# Patient Record
Sex: MALE | Race: WHITE | NOT HISPANIC OR LATINO | Employment: UNEMPLOYED | URBAN - NONMETROPOLITAN AREA
[De-identification: names, ages, dates, MRNs, and addresses within clinical notes are randomized per-mention and may not be internally consistent; named-entity substitution may affect disease eponyms.]

---

## 2018-10-07 ENCOUNTER — APPOINTMENT (OUTPATIENT)
Dept: CT IMAGING | Facility: HOSPITAL | Age: 38
End: 2018-10-07

## 2018-10-07 ENCOUNTER — HOSPITAL ENCOUNTER (INPATIENT)
Facility: HOSPITAL | Age: 38
LOS: 5 days | Discharge: HOME | End: 2018-10-12
Attending: EMERGENCY MEDICINE | Admitting: INTERNAL MEDICINE

## 2018-10-07 DIAGNOSIS — E83.42 HYPOMAGNESEMIA: ICD-10-CM

## 2018-10-07 DIAGNOSIS — F10.10 ALCOHOL ABUSE: ICD-10-CM

## 2018-10-07 DIAGNOSIS — D64.9 ANEMIA, UNSPECIFIED TYPE: Primary | ICD-10-CM

## 2018-10-07 DIAGNOSIS — K92.2 UPPER GI BLEEDING: ICD-10-CM

## 2018-10-07 DIAGNOSIS — E87.6 HYPOKALEMIA: ICD-10-CM

## 2018-10-07 LAB
6-ACETYL MORPHINE: NEGATIVE
ABO GROUP BLD: NORMAL
ABO GROUP BLD: NORMAL
ALBUMIN SERPL-MCNC: 3.4 G/DL (ref 3.5–5)
ALBUMIN/GLOB SERPL: 1 G/DL (ref 1.5–2.5)
ALP SERPL-CCNC: 152 U/L (ref 40–129)
ALT SERPL W P-5'-P-CCNC: 32 U/L (ref 10–44)
AMPHET+METHAMPHET UR QL: NEGATIVE
ANION GAP SERPL CALCULATED.3IONS-SCNC: 12.5 MMOL/L (ref 3.6–11.2)
APAP SERPL-MCNC: <10 MCG/ML (ref 0–200)
APTT PPP: 29.5 SECONDS (ref 23.8–36.1)
AST SERPL-CCNC: 111 U/L (ref 10–34)
BACTERIA UR QL AUTO: NORMAL /HPF
BARBITURATES UR QL SCN: NEGATIVE
BASOPHILS # BLD AUTO: 0.02 10*3/MM3 (ref 0–0.3)
BASOPHILS NFR BLD AUTO: 0.4 % (ref 0–2)
BENZODIAZ UR QL SCN: POSITIVE
BILIRUB SERPL-MCNC: 0.8 MG/DL (ref 0.2–1.8)
BILIRUB UR QL STRIP: ABNORMAL
BLD GP AB SCN SERPL QL: NEGATIVE
BUN BLD-MCNC: 9 MG/DL (ref 7–21)
BUN/CREAT SERPL: 15 (ref 7–25)
BUPRENORPHINE SERPL-MCNC: NEGATIVE NG/ML
CALCIUM SPEC-SCNC: 7.2 MG/DL (ref 7.7–10)
CANNABINOIDS SERPL QL: POSITIVE
CHLORIDE SERPL-SCNC: 97 MMOL/L (ref 99–112)
CLARITY UR: CLEAR
CO2 SERPL-SCNC: 23.5 MMOL/L (ref 24.3–31.9)
COCAINE UR QL: NEGATIVE
COLOR UR: ABNORMAL
CREAT BLD-MCNC: 0.6 MG/DL (ref 0.43–1.29)
DEPRECATED RDW RBC AUTO: 47.1 FL (ref 37–54)
DEVELOPER EXPIRATION DATE: ABNORMAL
DEVELOPER LOT NUMBER: ABNORMAL
EOSINOPHIL # BLD AUTO: 0.02 10*3/MM3 (ref 0–0.7)
EOSINOPHIL NFR BLD AUTO: 0.4 % (ref 0–5)
ERYTHROCYTE [DISTWIDTH] IN BLOOD BY AUTOMATED COUNT: 15.5 % (ref 11.5–14.5)
ETHANOL BLD-MCNC: 195 MG/DL
ETHANOL UR QL: 0.2 %
EXPIRATION DATE: ABNORMAL
FECAL OCCULT BLOOD SCREEN, POC: POSITIVE
GFR SERPL CREATININE-BSD FRML MDRD: 150 ML/MIN/1.73
GLOBULIN UR ELPH-MCNC: 3.4 GM/DL
GLUCOSE BLD-MCNC: 134 MG/DL (ref 70–110)
GLUCOSE UR STRIP-MCNC: NEGATIVE MG/DL
HAV IGM SERPL QL IA: NORMAL
HBV CORE IGM SERPL QL IA: NORMAL
HBV SURFACE AG SERPL QL IA: NORMAL
HCT VFR BLD AUTO: 15.3 % (ref 42–52)
HCV AB SER DONR QL: NORMAL
HGB BLD-MCNC: 4.6 G/DL (ref 14–18)
HGB UR QL STRIP.AUTO: NEGATIVE
HYALINE CASTS UR QL AUTO: NORMAL /LPF
IMM GRANULOCYTES # BLD: 0.01 10*3/MM3 (ref 0–0.03)
IMM GRANULOCYTES NFR BLD: 0.2 % (ref 0–0.5)
INR PPP: 1.3 (ref 0.9–1.1)
KETONES UR QL STRIP: ABNORMAL
LEUKOCYTE ESTERASE UR QL STRIP.AUTO: ABNORMAL
LYMPHOCYTES # BLD AUTO: 1.33 10*3/MM3 (ref 1–3)
LYMPHOCYTES NFR BLD AUTO: 23.5 % (ref 21–51)
Lab: ABNORMAL
MAGNESIUM SERPL-MCNC: 1.2 MG/DL (ref 1.7–2.6)
MCH RBC QN AUTO: 26.3 PG (ref 27–33)
MCHC RBC AUTO-ENTMCNC: 30.1 G/DL (ref 33–37)
MCV RBC AUTO: 87.4 FL (ref 80–94)
METHADONE UR QL SCN: NEGATIVE
MONOCYTES # BLD AUTO: 0.51 10*3/MM3 (ref 0.1–0.9)
MONOCYTES NFR BLD AUTO: 9 % (ref 0–10)
NEGATIVE CONTROL: NEGATIVE
NEUTROPHILS # BLD AUTO: 3.76 10*3/MM3 (ref 1.4–6.5)
NEUTROPHILS NFR BLD AUTO: 66.5 % (ref 30–70)
NITRITE UR QL STRIP: NEGATIVE
OPIATES UR QL: NEGATIVE
OSMOLALITY SERPL CALC.SUM OF ELEC: 267 MOSM/KG (ref 273–305)
OXYCODONE UR QL SCN: NEGATIVE
PCP UR QL SCN: NEGATIVE
PH UR STRIP.AUTO: 6 [PH] (ref 5–8)
PLATELET # BLD AUTO: 210 10*3/MM3 (ref 130–400)
PMV BLD AUTO: 11 FL (ref 6–10)
POSITIVE CONTROL: POSITIVE
POTASSIUM BLD-SCNC: 2.8 MMOL/L (ref 3.5–5.3)
PROT SERPL-MCNC: 6.8 G/DL (ref 6–8)
PROT UR QL STRIP: ABNORMAL
PROTHROMBIN TIME: 16.4 SECONDS (ref 11–15.4)
RBC # BLD AUTO: 1.75 10*6/MM3 (ref 4.7–6.1)
RBC # UR: NORMAL /HPF
REF LAB TEST METHOD: NORMAL
RH BLD: POSITIVE
RH BLD: POSITIVE
SODIUM BLD-SCNC: 133 MMOL/L (ref 135–153)
SODIUM BLD-SCNC: 136 MMOL/L (ref 135–153)
SP GR UR STRIP: 1.02 (ref 1–1.03)
SQUAMOUS #/AREA URNS HPF: NORMAL /HPF
T&S EXPIRATION DATE: NORMAL
UROBILINOGEN UR QL STRIP: ABNORMAL
WBC NRBC COR # BLD: 5.65 10*3/MM3 (ref 4.5–12.5)
WBC UR QL AUTO: NORMAL /HPF

## 2018-10-07 PROCEDURE — 86900 BLOOD TYPING SEROLOGIC ABO: CPT | Performed by: PHYSICIAN ASSISTANT

## 2018-10-07 PROCEDURE — 80307 DRUG TEST PRSMV CHEM ANLYZR: CPT | Performed by: PHYSICIAN ASSISTANT

## 2018-10-07 PROCEDURE — 85730 THROMBOPLASTIN TIME PARTIAL: CPT | Performed by: PHYSICIAN ASSISTANT

## 2018-10-07 PROCEDURE — 86901 BLOOD TYPING SEROLOGIC RH(D): CPT

## 2018-10-07 PROCEDURE — 86920 COMPATIBILITY TEST SPIN: CPT

## 2018-10-07 PROCEDURE — 25010000002 LORAZEPAM PER 2 MG: Performed by: NURSE PRACTITIONER

## 2018-10-07 PROCEDURE — 25010000002 MORPHINE PER 10 MG: Performed by: EMERGENCY MEDICINE

## 2018-10-07 PROCEDURE — 82270 OCCULT BLOOD FECES: CPT | Performed by: EMERGENCY MEDICINE

## 2018-10-07 PROCEDURE — 25010000002 VITAMIN K1 PER 1 MG: Performed by: INTERNAL MEDICINE

## 2018-10-07 PROCEDURE — 86850 RBC ANTIBODY SCREEN: CPT | Performed by: PHYSICIAN ASSISTANT

## 2018-10-07 PROCEDURE — 25010000002 THIAMINE PER 100 MG: Performed by: PHYSICIAN ASSISTANT

## 2018-10-07 PROCEDURE — 81001 URINALYSIS AUTO W/SCOPE: CPT | Performed by: PHYSICIAN ASSISTANT

## 2018-10-07 PROCEDURE — 99223 1ST HOSP IP/OBS HIGH 75: CPT | Performed by: INTERNAL MEDICINE

## 2018-10-07 PROCEDURE — 80307 DRUG TEST PRSMV CHEM ANLYZR: CPT | Performed by: EMERGENCY MEDICINE

## 2018-10-07 PROCEDURE — 93005 ELECTROCARDIOGRAM TRACING: CPT | Performed by: NURSE PRACTITIONER

## 2018-10-07 PROCEDURE — 93010 ELECTROCARDIOGRAM REPORT: CPT | Performed by: INTERNAL MEDICINE

## 2018-10-07 PROCEDURE — 25010000002 ONDANSETRON PER 1 MG: Performed by: EMERGENCY MEDICINE

## 2018-10-07 PROCEDURE — 36430 TRANSFUSION BLD/BLD COMPNT: CPT

## 2018-10-07 PROCEDURE — 74176 CT ABD & PELVIS W/O CONTRAST: CPT

## 2018-10-07 PROCEDURE — 83735 ASSAY OF MAGNESIUM: CPT | Performed by: EMERGENCY MEDICINE

## 2018-10-07 PROCEDURE — 85610 PROTHROMBIN TIME: CPT | Performed by: PHYSICIAN ASSISTANT

## 2018-10-07 PROCEDURE — 25010000002 MAGNESIUM SULFATE PER 500 MG OF MAGNESIUM: Performed by: PHYSICIAN ASSISTANT

## 2018-10-07 PROCEDURE — 25010000002 MORPHINE PER 10 MG: Performed by: INTERNAL MEDICINE

## 2018-10-07 PROCEDURE — 99285 EMERGENCY DEPT VISIT HI MDM: CPT

## 2018-10-07 PROCEDURE — 84295 ASSAY OF SERUM SODIUM: CPT | Performed by: INTERNAL MEDICINE

## 2018-10-07 PROCEDURE — P9016 RBC LEUKOCYTES REDUCED: HCPCS

## 2018-10-07 PROCEDURE — 86901 BLOOD TYPING SEROLOGIC RH(D): CPT | Performed by: PHYSICIAN ASSISTANT

## 2018-10-07 PROCEDURE — 86900 BLOOD TYPING SEROLOGIC ABO: CPT

## 2018-10-07 PROCEDURE — 25010000003 POTASSIUM CHLORIDE 10 MEQ/100ML SOLUTION

## 2018-10-07 PROCEDURE — 80074 ACUTE HEPATITIS PANEL: CPT | Performed by: NURSE PRACTITIONER

## 2018-10-07 PROCEDURE — 74176 CT ABD & PELVIS W/O CONTRAST: CPT | Performed by: RADIOLOGY

## 2018-10-07 PROCEDURE — 80053 COMPREHEN METABOLIC PANEL: CPT | Performed by: EMERGENCY MEDICINE

## 2018-10-07 PROCEDURE — 85025 COMPLETE CBC W/AUTO DIFF WBC: CPT | Performed by: EMERGENCY MEDICINE

## 2018-10-07 PROCEDURE — 25010000002 MAGNESIUM SULFATE 2 GM/50ML SOLUTION: Performed by: PHYSICIAN ASSISTANT

## 2018-10-07 RX ORDER — LORAZEPAM 1 MG/1
1 TABLET ORAL
Status: DISCONTINUED | OUTPATIENT
Start: 2018-10-07 | End: 2018-10-12 | Stop reason: HOSPADM

## 2018-10-07 RX ORDER — POTASSIUM CHLORIDE 1.5 G/1.77G
40 POWDER, FOR SOLUTION ORAL AS NEEDED
Status: DISCONTINUED | OUTPATIENT
Start: 2018-10-07 | End: 2018-10-08 | Stop reason: SDUPTHER

## 2018-10-07 RX ORDER — MORPHINE SULFATE 2 MG/ML
1 INJECTION, SOLUTION INTRAMUSCULAR; INTRAVENOUS
Status: DISCONTINUED | OUTPATIENT
Start: 2018-10-07 | End: 2018-10-09

## 2018-10-07 RX ORDER — POTASSIUM CHLORIDE 750 MG/1
40 CAPSULE, EXTENDED RELEASE ORAL AS NEEDED
Status: DISCONTINUED | OUTPATIENT
Start: 2018-10-07 | End: 2018-10-08 | Stop reason: SDUPTHER

## 2018-10-07 RX ORDER — SODIUM CHLORIDE 0.9 % (FLUSH) 0.9 %
3-10 SYRINGE (ML) INJECTION AS NEEDED
Status: DISCONTINUED | OUTPATIENT
Start: 2018-10-07 | End: 2018-10-12 | Stop reason: HOSPADM

## 2018-10-07 RX ORDER — PHYTONADIONE 10 MG/ML
5 INJECTION, EMULSION INTRAMUSCULAR; INTRAVENOUS; SUBCUTANEOUS ONCE
Status: COMPLETED | OUTPATIENT
Start: 2018-10-07 | End: 2018-10-07

## 2018-10-07 RX ORDER — LORAZEPAM 2 MG/ML
2 INJECTION INTRAMUSCULAR
Status: DISCONTINUED | OUTPATIENT
Start: 2018-10-07 | End: 2018-10-12 | Stop reason: HOSPADM

## 2018-10-07 RX ORDER — MAGNESIUM SULFATE HEPTAHYDRATE 40 MG/ML
2 INJECTION, SOLUTION INTRAVENOUS AS NEEDED
Status: DISCONTINUED | OUTPATIENT
Start: 2018-10-07 | End: 2018-10-08 | Stop reason: SDUPTHER

## 2018-10-07 RX ORDER — SODIUM CHLORIDE 0.9 % (FLUSH) 0.9 %
3 SYRINGE (ML) INJECTION EVERY 12 HOURS SCHEDULED
Status: DISCONTINUED | OUTPATIENT
Start: 2018-10-07 | End: 2018-10-12 | Stop reason: HOSPADM

## 2018-10-07 RX ORDER — PANTOPRAZOLE SODIUM 40 MG/10ML
80 INJECTION, POWDER, LYOPHILIZED, FOR SOLUTION INTRAVENOUS ONCE
Status: COMPLETED | OUTPATIENT
Start: 2018-10-07 | End: 2018-10-07

## 2018-10-07 RX ORDER — LORAZEPAM 2 MG/ML
1 INJECTION INTRAMUSCULAR
Status: DISCONTINUED | OUTPATIENT
Start: 2018-10-07 | End: 2018-10-12 | Stop reason: HOSPADM

## 2018-10-07 RX ORDER — POTASSIUM CHLORIDE 7.45 MG/ML
10 INJECTION INTRAVENOUS
Status: DISCONTINUED | OUTPATIENT
Start: 2018-10-07 | End: 2018-10-08 | Stop reason: SDUPTHER

## 2018-10-07 RX ORDER — MORPHINE SULFATE 2 MG/ML
4 INJECTION, SOLUTION INTRAMUSCULAR; INTRAVENOUS ONCE
Status: COMPLETED | OUTPATIENT
Start: 2018-10-07 | End: 2018-10-07

## 2018-10-07 RX ORDER — MAGNESIUM SULFATE HEPTAHYDRATE 40 MG/ML
2 INJECTION, SOLUTION INTRAVENOUS
Status: COMPLETED | OUTPATIENT
Start: 2018-10-07 | End: 2018-10-07

## 2018-10-07 RX ORDER — ONDANSETRON 2 MG/ML
4 INJECTION INTRAMUSCULAR; INTRAVENOUS ONCE
Status: COMPLETED | OUTPATIENT
Start: 2018-10-07 | End: 2018-10-07

## 2018-10-07 RX ORDER — POTASSIUM CHLORIDE 7.45 MG/ML
10 INJECTION INTRAVENOUS
Status: COMPLETED | OUTPATIENT
Start: 2018-10-07 | End: 2018-10-07

## 2018-10-07 RX ORDER — LORAZEPAM 2 MG/1
2 TABLET ORAL
Status: DISCONTINUED | OUTPATIENT
Start: 2018-10-07 | End: 2018-10-12 | Stop reason: HOSPADM

## 2018-10-07 RX ORDER — SODIUM CHLORIDE 9 MG/ML
INJECTION, SOLUTION INTRAVENOUS
Status: DISPENSED
Start: 2018-10-07 | End: 2018-10-08

## 2018-10-07 RX ORDER — MAGNESIUM SULFATE HEPTAHYDRATE 40 MG/ML
4 INJECTION, SOLUTION INTRAVENOUS AS NEEDED
Status: DISCONTINUED | OUTPATIENT
Start: 2018-10-07 | End: 2018-10-08 | Stop reason: SDUPTHER

## 2018-10-07 RX ADMIN — MAGNESIUM SULFATE IN WATER 2 G: 40 INJECTION, SOLUTION INTRAVENOUS at 15:32

## 2018-10-07 RX ADMIN — PANTOPRAZOLE SODIUM 80 MG: 40 INJECTION, POWDER, FOR SOLUTION INTRAVENOUS at 15:18

## 2018-10-07 RX ADMIN — POTASSIUM CHLORIDE 10 MEQ: 10 INJECTION, SOLUTION INTRAVENOUS at 18:58

## 2018-10-07 RX ADMIN — Medication 3 ML: at 20:02

## 2018-10-07 RX ADMIN — POTASSIUM CHLORIDE 10 MEQ: 10 INJECTION, SOLUTION INTRAVENOUS at 21:21

## 2018-10-07 RX ADMIN — LORAZEPAM 1 MG: 2 INJECTION INTRAMUSCULAR; INTRAVENOUS at 17:32

## 2018-10-07 RX ADMIN — POTASSIUM CHLORIDE 10 MEQ: 10 INJECTION, SOLUTION INTRAVENOUS at 20:01

## 2018-10-07 RX ADMIN — MAGNESIUM SULFATE IN WATER 2 G: 40 INJECTION, SOLUTION INTRAVENOUS at 17:19

## 2018-10-07 RX ADMIN — PANTOPRAZOLE SODIUM 8 MG/HR: 40 INJECTION, POWDER, FOR SOLUTION INTRAVENOUS at 15:20

## 2018-10-07 RX ADMIN — ONDANSETRON 4 MG: 2 INJECTION, SOLUTION INTRAMUSCULAR; INTRAVENOUS at 15:49

## 2018-10-07 RX ADMIN — MORPHINE SULFATE 1 MG: 2 INJECTION, SOLUTION INTRAMUSCULAR; INTRAVENOUS at 20:36

## 2018-10-07 RX ADMIN — PANTOPRAZOLE SODIUM 8 MG/HR: 40 INJECTION, POWDER, FOR SOLUTION INTRAVENOUS at 22:48

## 2018-10-07 RX ADMIN — MORPHINE SULFATE 1 MG: 2 INJECTION, SOLUTION INTRAMUSCULAR; INTRAVENOUS at 17:32

## 2018-10-07 RX ADMIN — POTASSIUM CHLORIDE 10 MEQ: 10 INJECTION, SOLUTION INTRAVENOUS at 15:16

## 2018-10-07 RX ADMIN — THIAMINE HYDROCHLORIDE 1000 ML/HR: 100 INJECTION, SOLUTION INTRAMUSCULAR; INTRAVENOUS at 15:10

## 2018-10-07 RX ADMIN — PHYTONADIONE 5 MG: 10 INJECTION, EMULSION INTRAMUSCULAR; INTRAVENOUS; SUBCUTANEOUS at 18:27

## 2018-10-07 RX ADMIN — POTASSIUM CHLORIDE 10 MEQ: 10 INJECTION, SOLUTION INTRAVENOUS at 17:18

## 2018-10-07 RX ADMIN — MORPHINE SULFATE 1 MG: 2 INJECTION, SOLUTION INTRAMUSCULAR; INTRAVENOUS at 23:44

## 2018-10-07 RX ADMIN — POLYETHYLENE GLYCOL 3350 255 G: 1 POWDER ORAL at 19:34

## 2018-10-07 RX ADMIN — MORPHINE SULFATE 4 MG: 2 INJECTION, SOLUTION INTRAMUSCULAR; INTRAVENOUS at 15:50

## 2018-10-07 RX ADMIN — POTASSIUM CHLORIDE 10 MEQ: 10 INJECTION, SOLUTION INTRAVENOUS at 18:26

## 2018-10-07 NOTE — ED PROVIDER NOTES
Subjective   38-year-old white male presents secondary for need for alcohol detox.  He states that he has been drinking approximately one pack per day over the past 4-5 months.  He denies any abdominal pain.  He states that over the last week or so he has had some dark tarry stools.  He has had a few episodes of vomiting.  He states that a couple times he's noticed some dark specks.  No bright red blood.  He voices no other complaints at this time.            Review of Systems   Constitutional: Negative.  Negative for fever.   HENT: Negative.    Respiratory: Negative.    Cardiovascular: Negative.  Negative for chest pain.   Gastrointestinal: Negative.  Negative for abdominal pain.   Endocrine: Negative.    Genitourinary: Negative.  Negative for dysuria.   Skin: Negative.    Neurological: Negative.    Psychiatric/Behavioral: Negative.    All other systems reviewed and are negative.      Past Medical History:   Diagnosis Date   • Alcohol abuse    • Seizure due to alcohol withdrawal (CMS/HCC)    • Seizures (CMS/HCC)        No Known Allergies    History reviewed. No pertinent surgical history.    History reviewed. No pertinent family history.    Social History     Social History   • Marital status: Single     Social History Main Topics   • Smoking status: Never Smoker   • Smokeless tobacco: Current User     Types: Snuff   • Alcohol use Yes      Comment: 1 pint liquor daily   • Drug use: No     Other Topics Concern   • Not on file           Objective   Physical Exam   Constitutional: He is oriented to person, place, and time. He appears well-developed and well-nourished. No distress.   pale   HENT:   Head: Normocephalic and atraumatic.   Right Ear: External ear normal.   Left Ear: External ear normal.   Nose: Nose normal.   Eyes: Pupils are equal, round, and reactive to light. Conjunctivae and EOM are normal.   Neck: Normal range of motion. Neck supple. No JVD present. No tracheal deviation present.   Cardiovascular:  Regular rhythm and normal heart sounds.  Tachycardia present.    No murmur heard.  Pulmonary/Chest: Effort normal and breath sounds normal. No respiratory distress. He has no wheezes.   Abdominal: Soft. Bowel sounds are normal. There is no tenderness.   Genitourinary: Rectal exam shows guaiac positive stool.   Genitourinary Comments: Stool was brown.  No bright red blood.  No melena.  This was heme positive.   Musculoskeletal: Normal range of motion. He exhibits no edema or deformity.   Neurological: He is alert and oriented to person, place, and time. No cranial nerve deficit.   Skin: Skin is warm and dry. No rash noted. He is not diaphoretic. No erythema. No pallor.   Psychiatric: He has a normal mood and affect. His behavior is normal. Thought content normal.   Nursing note and vitals reviewed.      Procedures           ED Course  ED Course as of Oct 07 1557   Sun Oct 07, 2018   1522 D/w Dr Owen- would be happy to see patient in consult. Paged the hospitalist  [JI]      ED Course User Index  [JI] Earnest Balderas PA                  MDM  Number of Diagnoses or Management Options  Alcohol abuse: new and requires workup  Anemia, unspecified type: new and requires workup  Hypokalemia: new and requires workup  Hypomagnesemia: new and requires workup  Upper GI bleeding: new and requires workup     Amount and/or Complexity of Data Reviewed  Clinical lab tests: reviewed and ordered  Tests in the radiology section of CPT®: ordered and reviewed  Discuss the patient with other providers: yes    Risk of Complications, Morbidity, and/or Mortality  Presenting problems: high  Management options: high    Critical Care  Total time providing critical care: 30-74 minutes (40)        Final diagnoses:   Anemia, unspecified type   Upper GI bleeding   Hypokalemia   Hypomagnesemia   Alcohol abuse            Earnest Balderas PA  10/07/18 2161

## 2018-10-07 NOTE — H&P
"  [facility] Medicine Services  History & Physical          Patient Identification:  Name:  Tavo Murrieta  Age:  38 y.o.  Sex:  male  :  1980  MRN:  [id number]   Visit Number:  [id number]  Primary Care Physician:  Duc Pena MD    I have seen the patient in conjunction with JONATHAN Tello and I agree with the following statements:     Subjective     Chief complaint: ETOH detox    Patient seen with sister as well as Lovely SMALLWOOD, I had permission from the patient to discuss his case with him in front of his sister.    History of presenting illness:    Mr. Murrieta is a 38 year old male who presented to [facility] ER on 10/7/18 for ETOH detox. During his medical clearance he was found to have a hemoglobin of 4.6 HCT of 15.3, with a positive hemoccult. He also admitted to dark tarry stools having around 2 episodes a day for around a week he states. He also admits to vomiting, which he states does look like coffee ground emesis, he states this has been on/off for a \"few weeks\". He states he started drinking heavily again around the last \"month or two\" after loosing his job, he became depressed and started drinking more heavily. He has had a seizure about 3 years ago with withdrawal from ETOH. He denies fever, no abdominal pain. We will admit him to CCU for further monitoring and evaluation.  He tells me he drinks about a pint a day.  He has had withdrawal seizures in the past.  His main complaint is leg and arm pain, the leg pain is just like an ache right greater than left the arm pain is more of a sharp pain and this is the arm that has had potassium going through it.  He is uncertain exactly when the pain started.  He has had no associated fever.  This is a constant pain but intensity varies.  Patient states he does not use NSAIDs.  He thinks he probably started drinking in his 20s.  He denies any benzodiazepine use but did acknowledge a THC use.  She states he has been " "getting lightheaded when he gets up after he gets a \"head rush\".  This has been going on for a few days and it will then turned into him feeling like he is going to pass out.    His Ethanol level in the ER is  195, his UDS was positive for benzo's and THC, tylenol level was normal. He also has a potassium of 2.8, magnesium level of 1.2, and sodium level of 133, creatinine is normal at 0.60. WBC is 5.65.  He is not  but lives with a fiancé his next of kin decision maker is his mother, Katrin Murrieta [phone].       ---------------------------------------------------------------------------------------------------------------------   Review of Systems   Constitutional: Positive for fatigue. Negative for chills, diaphoresis and fever.   HENT: Negative for congestion and rhinorrhea.    Eyes: Negative for photophobia and visual disturbance.   Respiratory: Negative for cough, shortness of breath and wheezing.    Cardiovascular: Negative for chest pain and leg swelling.   Gastrointestinal: Positive for blood in stool, diarrhea (dark tarry stools), nausea and vomiting (coffee ground emesis ). Negative for abdominal distention, abdominal pain and constipation.   Endocrine: Negative for cold intolerance and heat intolerance.   Genitourinary: Negative for difficulty urinating.   Musculoskeletal: Negative for arthralgias and myalgias.   Skin: Negative for color change and pallor.   Allergic/Immunologic: Negative for environmental allergies.   Neurological: Negative for dizziness, weakness and light-headedness.   Hematological: Negative for adenopathy.   Psychiatric/Behavioral: Negative for agitation and confusion.      ---------------------------------------------------------------------------------------------------------------------   Past Medical History:   Diagnosis Date   • Alcohol abuse    • Seizure due to alcohol withdrawal (CMS/HCC)    • Seizures (CMS/HCC)      History reviewed. No pertinent surgical " history.  History reviewed. No pertinent family history.  Social History     Social History   • Marital status: Single     Social History Main Topics   • Smoking status: Never Smoker   • Smokeless tobacco: Current User     Types: Snuff   • Alcohol use Yes      Comment: 1 pint liquor daily   • Drug use: No     Other Topics Concern   • Not on file     ---------------------------------------------------------------------------------------------------------------------   Allergies:  Patient has no known allergies.  ---------------------------------------------------------------------------------------------------------------------     Medications below are reported home medications pulling from within the system; at this time, these medications have not been reconciled unless otherwise specified and are in the verification process for further verifcation as current home medications.    Prior to Admission Medications     None        Hospital Scheduled Meds:    sodium chloride      magnesium sulfate 2 g Intravenous Q2H   Morphine 4 mg Intravenous Once   potassium chloride 10 mEq Intravenous Q1H       pantoprazole 8 mg/hr Last Rate: 8 mg/hr (10/07/18 1520)     ---------------------------------------------------------------------------------------------------------------------   Objective       Vital Signs:  Temp:  [98.1 °F (36.7 °C)] 98.1 °F (36.7 °C)  Heart Rate:  [117-135] 117  Resp:  [20] 20  BP: (112-119)/(68-70) 115/69  1    10/07/18  1334   Weight: 77.1 kg (170 lb)     Body mass index is 22.43 kg/m².  ---------------------------------------------------------------------------------------------------------------------       Physical Exam    Physical Exam:  Constitutional:  Well-developed and well-nourished.  Face is symmetric  HENT:  Head: Normocephalic and atraumatic.  Mouth:  Moist mucous membranes.    Eyes:  Conjunctivae and EOM are normal.  Pupils are equal, round, and reactive to light.  No scleral icterus.   Mucus membranes of the eyes are pale  Neck:  Neck supple.  No JVD present.    Cardiovascular:  Normal rate, regular rhythm.  with no murmur.  Trace edema of the left ankle none of the right  Pulmonary/Chest:  No respiratory distress, no wheezes, no crackles, with normal breath sounds and good air movement.  No increased respiratory effort he is not using accessory muscles and can speak in full sentences without difficulty  Abdominal:  Soft.  Bowel sounds are present.  No distension and no tenderness.  No organomegaly appreciated   Musculoskeletal:  No edema, (trace left ankle edema to my exam) no tenderness, and no deformity.  No red or swollen joints anywhere.  IV sites look good he has excellent palpable distal pulses right were slightly greater than left   Neurological:  Alert and oriented to person, place, and time.  No cranial nerve deficit.  No tongue deviation.  No facial droop.  No slurred speech.   Skin:  Skin is warm and dry.  No rash noted.  Pale.   Psychiatric:  Normal mood and affect.   Peripheral vascular:  No edema and strong pulses on all 4 extremities.  ---------------------------------------------------------------------------------------------------------------------  EKG:  EKG reviewed, QTC is acceptable, sinus tach without evidence of ischemia  Telemetry:    I have personally looked at the entry strips and he is in sinus rhythm  ---------------------------------------------------------------------------------------------------------------------     Results from last 7 days  Lab Units 10/07/18  1440   WBC 10*3/mm3 5.65   HEMOGLOBIN g/dL 4.6*   HEMATOCRIT % 15.3*   MCV fL 87.4   MCHC g/dL 30.1*   PLATELETS 10*3/mm3 210           Results from last 7 days  Lab Units 10/07/18  1354   SODIUM mmol/L 133*   POTASSIUM mmol/L 2.8*   MAGNESIUM mg/dL 1.2*   CHLORIDE mmol/L 97*   CO2 mmol/L 23.5*   BUN mg/dL 9   CREATININE mg/dL 0.60   EGFR IF NONAFRICN AM mL/min/1.73 150   CALCIUM mg/dL 7.2*   GLUCOSE mg/dL  134*   ALBUMIN g/dL 3.40*   BILIRUBIN mg/dL 0.8   ALK PHOS U/L 152*   AST (SGOT) U/L 111*   ALT (SGPT) U/L 32   Estimated Creatinine Clearance: 180.3 mL/min (by C-G formula based on SCr of 0.6 mg/dL).  No results found for: AMMONIA          No results found for: HGBA1C  No results found for: TSH, FREET4  No results found for: PREGTESTUR, PREGSERUM, HCG, HCGQUANT  Pain Management Panel     Pain Management Panel Latest Ref Rng & Units 10/7/2018    AMPHETAMINES SCREEN, URINE Negative Negative    BARBITURATES SCREEN Negative Negative    BENZODIAZEPINE SCREEN, URINE Negative Positive(A)    BUPRENORPHINE Negative Negative    COCAINE SCREEN, URINE Negative Negative    METHADONE SCREEN, URINE Negative Negative                   ---------------------------------------------------------------------------------------------------------------------  Imaging Results (last 7 days)     ** No results found for the last 168 hours. **          Cultures: none     I have personally reviewed the radiology images and read the final radiology report.  ---------------------------------------------------------------------------------------------------------------------  Assessment / Plan       Assessment and Plan:    ETOH withdrawal  Acute GI Bleed  Acute anemia r/t blood loss  Hypokalemia  Hypomagnesemia  Hyponatremia   Elevated Transaminase     ETOH withdrawal: CIWA protocol ordered, banana bag daily for 3 days, seizure precautions. Will monitor closely.  Possibly tomorrow once his alcohol has been metabolized consider instigation of scheduled Librium    Acute GI bleed: will trend H/H every 8 hours, the ED has ordered 2 units of PRBC for now. Repeat H/H after. protonix 40mg gtt ordered. General surgery consulted. Will monitor closely. STAT CT of the abdomen/pelvis ordered without contrast.  The CT is to look for any evidence of a nodular small cirrhotic liver.  INR is 1.3 but most likely he is vitamin deficient, I'm going to give a dose of  vitamin K.  I did discuss with Dr. Owen, I have placed the patient on clear liquids and nothing by mouth after midnight and he is going to do an EGD and colonoscopy tomorrow, he is going to call in a prep.  He is tachycardic and was getting orthostasis type symptoms but his blood pressure is acceptable at this time and I do not think he is actively bleeding.  I discussed with the ER provider he did a rectal exam that showed brown stool that was heme positive.  Will check anemia studies in the a.m. but these may be somewhat affected by having had a transfusion today.    Hypokalemia: level is 2.8, protocol replacement ordered.     Arm pain probably from potassium    Leg pain, neurovascularly intact, low-dose morphine added    Hypomagnesemia: 1.2, protocol replacement ordered.     Hyponatremia: level is 133, borderline low, will monitor closely with IV fluids will check sodium every 6 hours, with parameters to call    Elevated Transaminase: ALT is 111, acute hep panel ordered, likely related to his ETOH use. Acute hep panel ordered. INR is 1.30, vitamin K to be given, repeat in the AM>Will monitor trend.     DVT prophylaxis: no pharmacological prophylaxis due to anemia, SCDs ordered.     Patient is high risk for the following reasons: ETOH withdrawal, Acute GI bleed, Acute anemia r/t blood loss       Ratna Joyner, JONATHAN  10/07/18  3:53 PM  ---------------------------------------------------------------------------------------------------------------------

## 2018-10-07 NOTE — PLAN OF CARE
Problem: Alcohol Withdrawal Acute, Risk/Actual (Adult)  Goal: Signs and Symptoms of Listed Potential Problems Will be Absent, Minimized or Managed (Alcohol Withdrawal Acute, Risk/Actual)  Outcome: Ongoing (interventions implemented as appropriate)      Problem: Patient Care Overview  Goal: Plan of Care Review  Outcome: Ongoing (interventions implemented as appropriate)    Goal: Individualization and Mutuality  Outcome: Ongoing (interventions implemented as appropriate)    Goal: Discharge Needs Assessment  Outcome: Ongoing (interventions implemented as appropriate)      Problem: Anemia (Adult)  Goal: Identify Related Risk Factors and Signs and Symptoms  Outcome: Ongoing (interventions implemented as appropriate)    Goal: Symptom Improvement  Outcome: Ongoing (interventions implemented as appropriate)

## 2018-10-07 NOTE — PROGRESS NOTES
THC Physician - Brief Progress Note  PERMANENT  10/07/2018 18:41    [facility]  [facility] - CCU - 10 - [place], [place] ([facility])    JEFF PORTILLO    Date of Service 10/07/2018 18:41    HPI/Events of Note AICU Provider Assessment Note    Jeff Portillo is a 37yo M admitted to [facility] ICU for ETOH WD syndrome, along with anemia of blood loss that has likely been going on for more than a week and thus compensated, most likely Gastric in origin.  He was originally going to be admitted to the   [facility] but his Hb on routine labs was noted to be 5 and he was thus transferred to ICU.  Protecting airway, not actively bleeding or vomitting.      Was given PRBC 2 units and Vit K  PPI drip was started an GSurg consulted for Endoscopy  BPs have been normotensive 100/70  -110s  H/H being monitored and more blood will be given to goal of Hb >7-8  He is also requiring PRN Ativan for tremors which are now under control  Poor insight into situation, will likely need a lot of CBT after we get the medical issues sorted out  PIV access x 2, no CVC, has not required pressors  Doesn't appear infected    INR 1.3  K+ 2.8  Mag 1.2    Lytes are being repleted with IV replacement currently, he runs the risk of a fatal arrhythmia if not corrected and monitored    Plan is to keep him NPO, T&C, monitor hemodynamics and scope in am, PPI drip  We will continue to monitor him overnight and assist with any critical care issues that arise.    Spoke to bedside RN who did not need any new orders    __y___   Video Assessment performed  __y___   Most recent labs reviewed  __y___   Vital Signs reviewed  __y___   Best Practices addressed:                 VTE prophylaxis:                 SUP (when indicated):                 Glycemic control:                      Please notify bedside physician when present or [facility] if glc > 180 X 2                 Sepsis guidelines:                 Lung protective strategy                  Targeted Temperature Management:    _y____     Spoke with bedside RN  _n____     Orders written      Contact [facility] for any needs if bedside physician is not present.      Interventions Major-Change in mental status - evaluation and management, Electrolyte abnormality - evaluation and management, Hemorrhage - evaluation and management  Intermediate-Best-practice therapies (e.g. VTE, beta blocker, etc.), Bleeding - evaluation and treatment with blood products, Communication with other healthcare providers and/or family, Diagnostic test evaluation        Electronically Signed by: Ramon Pompa) on 10/07/2018 18:50

## 2018-10-07 NOTE — CONSULTS
Patient interviewed, examined, and chart reviewed.  Discussed with Dr. Richardson.    We'll consult follow    Will do EGD and colonoscopy tomorrow.  Colonoscopy prep has been ordered

## 2018-10-08 ENCOUNTER — ANESTHESIA (OUTPATIENT)
Dept: PERIOP | Facility: HOSPITAL | Age: 38
End: 2018-10-08

## 2018-10-08 ENCOUNTER — ANESTHESIA EVENT (OUTPATIENT)
Dept: PERIOP | Facility: HOSPITAL | Age: 38
End: 2018-10-08

## 2018-10-08 LAB
ABO + RH BLD: NORMAL
ABO + RH BLD: NORMAL
ALBUMIN SERPL-MCNC: 2.9 G/DL (ref 3.5–5)
ALBUMIN/GLOB SERPL: 1 G/DL (ref 1.5–2.5)
ALP SERPL-CCNC: 127 U/L (ref 40–129)
ALT SERPL W P-5'-P-CCNC: 25 U/L (ref 10–44)
ANION GAP SERPL CALCULATED.3IONS-SCNC: 5.2 MMOL/L (ref 3.6–11.2)
AST SERPL-CCNC: 81 U/L (ref 10–34)
BASOPHILS # BLD AUTO: 0.01 10*3/MM3 (ref 0–0.3)
BASOPHILS NFR BLD AUTO: 0.2 % (ref 0–2)
BH BB BLOOD EXPIRATION DATE: NORMAL
BH BB BLOOD EXPIRATION DATE: NORMAL
BH BB BLOOD TYPE BARCODE: 6200
BH BB BLOOD TYPE BARCODE: 6200
BH BB DISPENSE STATUS: NORMAL
BH BB DISPENSE STATUS: NORMAL
BH BB PRODUCT CODE: NORMAL
BH BB PRODUCT CODE: NORMAL
BH BB UNIT NUMBER: NORMAL
BH BB UNIT NUMBER: NORMAL
BILIRUB SERPL-MCNC: 1.2 MG/DL (ref 0.2–1.8)
BUN BLD-MCNC: 8 MG/DL (ref 7–21)
BUN/CREAT SERPL: 16.7 (ref 7–25)
CALCIUM SPEC-SCNC: 6.5 MG/DL (ref 7.7–10)
CHLORIDE SERPL-SCNC: 103 MMOL/L (ref 99–112)
CO2 SERPL-SCNC: 26.8 MMOL/L (ref 24.3–31.9)
CREAT BLD-MCNC: 0.48 MG/DL (ref 0.43–1.29)
CROSSMATCH INTERPRETATION: NORMAL
CROSSMATCH INTERPRETATION: NORMAL
DEPRECATED RDW RBC AUTO: 46.8 FL (ref 37–54)
EOSINOPHIL # BLD AUTO: 0.02 10*3/MM3 (ref 0–0.7)
EOSINOPHIL NFR BLD AUTO: 0.5 % (ref 0–5)
ERYTHROCYTE [DISTWIDTH] IN BLOOD BY AUTOMATED COUNT: 14.8 % (ref 11.5–14.5)
FERRITIN SERPL-MCNC: 20 NG/ML (ref 21.9–321.7)
FOLATE SERPL-MCNC: 5.6 NG/ML (ref 5.4–20)
GFR SERPL CREATININE-BSD FRML MDRD: >150 ML/MIN/1.73
GLOBULIN UR ELPH-MCNC: 2.9 GM/DL
GLUCOSE BLD-MCNC: 103 MG/DL (ref 70–110)
HCT VFR BLD AUTO: 20 % (ref 42–52)
HCT VFR BLD AUTO: 21.7 % (ref 42–52)
HCT VFR BLD AUTO: 21.8 % (ref 42–52)
HCT VFR BLD AUTO: 22.3 % (ref 42–52)
HCT VFR BLD AUTO: 22.6 % (ref 42–52)
HGB BLD-MCNC: 6.1 G/DL (ref 14–18)
HGB BLD-MCNC: 6.7 G/DL (ref 14–18)
HGB BLD-MCNC: 6.7 G/DL (ref 14–18)
HGB BLD-MCNC: 6.9 G/DL (ref 14–18)
HGB BLD-MCNC: 7.1 G/DL (ref 14–18)
IMM GRANULOCYTES # BLD: 0.01 10*3/MM3 (ref 0–0.03)
IMM GRANULOCYTES NFR BLD: 0.2 % (ref 0–0.5)
INR PPP: 1.29 (ref 0.9–1.1)
IRON 24H UR-MRATE: 17 MCG/DL (ref 53–167)
IRON SATN MFR SERPL: 5 % (ref 20–50)
LYMPHOCYTES # BLD AUTO: 1.01 10*3/MM3 (ref 1–3)
LYMPHOCYTES NFR BLD AUTO: 24.4 % (ref 21–51)
MAGNESIUM SERPL-MCNC: 1.8 MG/DL (ref 1.7–2.6)
MCH RBC QN AUTO: 27.4 PG (ref 27–33)
MCHC RBC AUTO-ENTMCNC: 30.5 G/DL (ref 33–37)
MCV RBC AUTO: 89.7 FL (ref 80–94)
MONOCYTES # BLD AUTO: 0.35 10*3/MM3 (ref 0.1–0.9)
MONOCYTES NFR BLD AUTO: 8.5 % (ref 0–10)
NEUTROPHILS # BLD AUTO: 2.74 10*3/MM3 (ref 1.4–6.5)
NEUTROPHILS NFR BLD AUTO: 66.2 % (ref 30–70)
OSMOLALITY SERPL CALC.SUM OF ELEC: 268.7 MOSM/KG (ref 273–305)
PHOSPHATE SERPL-MCNC: 2.4 MG/DL (ref 2.7–4.5)
PLATELET # BLD AUTO: 169 10*3/MM3 (ref 130–400)
PMV BLD AUTO: 10.3 FL (ref 6–10)
POTASSIUM BLD-SCNC: 3.4 MMOL/L (ref 3.5–5.3)
PROT SERPL-MCNC: 5.8 G/DL (ref 6–8)
PROTHROMBIN TIME: 16.3 SECONDS (ref 11–15.4)
RBC # BLD AUTO: 2.23 10*6/MM3 (ref 4.7–6.1)
RETICS #: 0.1 10*6/MM3 (ref 0.02–0.13)
RETICS/RBC NFR AUTO: 4.63 % (ref 0.5–2)
SODIUM BLD-SCNC: 133 MMOL/L (ref 135–153)
SODIUM BLD-SCNC: 133 MMOL/L (ref 135–153)
SODIUM BLD-SCNC: 135 MMOL/L (ref 135–153)
SODIUM BLD-SCNC: 135 MMOL/L (ref 135–153)
TIBC SERPL-MCNC: 358 MCG/DL (ref 241–421)
UNIT  ABO: NORMAL
UNIT  ABO: NORMAL
UNIT  RH: NORMAL
UNIT  RH: NORMAL
VIT B12 BLD-MCNC: 553 PG/ML (ref 211–911)
WBC NRBC COR # BLD: 4.14 10*3/MM3 (ref 4.5–12.5)

## 2018-10-08 PROCEDURE — 30243N1 TRANSFUSION OF NONAUTOLOGOUS RED BLOOD CELLS INTO CENTRAL VEIN, PERCUTANEOUS APPROACH: ICD-10-PCS | Performed by: SURGERY

## 2018-10-08 PROCEDURE — 25010000002 LORAZEPAM PER 2 MG: Performed by: NURSE PRACTITIONER

## 2018-10-08 PROCEDURE — 85018 HEMOGLOBIN: CPT | Performed by: NURSE PRACTITIONER

## 2018-10-08 PROCEDURE — 86900 BLOOD TYPING SEROLOGIC ABO: CPT

## 2018-10-08 PROCEDURE — 45380 COLONOSCOPY AND BIOPSY: CPT | Performed by: SURGERY

## 2018-10-08 PROCEDURE — 25010000002 PROPOFOL 10 MG/ML EMULSION: Performed by: NURSE ANESTHETIST, CERTIFIED REGISTERED

## 2018-10-08 PROCEDURE — 84295 ASSAY OF SERUM SODIUM: CPT | Performed by: INTERNAL MEDICINE

## 2018-10-08 PROCEDURE — 83735 ASSAY OF MAGNESIUM: CPT | Performed by: NURSE PRACTITIONER

## 2018-10-08 PROCEDURE — 25010000002 THIAMINE PER 100 MG: Performed by: NURSE PRACTITIONER

## 2018-10-08 PROCEDURE — 25010000002 FENTANYL CITRATE (PF) 100 MCG/2ML SOLUTION: Performed by: NURSE ANESTHETIST, CERTIFIED REGISTERED

## 2018-10-08 PROCEDURE — 85045 AUTOMATED RETICULOCYTE COUNT: CPT | Performed by: INTERNAL MEDICINE

## 2018-10-08 PROCEDURE — 0DBE8ZX EXCISION OF LARGE INTESTINE, VIA NATURAL OR ARTIFICIAL OPENING ENDOSCOPIC, DIAGNOSTIC: ICD-10-PCS | Performed by: SURGERY

## 2018-10-08 PROCEDURE — 83550 IRON BINDING TEST: CPT | Performed by: INTERNAL MEDICINE

## 2018-10-08 PROCEDURE — 82728 ASSAY OF FERRITIN: CPT | Performed by: INTERNAL MEDICINE

## 2018-10-08 PROCEDURE — 84100 ASSAY OF PHOSPHORUS: CPT | Performed by: NURSE PRACTITIONER

## 2018-10-08 PROCEDURE — 85610 PROTHROMBIN TIME: CPT | Performed by: NURSE PRACTITIONER

## 2018-10-08 PROCEDURE — 85025 COMPLETE CBC W/AUTO DIFF WBC: CPT | Performed by: NURSE PRACTITIONER

## 2018-10-08 PROCEDURE — P9016 RBC LEUKOCYTES REDUCED: HCPCS

## 2018-10-08 PROCEDURE — 25010000002 MIDAZOLAM PER 1 MG: Performed by: NURSE ANESTHETIST, CERTIFIED REGISTERED

## 2018-10-08 PROCEDURE — 25010000002 PROPOFOL 1000 MG/ML EMULSION: Performed by: NURSE ANESTHETIST, CERTIFIED REGISTERED

## 2018-10-08 PROCEDURE — 88305 TISSUE EXAM BY PATHOLOGIST: CPT | Performed by: SURGERY

## 2018-10-08 PROCEDURE — 85014 HEMATOCRIT: CPT | Performed by: INTERNAL MEDICINE

## 2018-10-08 PROCEDURE — 85014 HEMATOCRIT: CPT | Performed by: NURSE PRACTITIONER

## 2018-10-08 PROCEDURE — 82746 ASSAY OF FOLIC ACID SERUM: CPT | Performed by: INTERNAL MEDICINE

## 2018-10-08 PROCEDURE — 25010000003 POTASSIUM CHLORIDE 10 MEQ/100ML SOLUTION: Performed by: INTERNAL MEDICINE

## 2018-10-08 PROCEDURE — 43239 EGD BIOPSY SINGLE/MULTIPLE: CPT | Performed by: SURGERY

## 2018-10-08 PROCEDURE — 83540 ASSAY OF IRON: CPT | Performed by: INTERNAL MEDICINE

## 2018-10-08 PROCEDURE — 99233 SBSQ HOSP IP/OBS HIGH 50: CPT | Performed by: INTERNAL MEDICINE

## 2018-10-08 PROCEDURE — 85018 HEMOGLOBIN: CPT | Performed by: INTERNAL MEDICINE

## 2018-10-08 PROCEDURE — 36430 TRANSFUSION BLD/BLD COMPNT: CPT

## 2018-10-08 PROCEDURE — 25010000002 MORPHINE PER 10 MG: Performed by: INTERNAL MEDICINE

## 2018-10-08 PROCEDURE — 0DB68ZX EXCISION OF STOMACH, VIA NATURAL OR ARTIFICIAL OPENING ENDOSCOPIC, DIAGNOSTIC: ICD-10-PCS | Performed by: SURGERY

## 2018-10-08 PROCEDURE — 94799 UNLISTED PULMONARY SVC/PX: CPT

## 2018-10-08 PROCEDURE — 82607 VITAMIN B-12: CPT | Performed by: INTERNAL MEDICINE

## 2018-10-08 PROCEDURE — 80053 COMPREHEN METABOLIC PANEL: CPT | Performed by: NURSE PRACTITIONER

## 2018-10-08 RX ORDER — SODIUM CHLORIDE 0.9 % (FLUSH) 0.9 %
3 SYRINGE (ML) INJECTION EVERY 12 HOURS SCHEDULED
Status: DISCONTINUED | OUTPATIENT
Start: 2018-10-08 | End: 2018-10-08 | Stop reason: HOSPADM

## 2018-10-08 RX ORDER — FENTANYL CITRATE 50 UG/ML
50 INJECTION, SOLUTION INTRAMUSCULAR; INTRAVENOUS
Status: DISCONTINUED | OUTPATIENT
Start: 2018-10-08 | End: 2018-10-08 | Stop reason: HOSPADM

## 2018-10-08 RX ORDER — FENTANYL CITRATE 50 UG/ML
INJECTION, SOLUTION INTRAMUSCULAR; INTRAVENOUS AS NEEDED
Status: DISCONTINUED | OUTPATIENT
Start: 2018-10-08 | End: 2018-10-08 | Stop reason: SURG

## 2018-10-08 RX ORDER — PROPOFOL 10 MG/ML
VIAL (ML) INTRAVENOUS AS NEEDED
Status: DISCONTINUED | OUTPATIENT
Start: 2018-10-08 | End: 2018-10-08 | Stop reason: SURG

## 2018-10-08 RX ORDER — MEPERIDINE HYDROCHLORIDE 25 MG/ML
12.5 INJECTION INTRAMUSCULAR; INTRAVENOUS; SUBCUTANEOUS
Status: DISCONTINUED | OUTPATIENT
Start: 2018-10-08 | End: 2018-10-08 | Stop reason: HOSPADM

## 2018-10-08 RX ORDER — POTASSIUM CHLORIDE 7.45 MG/ML
10 INJECTION INTRAVENOUS
Status: COMPLETED | OUTPATIENT
Start: 2018-10-08 | End: 2018-10-08

## 2018-10-08 RX ORDER — MAGNESIUM SULFATE HEPTAHYDRATE 40 MG/ML
2 INJECTION, SOLUTION INTRAVENOUS AS NEEDED
Status: DISCONTINUED | OUTPATIENT
Start: 2018-10-08 | End: 2018-10-12 | Stop reason: HOSPADM

## 2018-10-08 RX ORDER — IPRATROPIUM BROMIDE AND ALBUTEROL SULFATE 2.5; .5 MG/3ML; MG/3ML
3 SOLUTION RESPIRATORY (INHALATION) ONCE AS NEEDED
Status: DISCONTINUED | OUTPATIENT
Start: 2018-10-08 | End: 2018-10-08 | Stop reason: HOSPADM

## 2018-10-08 RX ORDER — MAGNESIUM SULFATE HEPTAHYDRATE 40 MG/ML
4 INJECTION, SOLUTION INTRAVENOUS AS NEEDED
Status: DISCONTINUED | OUTPATIENT
Start: 2018-10-08 | End: 2018-10-12 | Stop reason: HOSPADM

## 2018-10-08 RX ORDER — SODIUM CHLORIDE 0.9 % (FLUSH) 0.9 %
3-10 SYRINGE (ML) INJECTION AS NEEDED
Status: DISCONTINUED | OUTPATIENT
Start: 2018-10-08 | End: 2018-10-08 | Stop reason: HOSPADM

## 2018-10-08 RX ORDER — POTASSIUM CHLORIDE 20 MEQ/1
40 TABLET, EXTENDED RELEASE ORAL AS NEEDED
Status: DISCONTINUED | OUTPATIENT
Start: 2018-10-08 | End: 2018-10-12 | Stop reason: HOSPADM

## 2018-10-08 RX ORDER — ONDANSETRON 2 MG/ML
4 INJECTION INTRAMUSCULAR; INTRAVENOUS ONCE AS NEEDED
Status: DISCONTINUED | OUTPATIENT
Start: 2018-10-08 | End: 2018-10-08 | Stop reason: HOSPADM

## 2018-10-08 RX ORDER — MIDAZOLAM HYDROCHLORIDE 1 MG/ML
INJECTION INTRAMUSCULAR; INTRAVENOUS AS NEEDED
Status: DISCONTINUED | OUTPATIENT
Start: 2018-10-08 | End: 2018-10-08 | Stop reason: SURG

## 2018-10-08 RX ORDER — LIDOCAINE HYDROCHLORIDE 20 MG/ML
INJECTION, SOLUTION INFILTRATION; PERINEURAL AS NEEDED
Status: DISCONTINUED | OUTPATIENT
Start: 2018-10-08 | End: 2018-10-08 | Stop reason: SURG

## 2018-10-08 RX ORDER — POTASSIUM CHLORIDE 7.45 MG/ML
10 INJECTION INTRAVENOUS
Status: DISCONTINUED | OUTPATIENT
Start: 2018-10-08 | End: 2018-10-12 | Stop reason: HOSPADM

## 2018-10-08 RX ORDER — SODIUM CHLORIDE, SODIUM LACTATE, POTASSIUM CHLORIDE, CALCIUM CHLORIDE 600; 310; 30; 20 MG/100ML; MG/100ML; MG/100ML; MG/100ML
100 INJECTION, SOLUTION INTRAVENOUS CONTINUOUS
Status: DISCONTINUED | OUTPATIENT
Start: 2018-10-08 | End: 2018-10-09

## 2018-10-08 RX ORDER — MAGNESIUM SULFATE HEPTAHYDRATE 40 MG/ML
4 INJECTION, SOLUTION INTRAVENOUS ONCE
Status: COMPLETED | OUTPATIENT
Start: 2018-10-08 | End: 2018-10-08

## 2018-10-08 RX ORDER — POTASSIUM CHLORIDE 1.5 G/1.77G
40 POWDER, FOR SOLUTION ORAL AS NEEDED
Status: DISCONTINUED | OUTPATIENT
Start: 2018-10-08 | End: 2018-10-12 | Stop reason: HOSPADM

## 2018-10-08 RX ORDER — OXYCODONE HYDROCHLORIDE AND ACETAMINOPHEN 5; 325 MG/1; MG/1
1 TABLET ORAL ONCE AS NEEDED
Status: DISCONTINUED | OUTPATIENT
Start: 2018-10-08 | End: 2018-10-08 | Stop reason: HOSPADM

## 2018-10-08 RX ADMIN — MAGNESIUM SULFATE HEPTAHYDRATE 4 G: 40 INJECTION, SOLUTION INTRAVENOUS at 06:20

## 2018-10-08 RX ADMIN — MORPHINE SULFATE 1 MG: 2 INJECTION, SOLUTION INTRAMUSCULAR; INTRAVENOUS at 05:43

## 2018-10-08 RX ADMIN — FENTANYL CITRATE 100 MCG: 50 INJECTION INTRAMUSCULAR; INTRAVENOUS at 14:53

## 2018-10-08 RX ADMIN — PROPOFOL 50 MG: 10 INJECTION, EMULSION INTRAVENOUS at 14:57

## 2018-10-08 RX ADMIN — POTASSIUM CHLORIDE 10 MEQ: 10 INJECTION, SOLUTION INTRAVENOUS at 09:16

## 2018-10-08 RX ADMIN — PROPOFOL 160 MCG/KG/MIN: 10 INJECTION, EMULSION INTRAVENOUS at 14:57

## 2018-10-08 RX ADMIN — Medication 3 ML: at 20:33

## 2018-10-08 RX ADMIN — PROPOFOL 30 MG: 10 INJECTION, EMULSION INTRAVENOUS at 15:08

## 2018-10-08 RX ADMIN — PROPOFOL 50 MG: 10 INJECTION, EMULSION INTRAVENOUS at 15:19

## 2018-10-08 RX ADMIN — PANTOPRAZOLE SODIUM 8 MG/HR: 40 INJECTION, POWDER, FOR SOLUTION INTRAVENOUS at 07:31

## 2018-10-08 RX ADMIN — POTASSIUM CHLORIDE 10 MEQ: 10 INJECTION, SOLUTION INTRAVENOUS at 07:30

## 2018-10-08 RX ADMIN — PANTOPRAZOLE SODIUM 8 MG/HR: 40 INJECTION, POWDER, FOR SOLUTION INTRAVENOUS at 02:08

## 2018-10-08 RX ADMIN — THIAMINE HYDROCHLORIDE 100 ML/HR: 100 INJECTION, SOLUTION INTRAMUSCULAR; INTRAVENOUS at 09:08

## 2018-10-08 RX ADMIN — PROPOFOL 30 MG: 10 INJECTION, EMULSION INTRAVENOUS at 15:13

## 2018-10-08 RX ADMIN — MORPHINE SULFATE 1 MG: 2 INJECTION, SOLUTION INTRAMUSCULAR; INTRAVENOUS at 02:35

## 2018-10-08 RX ADMIN — POTASSIUM CHLORIDE 10 MEQ: 10 INJECTION, SOLUTION INTRAVENOUS at 06:20

## 2018-10-08 RX ADMIN — SODIUM CHLORIDE, POTASSIUM CHLORIDE, SODIUM LACTATE AND CALCIUM CHLORIDE: 600; 310; 30; 20 INJECTION, SOLUTION INTRAVENOUS at 14:53

## 2018-10-08 RX ADMIN — PANTOPRAZOLE SODIUM 8 MG/HR: 40 INJECTION, POWDER, FOR SOLUTION INTRAVENOUS at 19:36

## 2018-10-08 RX ADMIN — MIDAZOLAM HYDROCHLORIDE 2 MG: 1 INJECTION, SOLUTION INTRAMUSCULAR; INTRAVENOUS at 14:53

## 2018-10-08 RX ADMIN — SODIUM CHLORIDE, POTASSIUM CHLORIDE, SODIUM LACTATE AND CALCIUM CHLORIDE 125 ML/HR: 600; 310; 30; 20 INJECTION, SOLUTION INTRAVENOUS at 20:33

## 2018-10-08 RX ADMIN — PROPOFOL 50 MG: 10 INJECTION, EMULSION INTRAVENOUS at 15:01

## 2018-10-08 RX ADMIN — POTASSIUM CHLORIDE 10 MEQ: 10 INJECTION, SOLUTION INTRAVENOUS at 08:20

## 2018-10-08 RX ADMIN — LIDOCAINE HYDROCHLORIDE 60 MG: 20 INJECTION, SOLUTION INFILTRATION; PERINEURAL at 14:57

## 2018-10-08 RX ADMIN — MORPHINE SULFATE 1 MG: 2 INJECTION, SOLUTION INTRAMUSCULAR; INTRAVENOUS at 20:33

## 2018-10-08 RX ADMIN — MORPHINE SULFATE 1 MG: 2 INJECTION, SOLUTION INTRAMUSCULAR; INTRAVENOUS at 09:07

## 2018-10-08 RX ADMIN — LORAZEPAM 2 MG: 2 INJECTION INTRAMUSCULAR; INTRAVENOUS at 16:14

## 2018-10-08 NOTE — PROGRESS NOTES
Discharge Planning Assessment   [facility]     Patient Name: Tavo Murrieta  MRN: [id number]  Today's Date: 10/8/2018    Admit Date: 10/7/2018          Discharge Needs Assessment     Row Name 10/08/18 3336       Living Environment    Lives With significant other    Name(s) of Who Lives With Patient Lianne Hartley    Current Living Arrangements home/apartment/condo    Primary Care Provided by self    Provides Primary Care For no one    Family Caregiver if Needed parent(s);significant other    Quality of Family Relationships helpful;involved;supportive    Able to Return to Prior Arrangements yes       Resource/Environmental Concerns    Resource/Environmental Concerns none    Transportation Concerns car, none       Transition Planning    Patient/Family Anticipates Transition to home with family    Patient/Family Anticipated Services at Transition none    Transportation Anticipated family or friend will provide       Discharge Needs Assessment    Readmission Within the Last 30 Days no previous admission in last 30 days    Concerns to be Addressed substance/tobacco abuse/use    Concerns Comments SS spoke with pt regarding substance abuse and he is agreeable to inpatient treatment for his issues.  SS provided the pt with a list of rehab facilities in the area for alcohol abuse.      Equipment Currently Used at Home none    Anticipated Changes Related to Illness none    Equipment Needed After Discharge none    Outpatient/Agency/Support Group Needs --   Pt does not have home health at this time.     Discharge Facility/Level of Care Needs substance abuse facility    Current Discharge Risk substance use/abuse            Discharge Plan     Row Name 10/08/18 8029       Plan    Plan Pt lives at home with his fiance, Lianne Hartley and plans to return home at discharge. Pt does not have home health or DME. Pt has good family support from his parents and fiance. Pt does not have a POA or Advance Directive.  Pt will be transported home  via private auto.  SS will continue to follow and will assist as needed.     Patient/Family in Agreement with Plan yes          Demographic Summary     Row Name 10/08/18 1325       General Information    Admission Type inpatient    Referral Source nursing    Reason for Consult discharge planning    Preferred Language English     Used During This Interaction no            Functional Status     Row Name 10/08/18 1326       Functional Status    Usual Activity Tolerance excellent    Current Activity Tolerance excellent      Margo Anderson

## 2018-10-08 NOTE — ANESTHESIA POSTPROCEDURE EVALUATION
Patient: Tavo Murrieta    Procedure Summary     Date:  10/08/18 Room / Location:  [facility] OR  /  [facility] OR    Anesthesia Start:  1453 Anesthesia Stop:  1526    Procedures:       ESOPHAGOGASTRODUODENOSCOPY (N/A Esophagus)      COLONOSCOPY (N/A ) Diagnosis:       Anemia, unspecified type      (Anemia, unspecified type [D64.9])    Surgeon:  Parish Owen MD Provider:  Wander Centeno MD    Anesthesia Type:  general ASA Status:  3          Anesthesia Type: general  Last vitals  BP   123/83 (10/08/18 1555)   Temp   97.9 °F (36.6 °C) (10/08/18 1525)   Pulse   108 (10/08/18 1555)   Resp   18 (10/08/18 1555)     SpO2   100 % (10/08/18 1555)     Post Anesthesia Care and Evaluation    Patient location during evaluation: PACU  Patient participation: complete - patient participated  Level of consciousness: awake and alert  Pain score: 1  Pain management: adequate  Airway patency: patent  Anesthetic complications: No anesthetic complications  PONV Status: controlled  Cardiovascular status: acceptable  Respiratory status: acceptable  Hydration status: acceptable

## 2018-10-08 NOTE — OP NOTE
Tavo Murrieta  10/7/2018 - 10/8/2018      Operative Progress Note:    Surgeon and Assistant: Dr. Owen    Pre-Operative Diagnosis: anemia    Post-Operative Diagnosis: anemia with evidence of recent bleed and stomach however source of bleeding seen, edematous mucosa body of stomach, hiatal hernia, inflammation of cecal wall otherwise normal colonoscopy    Procedure(s):  EGD with biopsy and colonoscopy to cecum with biopsy    Type of Anesthesia Administered: IV general    Estimated Blood Loss: Minimal    Blood Products: None    Specimen Obtained/Removed: edematous gastric mucosa biopsy and cecal biopsy    Complication(s):  None    Graft/Implant/Prosthetics/Implanted Device/Transplants: None    Indication: patient is a 38-year-old white male with severe anemia    Findings: duodenum normal, edematous posterior wall of stomach, small hiatal hernia, esophagus normal, blood present stomach, no acute bleeding site, evidence of ulcerations.  Or anything else that would explain the blood in the stomach except for the edematous mucosa.  Colon was normal in its entirety except for petechial type hemorrhages of the cecal wall.  Biopsies were taken    Operative Report:  Patient was taken operating room and placed in a left lateral decubitus position.  IV sedation was given per anesthesia.  Gastroscope was introduced and passed into the duodenum.  The scope was slowly withdrawn.  I examined the duodenum, stomach and esophagus thoroughly.  Biopsies were taken as indicated above.  Findings are listed as above.    Patient was taken to the operating room and placed in a left lateral decubitus position.  IV sedation was given.  Colonoscope was introduced and passed all the way to cecum.  The scope was slowly withdrawn.  Cecum, ascending colon, hepatic flexure, transverse colon, splenic flexure, descending colon, sigmoid colon, and rectum were all normal except for inflammation and petechial hemorrhages of the cecal wall.  Pictures were  taken.  Biopsy was taken.  Digital rectal exam was unremarkable.  The procedures and terminated.  Patient tolerated procedure very well and was returned recovery room in satisfactory condition.    No clear-cut source of the blood loss was seen.    Patient will need a repeat colonoscopy in 10 years or less.       Electronically Signed by: Parish Owen MD        Dictated Utilizing Dragon Dictation

## 2018-10-08 NOTE — PLAN OF CARE
Problem: Alcohol Withdrawal Acute, Risk/Actual (Adult)  Goal: Signs and Symptoms of Listed Potential Problems Will be Absent, Minimized or Managed (Alcohol Withdrawal Acute, Risk/Actual)  Outcome: Ongoing (interventions implemented as appropriate)   10/07/18 1736   Goal/Outcome Evaluation   Problems Assessed (Alcohol Withdrawal Syndrome) all   Problems Present (Alcohol W/D Syndrome) effects of SABI (alcohol withdrawal syndrome)       Problem: Patient Care Overview  Goal: Plan of Care Review  Outcome: Ongoing (interventions implemented as appropriate)   10/07/18 1736 10/07/18 1915   Coping/Psychosocial   Plan of Care Reviewed With --  patient   Coping/Psychosocial   Patient Agreement with Plan of Care agrees --    Coping/Psychosocial   Consent Given to Review Plan with [facility] --    Plan of Care Review   Progress no change --    OTHER   Outcome Summary pt admit to CCU. recieving blood and electrolyte replacement --      Goal: Discharge Needs Assessment  Outcome: Ongoing (interventions implemented as appropriate)   10/07/18 1736   Discharge Needs Assessment   Readmission Within the Last 30 Days no previous admission in last 30 days   Concerns to be Addressed mental health;substance/tobacco abuse/use   Concerns Comments initially came to ER for alcohol withdraw help in [facility]   Patient/Family Anticipates Transition to home with family;home   Patient/Family Anticipated Services at Transition none   Transportation Concerns car, none   Transportation Anticipated family or friend will provide   Offered/Gave Vendor List no   Current Discharge Risk substance use/abuse   Discharge Needs Assessment,    Outpatient/Agency/Support Group Needs outpatient substance abuse treatment (specify)       Problem: Anemia (Adult)  Goal: Identify Related Risk Factors and Signs and Symptoms  Outcome: Ongoing (interventions implemented as appropriate)   10/07/18 1736   Anemia (Adult)   Related Risk Factors (Anemia) bleeding;poor  nutrition   Signs and Symptoms (Anemia) pallor;syncope/near syncope     Goal: Symptom Improvement  Outcome: Ongoing (interventions implemented as appropriate)   10/07/18 3956   Anemia (Adult)   Symptom Improvement making progress toward outcome

## 2018-10-08 NOTE — PAYOR COMM NOTE
"  [facility]  NPI: [id number]    Utilization Review   Contact:Sabina Prajapati MSN, APRN, NP-C  Phone: [phone]  Fax: [phone]    [employer]/Attn: Loreta Castro Req  REF: [id number]  DX: D64.9, F10.239, K92.2  Tavo Portillo  (38 y.o. Male)     Date of Birth Social Security Number Address Home Phone MRN    1980  [facility] [address]  [facility] [id number] [phone] [id number]    [employer]ian Marital Status          [facility] Single       Admission Date Admission Type Admitting Provider Attending Provider Department, Room/Bed    10/7/18 Emergency Jay Araujo MD Khan, Hafiz Sarfraz, MD [facility], 10/    Discharge Date Discharge Disposition Discharge Destination                       Attending Provider:  Sydney Watson MD    Allergies:  No Known Allergies    Isolation:  None   Infection:  None   Code Status:  CPR    Ht:  185.4 cm (73\")   Wt:  77.7 kg (171 lb 3.2 oz)    Admission Cmt:  None   Principal Problem:  None                Active Insurance as of 10/7/2018     Primary Coverage     Payor Plan Insurance Group Employer/Plan Group    [employer] [place] [employer] [place]      Payor Plan Address Payor Plan Phone Number Effective From Effective To    [address] [address] [phone] 10/7/2018     [facility] [id number]       Subscriber Name Subscriber Birth Date Member ID       TAVO PORTILLO 1980 [id number]                 Emergency Contacts      (Rel.) Home Phone Work Phone Mobile Phone    JoseBeth (Sister) [phone] -- --    Crystal Portillo (Mother) -- -- [phone]    Lianne Hartley (Significant Other) -- -- [phone]               History & Physical      Jay Araujo MD at 10/7/2018  3:52 PM            [facility] Medicine Services  History & Physical          Patient Identification:  Name:  Tavo Portillo  Age:  38 y.o.  Sex:  male  :  1980  MRN:  [id number]   Visit Number:  [id number]  Primary Care Physician:  " "Duc Pena MD    I have seen the patient in conjunction with JONATHAN Tello and I agree with the following statements:     Subjective     Chief complaint: ETOH detox    Patient seen with sister as well as Lovely SMALLWOOD, I had permission from the patient to discuss his case with him in front of his sister.    History of presenting illness:    Mr. Murrieta is a 38 year old male who presented to [facility] ER on 10/7/18 for ETOH detox. During his medical clearance he was found to have a hemoglobin of 4.6 HCT of 15.3, with a positive hemoccult. He also admitted to dark tarry stools having around 2 episodes a day for around a week he states. He also admits to vomiting, which he states does look like coffee ground emesis, he states this has been on/off for a \"few weeks\". He states he started drinking heavily again around the last \"month or two\" after loosing his job, he became depressed and started drinking more heavily. He has had a seizure about 3 years ago with withdrawal from ETOH. He denies fever, no abdominal pain. We will admit him to CCU for further monitoring and evaluation.  He tells me he drinks about a pint a day.  He has had withdrawal seizures in the past.  His main complaint is leg and arm pain, the leg pain is just like an ache right greater than left the arm pain is more of a sharp pain and this is the arm that has had potassium going through it.  He is uncertain exactly when the pain started.  He has had no associated fever.  This is a constant pain but intensity varies.  Patient states he does not use NSAIDs.  He thinks he probably started drinking in his 20s.  He denies any benzodiazepine use but did acknowledge a THC use.  She states he has been getting lightheaded when he gets up after he gets a \"head rush\".  This has been going on for a few days and it will then turned into him feeling like he is going to pass out.    His Ethanol level in the ER is  195, his UDS was positive for benzo's " and THC, tylenol level was normal. He also has a potassium of 2.8, magnesium level of 1.2, and sodium level of 133, creatinine is normal at 0.60. WBC is 5.65.  He is not  but lives with a fiancé  his next of kin decision maker is his mother, Katrin Murrieta [phone].       ---------------------------------------------------------------------------------------------------------------------   Review of Systems   Constitutional: Positive for fatigue. Negative for chills, diaphoresis and fever.   HENT: Negative for congestion and rhinorrhea.    Eyes: Negative for photophobia and visual disturbance.   Respiratory: Negative for cough, shortness of breath and wheezing.    Cardiovascular: Negative for chest pain and leg swelling.   Gastrointestinal: Positive for blood in stool, diarrhea (dark tarry stools), nausea and vomiting (coffee ground emesis ). Negative for abdominal distention, abdominal pain and constipation.   Endocrine: Negative for cold intolerance and heat intolerance.   Genitourinary: Negative for difficulty urinating.   Musculoskeletal: Negative for arthralgias and myalgias.   Skin: Negative for color change and pallor.   Allergic/Immunologic: Negative for environmental allergies.   Neurological: Negative for dizziness, weakness and light-headedness.   Hematological: Negative for adenopathy.   Psychiatric/Behavioral: Negative for agitation and confusion.      ---------------------------------------------------------------------------------------------------------------------   Past Medical History:   Diagnosis Date   • Alcohol abuse    • Seizure due to alcohol withdrawal (CMS/HCC)    • Seizures (CMS/HCC)      History reviewed. No pertinent surgical history.  History reviewed. No pertinent family history.  Social History     Social History   • Marital status: Single     Social History Main Topics   • Smoking status: Never Smoker   • Smokeless tobacco: Current User     Types: Snuff   • Alcohol use Yes       Comment: 1 pint liquor daily   • Drug use: No     Other Topics Concern   • Not on file     ---------------------------------------------------------------------------------------------------------------------   Allergies:  Patient has no known allergies.  ---------------------------------------------------------------------------------------------------------------------     Medications below are reported home medications pulling from within the system; at this time, these medications have not been reconciled unless otherwise specified and are in the verification process for further verifcation as current home medications.    Prior to Admission Medications     None        Hospital Scheduled Meds:    sodium chloride      magnesium sulfate 2 g Intravenous Q2H   Morphine 4 mg Intravenous Once   potassium chloride 10 mEq Intravenous Q1H       pantoprazole 8 mg/hr Last Rate: 8 mg/hr (10/07/18 1520)     ---------------------------------------------------------------------------------------------------------------------   Objective       Vital Signs:  Temp:  [98.1 °F (36.7 °C)] 98.1 °F (36.7 °C)  Heart Rate:  [117-135] 117  Resp:  [20] 20  BP: (112-119)/(68-70) 115/69  1    10/07/18  1334   Weight: 77.1 kg (170 lb)     Body mass index is 22.43 kg/m².  ---------------------------------------------------------------------------------------------------------------------       Physical Exam    Physical Exam:  Constitutional:  Well-developed and well-nourished.  Face is symmetric  HENT:  Head: Normocephalic and atraumatic.  Mouth:  Moist mucous membranes.    Eyes:  Conjunctivae and EOM are normal.  Pupils are equal, round, and reactive to light.  No scleral icterus.  Mucus membranes of the eyes are pale  Neck:  Neck supple.  No JVD present.    Cardiovascular:  Normal rate, regular rhythm.  with no murmur.  Trace edema of the left ankle none of the right  Pulmonary/Chest:  No respiratory distress, no wheezes, no crackles, with  normal breath sounds and good air movement.  No increased respiratory effort he is not using accessory muscles and can speak in full sentences without difficulty  Abdominal:  Soft.  Bowel sounds are present.  No distension and no tenderness.  No organomegaly appreciated   Musculoskeletal:  No edema, (trace left ankle edema to my exam) no tenderness, and no deformity.  No red or swollen joints anywhere.  IV sites look good he has excellent palpable distal pulses right were slightly greater than left   Neurological:  Alert and oriented to person, place, and time.  No cranial nerve deficit.  No tongue deviation.  No facial droop.  No slurred speech.   Skin:  Skin is warm and dry.  No rash noted.  Pale.   Psychiatric:  Normal mood and affect.   Peripheral vascular:  No edema and strong pulses on all 4 extremities.  ---------------------------------------------------------------------------------------------------------------------  EKG:  EKG reviewed, QTC is acceptable, sinus tach without evidence of ischemia  Telemetry:    I have personally looked at the entry strips and he is in sinus rhythm  ---------------------------------------------------------------------------------------------------------------------     Results from last 7 days  Lab Units 10/07/18  1440   WBC 10*3/mm3 5.65   HEMOGLOBIN g/dL 4.6*   HEMATOCRIT % 15.3*   MCV fL 87.4   MCHC g/dL 30.1*   PLATELETS 10*3/mm3 210           Results from last 7 days  Lab Units 10/07/18  1354   SODIUM mmol/L 133*   POTASSIUM mmol/L 2.8*   MAGNESIUM mg/dL 1.2*   CHLORIDE mmol/L 97*   CO2 mmol/L 23.5*   BUN mg/dL 9   CREATININE mg/dL 0.60   EGFR IF NONAFRICN AM mL/min/1.73 150   CALCIUM mg/dL 7.2*   GLUCOSE mg/dL 134*   ALBUMIN g/dL 3.40*   BILIRUBIN mg/dL 0.8   ALK PHOS U/L 152*   AST (SGOT) U/L 111*   ALT (SGPT) U/L 32   Estimated Creatinine Clearance: 180.3 mL/min (by C-G formula based on SCr of 0.6 mg/dL).  No results found for: AMMONIA          No results found for:  HGBA1C  No results found for: TSH, FREET4  No results found for: PREGTESTUR, PREGSERUM, HCG, HCGQUANT  Pain Management Panel     Pain Management Panel Latest Ref Rng & Units 10/7/2018    AMPHETAMINES SCREEN, URINE Negative Negative    BARBITURATES SCREEN Negative Negative    BENZODIAZEPINE SCREEN, URINE Negative Positive(A)    BUPRENORPHINE Negative Negative    COCAINE SCREEN, URINE Negative Negative    METHADONE SCREEN, URINE Negative Negative                   ---------------------------------------------------------------------------------------------------------------------  Imaging Results (last 7 days)     ** No results found for the last 168 hours. **          Cultures: none     I have personally reviewed the radiology images and read the final radiology report.  ---------------------------------------------------------------------------------------------------------------------  Assessment / Plan       Assessment and Plan:    ETOH withdrawal  Acute GI Bleed  Acute anemia r/t blood loss  Hypokalemia  Hypomagnesemia  Hyponatremia   Elevated Transaminase     ETOH withdrawal: CIWA protocol ordered, banana bag daily for 3 days, seizure precautions. Will monitor closely.  Possibly tomorrow once his alcohol has been metabolized consider instigation of scheduled Librium    Acute GI bleed: will trend H/H every 8 hours, the ED has ordered 2 units of PRBC for now. Repeat H/H after. protonix 40mg gtt ordered. General surgery consulted. Will monitor closely. STAT CT of the abdomen/pelvis ordered without contrast.  The CT is to look for any evidence of a nodular small cirrhotic liver.  INR is 1.3 but most likely he is vitamin deficient, I'm going to give a dose of vitamin K.  I did discuss with Dr. Owen, I have placed the patient on clear liquids and nothing by mouth after midnight and he is going to do an EGD and colonoscopy tomorrow, he is going to call in a prep.  He is tachycardic and was getting orthostasis type  symptoms but his blood pressure is acceptable at this time and I do not think he is actively bleeding.  I discussed with the ER provider he did a rectal exam that showed brown stool that was heme positive.  Will check anemia studies in the a.m. but these may be somewhat affected by having had a transfusion today.    Hypokalemia: level is 2.8, protocol replacement ordered.     Arm pain probably from potassium    Leg pain, neurovascularly intact, low-dose morphine added    Hypomagnesemia: 1.2, protocol replacement ordered.     Hyponatremia: level is 133, borderline low, will monitor closely with IV fluids will check sodium every 6 hours, with parameters to call    Elevated Transaminase: ALT is 111, acute hep panel ordered, likely related to his ETOH use. Acute hep panel ordered. INR is 1.30, vitamin K to be given, repeat in the AM>Will monitor trend.     DVT prophylaxis: no pharmacological prophylaxis due to anemia, SCDs ordered.     Patient is high risk for the following reasons: ETOH withdrawal, Acute GI bleed, Acute anemia r/t blood loss       Ratna Joyner, APRN  10/07/18  3:53 PM  ---------------------------------------------------------------------------------------------------------------------       Electronically signed by Jay Araujo MD at 10/7/2018  5:20 PM          Emergency Department Notes      Earnest Balderas PA at 10/7/2018  3:53 PM     Attestation signed by Paul Craven MD at 10/7/2018  5:23 PM          For this patient encounter, I reviewed the NP or PA documentation, treatment plan, and medical decision making. Paul Craven MD 10/7/2018 5:23 PM                  Subjective   38-year-old white male presents secondary for need for alcohol detox.  He states that he has been drinking approximately one pack per day over the past 4-5 months.  He denies any abdominal pain.  He states that over the last week or so he has had some dark tarry stools.  He has had a few episodes of  vomiting.  He states that a couple times he's noticed some dark specks.  No bright red blood.  He voices no other complaints at this time.            Review of Systems   Constitutional: Negative.  Negative for fever.   HENT: Negative.    Respiratory: Negative.    Cardiovascular: Negative.  Negative for chest pain.   Gastrointestinal: Negative.  Negative for abdominal pain.   Endocrine: Negative.    Genitourinary: Negative.  Negative for dysuria.   Skin: Negative.    Neurological: Negative.    Psychiatric/Behavioral: Negative.    All other systems reviewed and are negative.      Past Medical History:   Diagnosis Date   • Alcohol abuse    • Seizure due to alcohol withdrawal (CMS/HCC)    • Seizures (CMS/HCC)        No Known Allergies    History reviewed. No pertinent surgical history.    History reviewed. No pertinent family history.    Social History     Social History   • Marital status: Single     Social History Main Topics   • Smoking status: Never Smoker   • Smokeless tobacco: Current User     Types: Snuff   • Alcohol use Yes      Comment: 1 pint liquor daily   • Drug use: No     Other Topics Concern   • Not on file           Objective   Physical Exam   Constitutional: He is oriented to person, place, and time. He appears well-developed and well-nourished. No distress.   pale   HENT:   Head: Normocephalic and atraumatic.   Right Ear: External ear normal.   Left Ear: External ear normal.   Nose: Nose normal.   Eyes: Pupils are equal, round, and reactive to light. Conjunctivae and EOM are normal.   Neck: Normal range of motion. Neck supple. No JVD present. No tracheal deviation present.   Cardiovascular: Regular rhythm and normal heart sounds.  Tachycardia present.    No murmur heard.  Pulmonary/Chest: Effort normal and breath sounds normal. No respiratory distress. He has no wheezes.   Abdominal: Soft. Bowel sounds are normal. There is no tenderness.   Genitourinary: Rectal exam shows guaiac positive stool.    Genitourinary Comments: Stool was brown.  No bright red blood.  No melena.  This was heme positive.   Musculoskeletal: Normal range of motion. He exhibits no edema or deformity.   Neurological: He is alert and oriented to person, place, and time. No cranial nerve deficit.   Skin: Skin is warm and dry. No rash noted. He is not diaphoretic. No erythema. No pallor.   Psychiatric: He has a normal mood and affect. His behavior is normal. Thought content normal.   Nursing note and vitals reviewed.      Procedures          ED Course  ED Course as of Oct 07 1557   Sun Oct 07, 2018   1522 D/w Dr Owen- would be happy to see patient in consult. Paged the hospitalist  [JI]      ED Course User Index  [JI] Earnest Balderas PA                  [facility]  Number of Diagnoses or Management Options  Alcohol abuse: new and requires workup  Anemia, unspecified type: new and requires workup  Hypokalemia: new and requires workup  Hypomagnesemia: new and requires workup  Upper GI bleeding: new and requires workup     Amount and/or Complexity of Data Reviewed  Clinical lab tests: reviewed and ordered  Tests in the radiology section of CPT®:  ordered and reviewed  Discuss the patient with other providers: yes    Risk of Complications, Morbidity, and/or Mortality  Presenting problems: high  Management options: high    Critical Care  Total time providing critical care: 30-74 minutes (40)        Final diagnoses:   Anemia, unspecified type   Upper GI bleeding   Hypokalemia   Hypomagnesemia   Alcohol abuse            Earnest Balderas PA  10/07/18 1553      Electronically signed by Paul Craven MD at 10/7/2018  5:23 PM             ICU Vital Signs     Row Name 10/08/18 0820 10/08/18 0730 10/08/18 0647 10/08/18 0645 10/08/18 0615       Vitals    Temp 98.1 °F (36.7 °C) 98.1 °F (36.7 °C)  --  --  --    Temp src Oral  --  --  --  --    Pulse  -- 101 99 98 103    Resp 18 18  -- 16  --    Resp Rate Source  -- Visual  --  --  --    /85 121/79   -- 120/78 120/79    Noninvasive MAP (mmHg)  -- 95  -- 91 92       Oxygen Therapy    SpO2  -- 100 % 98 % 99 % 99 %    Pulse Oximetry Type  --  -- Continuous  --  --    Device (Oxygen Therapy)  --  -- room air  --  --    Row Name 10/08/18 0600 10/08/18 0530 10/08/18 0515 10/08/18 0500 10/08/18 0445       Height and Weight    Weight 77.7 kg (171 lb 3.2 oz)  --  --  --  --    Weight Method Bed scale  --  --  --  --       Vitals    Pulse 102 102 103 105 115    Resp 17  --  -- 19 16    /76 124/78 124/74 124/74 113/75    Noninvasive MAP (mmHg) 88 89 86 87 85       Oxygen Therapy    SpO2 99 % 98 % 99 % 98 % 100 %    Row Name 10/08/18 0430 10/08/18 0415 10/08/18 0400 10/08/18 0345 10/08/18 0330       Vitals    Temp  --  -- 98.4 °F (36.9 °C)  --  --    Temp src  --  -- Oral  --  --    Pulse 105 103 107 104 109    Resp  --  -- 17  --  --    /97 114/73 109/88 115/68 108/66    Noninvasive MAP (mmHg) 102 91 95 85 83       Oxygen Therapy    SpO2 98 % 98 % 98 % 99 % 100 %    Row Name 10/08/18 0315 10/08/18 0245 10/08/18 0230 10/08/18 0200 10/08/18 0145       Vitals    Pulse 106 115 107 112 114    Resp 19  --  -- 17  --    /63 115/62 113/64 114/67 114/65    Noninvasive MAP (mmHg) 74 79 77 79 79       Oxygen Therapy    SpO2 100 % 99 % 99 % 99 % 98 %    Row Name 10/08/18 0130 10/08/18 0125 10/08/18 0115 10/08/18 0100 10/08/18 0045       Vitals    Temp  -- 98.5 °F (36.9 °C)  --  --  --    Temp src  -- Oral  --  --  --    Pulse 112 112 112 110 114    Resp 14 16  -- 17 19    /66 116/68 113/72 108/68 112/65    Noninvasive MAP (mmHg) 77  -- 82 81 83       Oxygen Therapy    SpO2 97 % 98 % 99 % 100 % 99 %    Row Name 10/08/18 0025 10/08/18 0015 10/08/18 0000 10/07/18 2345 10/07/18 2315       Vitals    Temp 98.3 °F (36.8 °C)  -- 98.7 °F (37.1 °C)  --  --    Temp src Oral  -- Oral  --  --    Pulse 112 115 118 (!)  124 116    Resp 19  -- 15  --  --    /70 108/68 103/65 105/57 114/70    Noninvasive MAP (mmHg)  -- 83  79 68 82       Oxygen Therapy    SpO2 99 % 99 % 99 % 100 % 99 %    Device (Oxygen Therapy) room air  --  --  --  --    Row Name 10/07/18 2300 10/07/18 2245 10/07/18 2240 10/07/18 2237 10/07/18 2225       Vitals    Temp  --  -- 98.6 °F (37 °C)  -- 98.5 °F (36.9 °C)    Temp src  --  --  --  -- Oral    Pulse 116 115 117 (!)  129 (!)  128    Resp 13  -- 15 16 18    /74 122/71 122/76 113/64 110/76    Noninvasive MAP (mmHg) 84 84  -- 80  --    BP Location  --  --  --  -- Right arm    BP Method  --  --  --  -- Automatic    Patient Position  --  --  --  -- Lying       Oxygen Therapy    SpO2 99 % 99 % 99 % 100 % 100 %    Pulse Oximetry Type  --  --  --  -- Continuous    Device (Oxygen Therapy)  --  --  --  -- room air    Row Name 10/07/18 2217 10/07/18 2205 10/07/18 2202 10/07/18 2147 10/07/18 2133       Vitals    Temp  -- 98.7 °F (37.1 °C)  --  --  --    Temp src  -- Oral  --  --  --    Pulse (!)  121 (!)  121 120 116 112    Resp 16 17 14 19  --    /77 105/49 111/65 117/59 114/57    Noninvasive MAP (mmHg) 96 69 78 77 76       Oxygen Therapy    SpO2 99 % 100 % 100 % 98 % 98 %    Row Name 10/07/18 2123 10/07/18 2047 10/07/18 2032 10/07/18 2017 10/07/18 2002       Vitals    Temp  --  --  --  -- 98.7 °F (37.1 °C)    Temp src  --  --  --  -- Oral    Pulse 118 (!)  121 (!)  121 120 (!)  122    Resp  --  -- 16 16 13    /75 103/65 110/70 115/70 108/66    Noninvasive MAP (mmHg) 87 76 81 82 75       Oxygen Therapy    SpO2 99 % 100 % 100 % 100 % 100 %    Row Name 10/07/18 1948 10/07/18 1918 10/07/18 1915 10/07/18 1900 10/07/18 1825       Vitals    Temp  --  --  --  -- 98.4 °F (36.9 °C)    Pulse 120 117  -- 118 (!)  125    Resp  -- 17  -- 18 19    /68 100/63  -- 102/60 97/54    Noninvasive MAP (mmHg) 80 71  -- 71  --       Oxygen Therapy    SpO2 100 % 98 %  -- 100 % 100 %    Pulse Oximetry Type  --  --  --  -- Continuous    Device (Oxygen Therapy)  --  -- room air  -- room air    Row Name 10/07/18 1800 10/07/18  "1712 10/07/18 1700 10/07/18 1610 10/07/18 1600       Vitals    Temp  --  --  --  -- 98.4 °F (36.9 °C)    Temp src  --  --  --  -- Oral    Pulse (!)  125 (!)  123 (!)  122 (!)  128  --    Resp  -- 18  -- 22  --    BP (!)  73/56 109/58 101/61 127/73  --    Noninvasive MAP (mmHg) 64  -- 73  --  --       Oxygen Therapy    SpO2 100 % 100 % 98 % 100 %  --    Pulse Oximetry Type  -- Continuous  -- Continuous  --    Device (Oxygen Therapy)  -- room air  -- room air room air    Row Name 10/07/18 1553 10/07/18 1516 10/07/18 1503 10/07/18 1334          Height and Weight    Height  --  --  -- 185.4 cm (73\")     Height Method  --  --  -- Stated     Weight  --  --  -- 77.1 kg (170 lb)     Ideal Body Weight (IBW) (kg)  --  --  -- 84.86     BSA (Calculated - sq m)  --  --  -- 2.01 sq meters     BMI (Calculated)  --  --  -- 22.4     Weight in (lb) to have BMI = 25  --  --  -- 189.1        Vitals    Temp 99.3 °F (37.4 °C)  --  -- 98.1 °F (36.7 °C)     Temp src  --  --  -- Oral     Pulse (!)  123 117 (!)  123 (!)  135     Heart Rate Source  --  --  -- Monitor     Resp 20  --  -- 20     Resp Rate Source  --  --  -- Visual     /71 115/69 119/70 112/68     Noninvasive MAP (mmHg)  -- 78 83  --     BP Location  --  --  -- Right arm     BP Method  --  --  -- Automatic     Patient Position  --  --  -- Sitting        Oxygen Therapy    SpO2 100 % 100 % 100 % 100 %     Pulse Oximetry Type  --  --  -- Intermittent     Device (Oxygen Therapy)  --  --  -- room air            Physician Progress Notes (last 24 hours) (Notes from 10/7/2018  8:23 AM through 10/8/2018  8:23 AM)      Progress Notes signed by Ramon Pompa MD at 10/7/2018  6:50 PM         [facility] Physician - Brief Progress Note  PERMANENT  10/07/2018 18:41    [facility] [place] [facility]    JEFF PORTILLO    Date of Service 10/07/2018 18:41    HPI/Events of Note AICU Provider Assessment Note    Jeff Portillo is a 37yo M admitted " to [facility] ICU for ETOH WD syndrome, along with anemia of blood loss that has likely been going on for more than a week and thus compensated, most likely Gastric in origin.  He was originally going to be admitted to the   [facility] but his Hb on routine labs was noted to be 5 and he was thus transferred to ICU.  Protecting airway, not actively bleeding or vomitting.      Was given PRBC 2 units and Vit K  PPI drip was started an GSurg consulted for Endoscopy  BPs have been normotensive 100/70  -110s  H/H being monitored and more blood will be given to goal of Hb >7-8  He is also requiring PRN Ativan for tremors which are now under control  Poor insight into situation, will likely need a lot of CBT after we get the medical issues sorted out  PIV access x 2, no CVC, has not required pressors  Doesn't appear infected    INR 1.3  K+ 2.8  Mag 1.2    Lytes are being repleted with IV replacement currently, he runs the risk of a fatal arrhythmia if not corrected and monitored    Plan is to keep him NPO, T&C, monitor hemodynamics and scope in am, PPI drip  We will continue to monitor him overnight and assist with any critical care issues that arise.    Spoke to bedside RN who did not need any new orders    __y___   Video Assessment performed  __y___   Most recent labs reviewed  __y___   Vital Signs reviewed  __y___   Best Practices addressed:                 VTE prophylaxis:                 SUP (when indicated):                 Glycemic control:                      Please notify bedside physician when present or [facility] if glc > 180 X 2                 Sepsis guidelines:                 Lung protective strategy                 Targeted Temperature Management:    _y____     Spoke with bedside RN  _n____     Orders written      Contact [facility] for any needs if bedside physician is not present.      Interventions Major-Change in mental status - evaluation and management, Electrolyte abnormality -  evaluation and management, Hemorrhage - evaluation and management  Intermediate-Best-practice therapies (e.g. VTE, beta blocker, etc.), Bleeding - evaluation and treatment with blood products, Communication with other healthcare providers and/or family, Diagnostic test evaluation        Electronically Signed by: Ramon Pompa) on 10/07/2018 18:50    Electronically signed by Ramon Pompa MD at 10/7/2018  6:50 PM           Consult Notes (last 24 hours) (Notes from 10/7/2018  8:23 AM through 10/8/2018  8:23 AM)      Parish Owen MD at 10/7/2018  6:13 PM      Consult Orders:    1. Inpatient General Surgery Consult [[id number]] ordered by Ratna Joyner APRN at 10/07/18 1609              Patient interviewed, examined, and chart reviewed.  Discussed with Dr. Richardson.    We'll consult follow    Will do EGD and colonoscopy tomorrow.  Colonoscopy prep has been ordered    Electronically signed by Parish Owen MD at 10/7/2018  6:13 PM

## 2018-10-08 NOTE — ANESTHESIA PREPROCEDURE EVALUATION
Anesthesia Evaluation     Patient summary reviewed and Nursing notes reviewed   no history of anesthetic complications:  NPO Solid Status: > 8 hours  NPO Liquid Status: > 8 hours           Airway   Mallampati: II  TM distance: >3 FB  Neck ROM: full  no difficulty expected  Dental - normal exam     Pulmonary - negative pulmonary ROS and normal exam   (-) asthma, not a smoker  Cardiovascular - normal exam  Exercise tolerance: good (4-7 METS)    NYHA Classification: II    (+) dysrhythmias Tachycardia,   (-) hypertension, past MI, angina, CHF      Neuro/Psych  (+) seizures, psychiatric history,     (-) CVA  GI/Hepatic/Renal/Endo    (+)   liver disease,   (-) GERD, no renal disease, diabetes, hypothyroidism    Musculoskeletal (-) negative ROS    Abdominal  - normal exam    Bowel sounds: normal.   Substance History   (+) alcohol use,      OB/GYN negative ob/gyn ROS         Other - negative ROS                     Anesthesia Plan    ASA 3     general     intravenous induction   Anesthetic plan, all risks, benefits, and alternatives have been provided, discussed and informed consent has been obtained with: patient, father and mother.  Use of blood products discussed with father, patient and mother  Consented to blood products.

## 2018-10-08 NOTE — PROGRESS NOTES
[facility] HOSPITALIST PROGRESS NOTE     Patient Identification:  Name:  Tavo Murrieta  Age:  38 y.o.  Sex:  male  :  1980  MRN:  [id number]  Visit Number:  [id number]  Primary Care Provider:  Duc Pena MD    Length of stay:  1    Chief complaint:  38 y.o. old male admitted with Alcohol Problem          Subjective:    Patient was seen with Johnna RN.  Family is present in the room.  No acute issues overnight.  Patient did not require any Ativan since this morning.  Patient states that he is feeling better today.  He denies any complaints.  Denies any nausea, vomiting, abdominal pain.  Patient has been drinking prepped for colonoscopy and having loose bowel movements.  He received 2 units of PRBC yesterday and is receiving 1 unit of PRBC at this time.         Current Hospital Meds:    magnesium sulfate 4 g Intravenous Once   IV Fluids 1000 mL + additives 100 mL/hr Intravenous Daily   potassium chloride 10 mEq Intravenous Q1H   sodium chloride 3 mL Intravenous Q12H       pantoprazole 8 mg/hr Last Rate: 8 mg/hr (10/08/18 0731)     ----------------------------------------------------------------------------------------------------------------------  Vital Signs:  Temp:  [98.1 °F (36.7 °C)-99.3 °F (37.4 °C)] 98.1 °F (36.7 °C)  Heart Rate:  [] 103  Resp:  [12-] 12  BP: ()/(49-97) 115/76  1    10/07/18  1334 10/08/18  0600   Weight: 77.1 kg (170 lb) 77.7 kg (171 lb 3.2 oz)     Body mass index is 22.59 kg/m².    Intake/Output Summary (Last 24 hours) at 10/08/18 1006  Last data filed at 10/08/18 0916   Gross per 24 hour   Intake             4600 ml   Output              650 ml   Net             3950 ml     NPO Diet  ----------------------------------------------------------------------------------------------------------------------  Physical exam:  Constitutional:  Well-developed and well-nourished.     HENT:  Head:  Normocephalic and atraumatic.  Mouth:  Moist mucous membranes.     Eyes:  Conjunctivae and EOM are normal.  Pupils are equal, round, and reactive to light.   Neck:  Neck supple.  No JVD present.    Cardiovascular:  Regular rate and rhythm. S1+S2. No murmur, rubs or gallops.   Pulmonary/Chest: Clear to auscultation bilaterally.   Abdominal:  Soft. Non-tender. No viscera palpable.  Bowel sounds audible.   Musculoskeletal: No deformity or joint swelling.   Peripheral vascular: Bilateral dorsalis pedis palpable. No edema.   Neurological:  Alert and oriented to person, place, and time.  Cranial nerves grossly intact. Strength bilaterally symmetrical in upper and lower extremities.   Skin:  Skin is warm and dry. No rash noted. No pallor.   ----------------------------------------------------------------------------------------------------------------------  Tele:  Sinus rhythm  ----------------------------------------------------------------------------------------------------------------------        Results from last 7 days  Lab Units 10/08/18  0743 10/08/18  0326 10/07/18  1549 10/07/18  1440   WBC 10*3/mm3  --  4.14*  --  5.65   HEMOGLOBIN g/dL 6.7* 6.1*  --  4.6*   HEMATOCRIT % 21.7* 20.0*  --  15.3*   MCV fL  --  89.7  --  87.4   MCHC g/dL  --  30.5*  --  30.1*   PLATELETS 10*3/mm3  --  169  --  210   INR   --  1.29* 1.30*  --            Results from last 7 days  Lab Units 10/08/18  0743 10/08/18  0326 10/07/18  1723 10/07/18  1354   SODIUM mmol/L 133* 135 136 133*   POTASSIUM mmol/L  --  3.4*  --  2.8*   MAGNESIUM mg/dL  --  1.8  --  1.2*   CHLORIDE mmol/L  --  103  --  97*   CO2 mmol/L  --  26.8  --  23.5*   BUN mg/dL  --  8  --  9   CREATININE mg/dL  --  0.48  --  0.60   EGFR IF NONAFRICN AM mL/min/1.73  --  >150  --  150   CALCIUM mg/dL  --  6.5*  --  7.2*   GLUCOSE mg/dL  --  103  --  134*   ALBUMIN g/dL  --  2.90*  --  3.40*   BILIRUBIN mg/dL  --  1.2  --  0.8   ALK PHOS U/L  --  127  --  152*   AST (SGOT) U/L  --  81*  --  111*   ALT (SGPT) U/L  --  25  --  32   Estimated  Creatinine Clearance: 227.1 mL/min (by C-G formula based on SCr of 0.48 mg/dL).    No results found for: AMMONIA                    I have personally looked at the labs and they are summarized above.  ----------------------------------------------------------------------------------------------------------------------  Imaging Results (last 24 hours)     Procedure Component Value Units Date/Time    CT Abdomen Pelvis Without Contrast [[id number]] Collected:  10/08/18 0744     Updated:  10/08/18 0747    Narrative:       EXAM: CT ABDOMEN PELVIS WO CONTRAST-              TECHNIQUE: Multiple axial CT images were obtained from lung bases  through pubic symphysis WITHOUT administration of IV contrast.  Reformatted images in the coronal and/or sagittal plane(s) were  generated from the axial data set to facilitate diagnostic accuracy  and/or surgical planning.  Oral Contrast:NONE.     Radiation dose reduction techniques were utilized per ALARA protocol.  Automated exposure control was initiated through either or [employer] or  DoseRight software packages by  protocol.       DOSE: 751.92 mGy.cm     Clinical information  GI  bleed, please send to [phone]; D64.9-Anemia, unspecified;  K92.2-Gastrointestinal hemorrhage, unspecified; E87.6-Hypokalemia;  E83.42-Hypomagnesemia; F10.10-Alcohol abuse, uncomplicated      Comparison  NONE.     Findings  LOWER THORAX: Clear. No effusions.     ABDOMEN:        LIVER: MARKED AND DIFFUSE FATTY INFILTRATION OF LIVER IS NOTED.        GALLBLADDER: CHOLELITHIASIS.        PANCREAS: Unremarkable. No mass or ductal dilatation.        SPLEEN: Homogeneous. No splenomegaly.        ADRENALS: No mass.        KIDNEYS/URETERS: NONOBSTRUCTING LEFT KIDNEY STONE. NO HYDRONEPHROSIS.        GI TRACT: AIR-FLUID LEVELS THROUGHOUT SMALL AND LARGE BOWEL WITH  COLONIC DISTENTION COMPATIBLE WITH ENTEROCOLITIS. NO PNEUMATOSIS OR  BOWEL OBSTRUCTION IDENTIFIED.        PERITONEUM: VERY SMALL AMOUNTS OF ASCITES  ARE NOTED MAINLY IN THE  LEFT PERISPLENIC RECESS.        MESENTERY: Unremarkable.        LYMPH NODES: No lymphadenopathy.        VASCULATURE: No evidence of aneurysm.        ABDOMINAL WALL: No focal hernia or mass.           OTHER: None.     PELVIS:        BLADDER: DISTENTION OF URINARY BLADDER.        REPRODUCTIVE: Unremarkable as visualized.        APPENDIX: Nondistended. No surrounding inflammation.     BONES: No acute bony abnormality.       Impression:       Impression:  1. NONSPECIFIC ILEUS PROBABLY IN THE SETTING OF ENTEROCOLITIS. NO BOWEL  OBSTRUCTION.  2. MARKED DIFFUSE FATTY INFILTRATION OF LIVER.  3. CHOLELITHIASIS.  4. TRACE ASCITES.  5. NONOBSTRUCTING LEFT KIDNEY STONE.     This report was finalized on 10/8/2018 7:45 AM by Dr. Sherwin Schneider MD.           ----------------------------------------------------------------------------------------------------------------------  Assessment and Plan:    - GI bleeding  Patient denies any dark-colored stools.  He has been having frequent bowel movements due to colonoscopy prep.  Patient is scheduled to undergo EGD and colonoscopy today.  He is on Protonix drip and will continue.  FOBT +ve.     - Anemia due to GI bleeding  Hemoglobin has improved to 6 from 4.6 on admission.  Patient currently receiving last fusion with PRBC.  Workup show low iron and, low iron saturation and low ferritin, consistent with iron deficiency.  Will start iron replacement upon discharge.  Continue to monitor H&H and transfuse as needed to keep hemoglobin > 7.    - EtOH withdrawal  On CIWA protocol.  Continue lorazepam as needed per CIWA protocol.  Continue multivitamins and thiamine.    - Enterocolitis  CT scan concerning for enterocolitis.  Patient is asymptomatic, afebrile and WBC is normal.  Likely viral.  We will hold antibiotics at this time.    - Electrolytes and nutrition  Lab data show low potassium and phosphorous.  We'll replace per protocol and monitor electrolytes.    -  Coagulopathy  INR was slightly elevated.  Patient received a dose of vitamin K.    - Prophylaxis  DVT: SCDs.  No pharmacological prophylaxis due to GI bleeding.  GI: PPI gtt      The patient is high risk due to: Severe anemia, GI bleeding, alcohol withdrawal    I discussed the patients findings and my recommendations with patient, family and nursing staff.    Sydney Watson MD  10/08/18  10:06 AM

## 2018-10-09 LAB
ABO + RH BLD: NORMAL
ALBUMIN SERPL-MCNC: 2.5 G/DL (ref 3.5–5)
ALBUMIN/GLOB SERPL: 0.9 G/DL (ref 1.5–2.5)
ALP SERPL-CCNC: 126 U/L (ref 40–129)
ALT SERPL W P-5'-P-CCNC: 21 U/L (ref 10–44)
ANION GAP SERPL CALCULATED.3IONS-SCNC: 2.5 MMOL/L (ref 3.6–11.2)
AST SERPL-CCNC: 56 U/L (ref 10–34)
BASOPHILS # BLD AUTO: 0.02 10*3/MM3 (ref 0–0.3)
BASOPHILS NFR BLD AUTO: 0.5 % (ref 0–2)
BH BB BLOOD EXPIRATION DATE: NORMAL
BH BB BLOOD TYPE BARCODE: 6200
BH BB DISPENSE STATUS: NORMAL
BH BB PRODUCT CODE: NORMAL
BH BB UNIT NUMBER: NORMAL
BILIRUB SERPL-MCNC: 0.9 MG/DL (ref 0.2–1.8)
BUN BLD-MCNC: 5 MG/DL (ref 7–21)
BUN/CREAT SERPL: 9.3 (ref 7–25)
CALCIUM SPEC-SCNC: 6.7 MG/DL (ref 7.7–10)
CHLORIDE SERPL-SCNC: 105 MMOL/L (ref 99–112)
CO2 SERPL-SCNC: 27.5 MMOL/L (ref 24.3–31.9)
CREAT BLD-MCNC: 0.54 MG/DL (ref 0.43–1.29)
CROSSMATCH INTERPRETATION: NORMAL
DEPRECATED RDW RBC AUTO: 48 FL (ref 37–54)
EOSINOPHIL # BLD AUTO: 0.07 10*3/MM3 (ref 0–0.7)
EOSINOPHIL NFR BLD AUTO: 1.7 % (ref 0–5)
ERYTHROCYTE [DISTWIDTH] IN BLOOD BY AUTOMATED COUNT: 15 % (ref 11.5–14.5)
GFR SERPL CREATININE-BSD FRML MDRD: >150 ML/MIN/1.73
GLOBULIN UR ELPH-MCNC: 2.7 GM/DL
GLUCOSE BLD-MCNC: 113 MG/DL (ref 70–110)
HCT VFR BLD AUTO: 21.9 % (ref 42–52)
HCT VFR BLD AUTO: 22.8 % (ref 42–52)
HCT VFR BLD AUTO: 24.8 % (ref 42–52)
HGB BLD-MCNC: 6.8 G/DL (ref 14–18)
HGB BLD-MCNC: 6.8 G/DL (ref 14–18)
HGB BLD-MCNC: 7.8 G/DL (ref 14–18)
IMM GRANULOCYTES # BLD: 0 10*3/MM3 (ref 0–0.03)
IMM GRANULOCYTES NFR BLD: 0 % (ref 0–0.5)
LYMPHOCYTES # BLD AUTO: 0.95 10*3/MM3 (ref 1–3)
LYMPHOCYTES NFR BLD AUTO: 23.6 % (ref 21–51)
MCH RBC QN AUTO: 28.1 PG (ref 27–33)
MCHC RBC AUTO-ENTMCNC: 31.1 G/DL (ref 33–37)
MCV RBC AUTO: 90.5 FL (ref 80–94)
MONOCYTES # BLD AUTO: 0.41 10*3/MM3 (ref 0.1–0.9)
MONOCYTES NFR BLD AUTO: 10.2 % (ref 0–10)
NEUTROPHILS # BLD AUTO: 2.57 10*3/MM3 (ref 1.4–6.5)
NEUTROPHILS NFR BLD AUTO: 64 % (ref 30–70)
OSMOLALITY SERPL CALC.SUM OF ELEC: 268.2 MOSM/KG (ref 273–305)
PLATELET # BLD AUTO: 162 10*3/MM3 (ref 130–400)
PMV BLD AUTO: 10.8 FL (ref 6–10)
POTASSIUM BLD-SCNC: 3.9 MMOL/L (ref 3.5–5.3)
PROT SERPL-MCNC: 5.2 G/DL (ref 6–8)
RBC # BLD AUTO: 2.42 10*6/MM3 (ref 4.7–6.1)
SODIUM BLD-SCNC: 135 MMOL/L (ref 135–153)
SODIUM BLD-SCNC: 135 MMOL/L (ref 135–153)
SODIUM BLD-SCNC: 136 MMOL/L (ref 135–153)
SODIUM BLD-SCNC: 136 MMOL/L (ref 135–153)
UNIT  ABO: NORMAL
UNIT  RH: NORMAL
WBC NRBC COR # BLD: 4.02 10*3/MM3 (ref 4.5–12.5)

## 2018-10-09 PROCEDURE — 36430 TRANSFUSION BLD/BLD COMPNT: CPT

## 2018-10-09 PROCEDURE — 86900 BLOOD TYPING SEROLOGIC ABO: CPT

## 2018-10-09 PROCEDURE — 85014 HEMATOCRIT: CPT | Performed by: INTERNAL MEDICINE

## 2018-10-09 PROCEDURE — 94799 UNLISTED PULMONARY SVC/PX: CPT

## 2018-10-09 PROCEDURE — 25010000002 THIAMINE PER 100 MG: Performed by: NURSE PRACTITIONER

## 2018-10-09 PROCEDURE — P9016 RBC LEUKOCYTES REDUCED: HCPCS

## 2018-10-09 PROCEDURE — 99233 SBSQ HOSP IP/OBS HIGH 50: CPT | Performed by: INTERNAL MEDICINE

## 2018-10-09 PROCEDURE — 80053 COMPREHEN METABOLIC PANEL: CPT | Performed by: INTERNAL MEDICINE

## 2018-10-09 PROCEDURE — 85025 COMPLETE CBC W/AUTO DIFF WBC: CPT | Performed by: INTERNAL MEDICINE

## 2018-10-09 PROCEDURE — 84295 ASSAY OF SERUM SODIUM: CPT | Performed by: INTERNAL MEDICINE

## 2018-10-09 PROCEDURE — 25010000002 MORPHINE PER 10 MG: Performed by: INTERNAL MEDICINE

## 2018-10-09 PROCEDURE — 85018 HEMOGLOBIN: CPT | Performed by: INTERNAL MEDICINE

## 2018-10-09 PROCEDURE — 25010000002 LORAZEPAM PER 2 MG: Performed by: NURSE PRACTITIONER

## 2018-10-09 PROCEDURE — C1751 CATH, INF, PER/CENT/MIDLINE: HCPCS

## 2018-10-09 RX ORDER — LIDOCAINE HYDROCHLORIDE 10 MG/ML
5 INJECTION, SOLUTION INFILTRATION; PERINEURAL ONCE
Status: COMPLETED | OUTPATIENT
Start: 2018-10-09 | End: 2018-10-09

## 2018-10-09 RX ORDER — SODIUM CHLORIDE 9 MG/ML
INJECTION, SOLUTION INTRAVENOUS
Status: COMPLETED
Start: 2018-10-09 | End: 2018-10-09

## 2018-10-09 RX ORDER — SODIUM CHLORIDE 0.9 % (FLUSH) 0.9 %
10 SYRINGE (ML) INJECTION EVERY 12 HOURS SCHEDULED
Status: DISCONTINUED | OUTPATIENT
Start: 2018-10-09 | End: 2018-10-12 | Stop reason: HOSPADM

## 2018-10-09 RX ORDER — PANTOPRAZOLE SODIUM 40 MG/1
40 TABLET, DELAYED RELEASE ORAL
Status: DISCONTINUED | OUTPATIENT
Start: 2018-10-09 | End: 2018-10-12 | Stop reason: HOSPADM

## 2018-10-09 RX ORDER — MORPHINE SULFATE 2 MG/ML
1 INJECTION, SOLUTION INTRAMUSCULAR; INTRAVENOUS EVERY 4 HOURS PRN
Status: DISCONTINUED | OUTPATIENT
Start: 2018-10-09 | End: 2018-10-10

## 2018-10-09 RX ORDER — SODIUM CHLORIDE 0.9 % (FLUSH) 0.9 %
10 SYRINGE (ML) INJECTION AS NEEDED
Status: DISCONTINUED | OUTPATIENT
Start: 2018-10-09 | End: 2018-10-12 | Stop reason: HOSPADM

## 2018-10-09 RX ADMIN — Medication 10 ML: at 11:12

## 2018-10-09 RX ADMIN — PANTOPRAZOLE SODIUM 8 MG/HR: 40 INJECTION, POWDER, FOR SOLUTION INTRAVENOUS at 01:00

## 2018-10-09 RX ADMIN — SODIUM CHLORIDE 50 ML: 9 INJECTION, SOLUTION INTRAVENOUS at 11:13

## 2018-10-09 RX ADMIN — LORAZEPAM 1 MG: 2 INJECTION INTRAMUSCULAR; INTRAVENOUS at 13:17

## 2018-10-09 RX ADMIN — PANTOPRAZOLE SODIUM 40 MG: 40 TABLET, DELAYED RELEASE ORAL at 17:33

## 2018-10-09 RX ADMIN — SODIUM CHLORIDE, POTASSIUM CHLORIDE, SODIUM LACTATE AND CALCIUM CHLORIDE 100 ML/HR: 600; 310; 30; 20 INJECTION, SOLUTION INTRAVENOUS at 06:15

## 2018-10-09 RX ADMIN — LORAZEPAM 1 MG: 1 TABLET ORAL at 16:21

## 2018-10-09 RX ADMIN — PANTOPRAZOLE SODIUM 8 MG/HR: 40 INJECTION, POWDER, FOR SOLUTION INTRAVENOUS at 11:21

## 2018-10-09 RX ADMIN — LIDOCAINE HYDROCHLORIDE 0.5 ML: 10 INJECTION, SOLUTION INFILTRATION; PERINEURAL at 09:47

## 2018-10-09 RX ADMIN — Medication 3 ML: at 20:35

## 2018-10-09 RX ADMIN — Medication 3 ML: at 11:12

## 2018-10-09 RX ADMIN — THIAMINE HYDROCHLORIDE 100 ML/HR: 100 INJECTION, SOLUTION INTRAMUSCULAR; INTRAVENOUS at 12:30

## 2018-10-09 RX ADMIN — PANTOPRAZOLE SODIUM 8 MG/HR: 40 INJECTION, POWDER, FOR SOLUTION INTRAVENOUS at 06:14

## 2018-10-09 RX ADMIN — Medication 10 ML: at 20:36

## 2018-10-09 RX ADMIN — MORPHINE SULFATE 1 MG: 2 INJECTION, SOLUTION INTRAMUSCULAR; INTRAVENOUS at 08:20

## 2018-10-09 NOTE — PROGRESS NOTES
Brief Hospital Medicine Note:    Was contacted by CL Garsia regarding repeat H/H after transfusion. EGD/Colonoscopy procedure note reviewed. Per nurse report, no signs/symptoms of active bleeding. He is hemodynamically stable at this time. Will cut back on the rate of IV infusion (LR) and hold on further transfusion for now. Will repeat H/H in the am and plan to transfuse if hemoglobin remains < 7.0.

## 2018-10-09 NOTE — PROGRESS NOTES
[facility] HOSPITALIST PROGRESS NOTE     Patient Identification:  Name:  Tavo Murrieta  Age:  38 y.o.  Sex:  male  :  1980  MRN:  [id number]  Visit Number:  [id number]  Primary Care Provider:  Duc Pena MD    Length of stay:  2    Chief complaint:  38 y.o. old male admitted with Alcohol Problem          Subjective:    No acute issues overnight.  Patient underwent EGD and colonoscopy that showed old blood in the stomach but no evidence of bleeding and possible gastropathy.  Patient denies any new complaints today.  He denies any nausea, vomiting or melenic stools.  Patient has been tolerating by mouth diet.  He is complaining of soreness and cramps in his leg muscles and according to the nursing staff patient has been asking for morphine and is wanting it increased.         Current Hospital Meds:    enoxaparin 40 mg Subcutaneous Daily   IV Fluids 1000 mL + additives 100 mL/hr Intravenous Daily   potassium & sodium phosphates 2 packet Oral Once   sodium chloride 10 mL Intravenous Q12H   sodium chloride 3 mL Intravenous Q12H       lactated ringers 100 mL/hr Last Rate: 100 mL/hr (10/09/18 0615)   pantoprazole 8 mg/hr Last Rate: 8 mg/hr (10/09/18 1121)     ----------------------------------------------------------------------------------------------------------------------  Vital Signs:  Temp:  [97.9 °F (36.6 °C)-98.5 °F (36.9 °C)] 98.3 °F (36.8 °C)  Heart Rate:  [] 114  Resp:  [8] 11  BP: ()/() 147/93  1    10/07/18  1334 10/08/18  0600 10/09/18  0500   Weight: 77.1 kg (170 lb) 77.7 kg (171 lb 3.2 oz) 74.8 kg (164 lb 14.5 oz)     Body mass index is 21.76 kg/m².    Intake/Output Summary (Last 24 hours) at 10/09/18 1301  Last data filed at 10/09/18 1230   Gross per 24 hour   Intake          3404.66 ml   Output              800 ml   Net          2604.66 ml     Diet  Regular  ----------------------------------------------------------------------------------------------------------------------  Physical exam:  Constitutional:  Well-developed and well-nourished.     HENT:  Head:  Normocephalic and atraumatic.  Mouth:  Moist mucous membranes.    Eyes:  Conjunctivae and EOM are normal.  Pupils are equal, round, and reactive to light.   Neck:  Neck supple.  No JVD present.    Cardiovascular:  Regular rate and rhythm. S1+S2. No murmur, rubs or gallops.   Pulmonary/Chest: Clear to auscultation bilaterally.   Abdominal:  Soft. Non-tender. No viscera palpable.  Bowel sounds audible.   Musculoskeletal: No deformity or joint swelling.   Peripheral vascular: Bilateral dorsalis pedis palpable. No edema.   Neurological:  Alert and oriented to person, place, and time.  Cranial nerves grossly intact. Strength bilaterally symmetrical in upper and lower extremities.   Skin:  Skin is warm and dry. No rash noted. No pallor.   ----------------------------------------------------------------------------------------------------------------------  Tele:  Sinus rhythm  ----------------------------------------------------------------------------------------------------------------------        Results from last 7 days  Lab Units 10/09/18  0616 10/09/18  0042 10/08/18  2005  10/08/18  0326 10/07/18  1549 10/07/18  1440   WBC 10*3/mm3  --  4.02*  --   --  4.14*  --  5.65   HEMOGLOBIN g/dL 6.8* 6.8* 6.7*  < > 6.1*  --  4.6*   HEMATOCRIT % 22.8* 21.9* 21.8*  < > 20.0*  --  15.3*   MCV fL  --  90.5  --   --  89.7  --  87.4   MCHC g/dL  --  31.1*  --   --  30.5*  --  30.1*   PLATELETS 10*3/mm3  --  162  --   --  169  --  210   INR   --   --   --   --  1.29* 1.30*  --    < > = values in this interval not displayed.        Results from last 7 days  Lab Units 10/09/18  1133 10/09/18  0616 10/09/18  0042  10/08/18  0326  10/07/18  1354   SODIUM mmol/L 135 136 135  < > 135  < > 133*   POTASSIUM mmol/L  --   --  3.9   --  3.4*  --  2.8*   MAGNESIUM mg/dL  --   --   --   --  1.8  --  1.2*   CHLORIDE mmol/L  --   --  105  --  103  --  97*   CO2 mmol/L  --   --  27.5  --  26.8  --  23.5*   BUN mg/dL  --   --  5*  --  8  --  9   CREATININE mg/dL  --   --  0.54  --  0.48  --  0.60   EGFR IF NONAFRICN AM mL/min/1.73  --   --  >150  --  >150  --  150   CALCIUM mg/dL  --   --  6.7*  --  6.5*  --  7.2*   GLUCOSE mg/dL  --   --  113*  --  103  --  134*   ALBUMIN g/dL  --   --  2.50*  --  2.90*  --  3.40*   BILIRUBIN mg/dL  --   --  0.9  --  1.2  --  0.8   ALK PHOS U/L  --   --  126  --  127  --  152*   AST (SGOT) U/L  --   --  56*  --  81*  --  111*   ALT (SGPT) U/L  --   --  21  --  25  --  32   < > = values in this interval not displayed.Estimated Creatinine Clearance: 194.3 mL/min (by C-G formula based on SCr of 0.54 mg/dL).    No results found for: AMMONIA                    I have personally looked at the labs and they are summarized above.  ----------------------------------------------------------------------------------------------------------------------  Imaging Results (last 24 hours)     ** No results found for the last 24 hours. **        ----------------------------------------------------------------------------------------------------------------------  Assessment and Plan:    - GI bleeding  No further episodes of melenic stools.  Status post EGD and colonoscopy that showed gastropathy, old blood in the stomach and no evidence of active bleeding.  Patient is tolerating by mouth diet and will discontinue her Protonix drip and start him on scheduled by mouth Protonix.     - Anemia due to GI bleeding  Patient is receiving blood transfusion.  His hemoglobin is gradually improving and will recheck his hemoglobin after current transfusion.  We'll aim to get hemoglobin  > 7.  Workup show low iron and, low iron saturation and low ferritin, consistent with iron deficiency.  Will start iron replacement upon discharge.  Continue to  monitor H&H and transfuse as needed to keep hemoglobin > 7.    - EtOH withdrawal  On CIWA protocol.  Continue lorazepam as needed per CIWA protocol but has not required any lorazepam since yesterday.  Continue multivitamins and thiamine.    - Enterocolitis  CT scan concerning for enterocolitis.  Patient is asymptomatic, afebrile and WBC is normal.  Likely viral.  Continue to hold antibiotics.    - Coagulopathy  INR was slightly elevated.  Patient received a dose of vitamin K. No evidence of bleeding.     - Leg pain  Patient likely having muscle cramps due to anemia.  I explained to the patient that his leg cramps do not need morphine. I will decrease his morphine. Discussed with the patient who understood and was agreeable.   Nursing staff later called me and told me that patient has not been asking for Ativan.  She explained to the patient that he is getting ativan for withdrawal symptoms only.     - EtOH abuse/dependence  Patient was encouraged to quit drinking alcohol and counseled on the dangers of alcohol use.  Patient states that he was given a list of rehabilitation places by  and is planning to go to rehabilitation upon discharge.    - Prophylaxis  DVT: SCDs.  No pharmacological prophylaxis due to GI bleeding.  GI: PPI    - Disposition  We'll continue to monitor him closely in ICU.  We will continue to monitor his H&H closely and if H&H remains stable, patient may be discharged  in 1-2 days.        The patient is high risk due to: Severe anemia, GI bleeding, alcohol withdrawal    I discussed the patients findings and my recommendations with patient, family and nursing staff.    Sydney Watson MD  10/09/18  1:01 PM

## 2018-10-09 NOTE — PLAN OF CARE
Problem: Alcohol Withdrawal Acute, Risk/Actual (Adult)  Goal: Signs and Symptoms of Listed Potential Problems Will be Absent, Minimized or Managed (Alcohol Withdrawal Acute, Risk/Actual)  Outcome: Ongoing (interventions implemented as appropriate)      Problem: Anemia (Adult)  Goal: Identify Related Risk Factors and Signs and Symptoms  Outcome: Ongoing (interventions implemented as appropriate)    Goal: Symptom Improvement  Outcome: Ongoing (interventions implemented as appropriate)      Problem: Patient Care Overview  Goal: Plan of Care Review  Outcome: Ongoing (interventions implemented as appropriate)    Goal: Individualization and Mutuality  Outcome: Ongoing (interventions implemented as appropriate)    Goal: Discharge Needs Assessment  Outcome: Ongoing (interventions implemented as appropriate)

## 2018-10-09 NOTE — PROGRESS NOTES
Consider advancing the IV infusion of protonix to a scheduled IV dose or PO when clinically allowable.

## 2018-10-09 NOTE — PLAN OF CARE
Problem: Alcohol Withdrawal Acute, Risk/Actual (Adult)  Goal: Signs and Symptoms of Listed Potential Problems Will be Absent, Minimized or Managed (Alcohol Withdrawal Acute, Risk/Actual)  Outcome: Ongoing (interventions implemented as appropriate)   10/09/18 0221   Goal/Outcome Evaluation   Problems Assessed (Alcohol Withdrawal Syndrome) all   Problems Present (Alcohol W/D Syndrome) effects of SABI (alcohol withdrawal syndrome)       Problem: Anemia (Adult)  Goal: Identify Related Risk Factors and Signs and Symptoms  Outcome: Ongoing (interventions implemented as appropriate)   10/09/18 0221   Anemia (Adult)   Related Risk Factors (Anemia) bleeding   Signs and Symptoms (Anemia) pallor     Goal: Symptom Improvement  Outcome: Ongoing (interventions implemented as appropriate)   10/09/18 0221   Anemia (Adult)   Symptom Improvement making progress toward outcome       Problem: Patient Care Overview  Goal: Plan of Care Review  Outcome: Ongoing (interventions implemented as appropriate)   10/09/18 0221   Coping/Psychosocial   Plan of Care Reviewed With patient;family   Plan of Care Review   Progress improving   OTHER   Outcome Summary Pt vitals remain WNL; Withdraw improving H&H remains low      Goal: Discharge Needs Assessment  Outcome: Ongoing (interventions implemented as appropriate)   10/08/18 1326   Discharge Needs Assessment   Concerns to be Addressed substance/tobacco abuse/use   Discharge Facility/Level of Care Needs substance abuse facility   Current Discharge Risk substance use/abuse     Goal: Interprofessional Rounds/Family Conf  Outcome: Ongoing (interventions implemented as appropriate)   10/09/18 0221   Interdisciplinary Rounds/Family Conf   Participants family;nursing;patient;pharmacy;physician

## 2018-10-10 LAB
ABO + RH BLD: NORMAL
ALBUMIN SERPL-MCNC: 2.5 G/DL (ref 3.5–5)
ALBUMIN/GLOB SERPL: 0.9 G/DL (ref 1.5–2.5)
ALP SERPL-CCNC: 117 U/L (ref 40–129)
ALT SERPL W P-5'-P-CCNC: 14 U/L (ref 10–44)
ANION GAP SERPL CALCULATED.3IONS-SCNC: 6.6 MMOL/L (ref 3.6–11.2)
AST SERPL-CCNC: 38 U/L (ref 10–34)
BASOPHILS # BLD AUTO: 0.02 10*3/MM3 (ref 0–0.3)
BASOPHILS NFR BLD AUTO: 0.5 % (ref 0–2)
BH BB BLOOD EXPIRATION DATE: NORMAL
BH BB BLOOD TYPE BARCODE: 6200
BH BB DISPENSE STATUS: NORMAL
BH BB PRODUCT CODE: NORMAL
BH BB UNIT NUMBER: NORMAL
BILIRUB SERPL-MCNC: 0.7 MG/DL (ref 0.2–1.8)
BUN BLD-MCNC: <5 MG/DL (ref 7–21)
BUN/CREAT SERPL: ABNORMAL (ref 7–25)
CALCIUM SPEC-SCNC: 7.3 MG/DL (ref 7.7–10)
CHLORIDE SERPL-SCNC: 107 MMOL/L (ref 99–112)
CO2 SERPL-SCNC: 22.4 MMOL/L (ref 24.3–31.9)
CREAT BLD-MCNC: 0.44 MG/DL (ref 0.43–1.29)
CROSSMATCH INTERPRETATION: NORMAL
DEPRECATED RDW RBC AUTO: 45.8 FL (ref 37–54)
EOSINOPHIL # BLD AUTO: 0.07 10*3/MM3 (ref 0–0.7)
EOSINOPHIL NFR BLD AUTO: 1.6 % (ref 0–5)
ERYTHROCYTE [DISTWIDTH] IN BLOOD BY AUTOMATED COUNT: 14.9 % (ref 11.5–14.5)
GFR SERPL CREATININE-BSD FRML MDRD: >150 ML/MIN/1.73
GLOBULIN UR ELPH-MCNC: 2.8 GM/DL
GLUCOSE BLD-MCNC: 95 MG/DL (ref 70–110)
HCT VFR BLD AUTO: 23.4 % (ref 42–52)
HCT VFR BLD AUTO: 26.2 % (ref 42–52)
HGB BLD-MCNC: 7.3 G/DL (ref 14–18)
HGB BLD-MCNC: 8.1 G/DL (ref 14–18)
IMM GRANULOCYTES # BLD: 0.01 10*3/MM3 (ref 0–0.03)
IMM GRANULOCYTES NFR BLD: 0.2 % (ref 0–0.5)
LAB AP CASE REPORT: NORMAL
LYMPHOCYTES # BLD AUTO: 1.39 10*3/MM3 (ref 1–3)
LYMPHOCYTES NFR BLD AUTO: 31.3 % (ref 21–51)
MAGNESIUM SERPL-MCNC: 1.5 MG/DL (ref 1.7–2.6)
MCH RBC QN AUTO: 27.3 PG (ref 27–33)
MCHC RBC AUTO-ENTMCNC: 31.2 G/DL (ref 33–37)
MCV RBC AUTO: 87.6 FL (ref 80–94)
MONOCYTES # BLD AUTO: 0.55 10*3/MM3 (ref 0.1–0.9)
MONOCYTES NFR BLD AUTO: 12.4 % (ref 0–10)
NEUTROPHILS # BLD AUTO: 2.4 10*3/MM3 (ref 1.4–6.5)
NEUTROPHILS NFR BLD AUTO: 54 % (ref 30–70)
OSMOLALITY SERPL CALC.SUM OF ELEC: NORMAL MOSM/KG (ref 273–305)
PATH REPORT.FINAL DX SPEC: NORMAL
PHOSPHATE SERPL-MCNC: 1.9 MG/DL (ref 2.7–4.5)
PHOSPHATE SERPL-MCNC: 2.3 MG/DL (ref 2.7–4.5)
PLATELET # BLD AUTO: 172 10*3/MM3 (ref 130–400)
PMV BLD AUTO: 10.9 FL (ref 6–10)
POTASSIUM BLD-SCNC: 3.8 MMOL/L (ref 3.5–5.3)
PROT SERPL-MCNC: 5.3 G/DL (ref 6–8)
RBC # BLD AUTO: 2.67 10*6/MM3 (ref 4.7–6.1)
SODIUM BLD-SCNC: 133 MMOL/L (ref 135–153)
SODIUM BLD-SCNC: 134 MMOL/L (ref 135–153)
SODIUM BLD-SCNC: 135 MMOL/L (ref 135–153)
SODIUM BLD-SCNC: 136 MMOL/L (ref 135–153)
UNIT  ABO: NORMAL
UNIT  RH: NORMAL
WBC NRBC COR # BLD: 4.44 10*3/MM3 (ref 4.5–12.5)

## 2018-10-10 PROCEDURE — 85014 HEMATOCRIT: CPT | Performed by: INTERNAL MEDICINE

## 2018-10-10 PROCEDURE — 80053 COMPREHEN METABOLIC PANEL: CPT | Performed by: INTERNAL MEDICINE

## 2018-10-10 PROCEDURE — 25010000002 MAGNESIUM SULFATE 2 GM/50ML SOLUTION: Performed by: INTERNAL MEDICINE

## 2018-10-10 PROCEDURE — 99232 SBSQ HOSP IP/OBS MODERATE 35: CPT | Performed by: INTERNAL MEDICINE

## 2018-10-10 PROCEDURE — 84100 ASSAY OF PHOSPHORUS: CPT | Performed by: INTERNAL MEDICINE

## 2018-10-10 PROCEDURE — 93005 ELECTROCARDIOGRAM TRACING: CPT | Performed by: INTERNAL MEDICINE

## 2018-10-10 PROCEDURE — 84295 ASSAY OF SERUM SODIUM: CPT | Performed by: INTERNAL MEDICINE

## 2018-10-10 PROCEDURE — 83735 ASSAY OF MAGNESIUM: CPT | Performed by: INTERNAL MEDICINE

## 2018-10-10 PROCEDURE — 85018 HEMOGLOBIN: CPT | Performed by: INTERNAL MEDICINE

## 2018-10-10 PROCEDURE — 93010 ELECTROCARDIOGRAM REPORT: CPT | Performed by: INTERNAL MEDICINE

## 2018-10-10 PROCEDURE — 85025 COMPLETE CBC W/AUTO DIFF WBC: CPT | Performed by: INTERNAL MEDICINE

## 2018-10-10 PROCEDURE — 25010000002 THIAMINE PER 100 MG: Performed by: NURSE PRACTITIONER

## 2018-10-10 RX ORDER — CHLORDIAZEPOXIDE HYDROCHLORIDE 10 MG/1
10 CAPSULE, GELATIN COATED ORAL ONCE
Status: DISCONTINUED | OUTPATIENT
Start: 2018-10-15 | End: 2018-10-12 | Stop reason: HOSPADM

## 2018-10-10 RX ORDER — MORPHINE SULFATE 2 MG/ML
1 INJECTION, SOLUTION INTRAMUSCULAR; INTRAVENOUS EVERY 8 HOURS PRN
Status: DISCONTINUED | OUTPATIENT
Start: 2018-10-10 | End: 2018-10-11

## 2018-10-10 RX ORDER — CHLORDIAZEPOXIDE HYDROCHLORIDE 10 MG/1
10 CAPSULE, GELATIN COATED ORAL 3 TIMES DAILY
Status: DISCONTINUED | OUTPATIENT
Start: 2018-10-13 | End: 2018-10-12 | Stop reason: HOSPADM

## 2018-10-10 RX ORDER — MAGNESIUM SULFATE HEPTAHYDRATE 40 MG/ML
2 INJECTION, SOLUTION INTRAVENOUS
Status: COMPLETED | OUTPATIENT
Start: 2018-10-10 | End: 2018-10-10

## 2018-10-10 RX ORDER — CHLORDIAZEPOXIDE HYDROCHLORIDE 25 MG/1
25 CAPSULE, GELATIN COATED ORAL 3 TIMES DAILY
Status: DISCONTINUED | OUTPATIENT
Start: 2018-10-12 | End: 2018-10-12 | Stop reason: HOSPADM

## 2018-10-10 RX ORDER — CHLORDIAZEPOXIDE HYDROCHLORIDE 10 MG/1
10 CAPSULE, GELATIN COATED ORAL ONCE
Status: DISCONTINUED | OUTPATIENT
Start: 2018-10-14 | End: 2018-10-12 | Stop reason: HOSPADM

## 2018-10-10 RX ORDER — CHLORDIAZEPOXIDE HYDROCHLORIDE 5 MG/1
5 CAPSULE, GELATIN COATED ORAL ONCE
Status: DISCONTINUED | OUTPATIENT
Start: 2018-10-14 | End: 2018-10-12 | Stop reason: HOSPADM

## 2018-10-10 RX ADMIN — POTASSIUM & SODIUM PHOSPHATES POWDER PACK 280-160-250 MG 2 PACKET: 280-160-250 PACK at 02:43

## 2018-10-10 RX ADMIN — MAGNESIUM SULFATE IN WATER 2 G: 40 INJECTION, SOLUTION INTRAVENOUS at 04:27

## 2018-10-10 RX ADMIN — MAGNESIUM SULFATE IN WATER 2 G: 40 INJECTION, SOLUTION INTRAVENOUS at 06:00

## 2018-10-10 RX ADMIN — Medication 3 ML: at 20:01

## 2018-10-10 RX ADMIN — PANTOPRAZOLE SODIUM 40 MG: 40 TABLET, DELAYED RELEASE ORAL at 06:00

## 2018-10-10 RX ADMIN — POTASSIUM & SODIUM PHOSPHATES POWDER PACK 280-160-250 MG 2 PACKET: 280-160-250 PACK at 20:08

## 2018-10-10 RX ADMIN — Medication 3 ML: at 08:14

## 2018-10-10 RX ADMIN — PANTOPRAZOLE SODIUM 40 MG: 40 TABLET, DELAYED RELEASE ORAL at 17:56

## 2018-10-10 RX ADMIN — MAGNESIUM SULFATE IN WATER 2 G: 40 INJECTION, SOLUTION INTRAVENOUS at 02:44

## 2018-10-10 RX ADMIN — Medication 10 ML: at 08:14

## 2018-10-10 RX ADMIN — THIAMINE HYDROCHLORIDE 100 ML/HR: 100 INJECTION, SOLUTION INTRAMUSCULAR; INTRAVENOUS at 08:54

## 2018-10-10 RX ADMIN — Medication 10 ML: at 20:02

## 2018-10-10 NOTE — NURSING NOTE
Patient asking RN for Ativan.  RN did CiWA (on alcohol withdrawal protocol) - pt score 4.  RN explained that pt did not meet criteria for the ativan per protocol but that nursing staff would continue to monitor his status and administer medication if need indicated.  Patient verbalized understanding.

## 2018-10-10 NOTE — PLAN OF CARE
Problem: Alcohol Withdrawal Acute, Risk/Actual (Adult)  Goal: Signs and Symptoms of Listed Potential Problems Will be Absent, Minimized or Managed (Alcohol Withdrawal Acute, Risk/Actual)  Outcome: Ongoing (interventions implemented as appropriate)   10/10/18 0220   Goal/Outcome Evaluation   Problems Assessed (Alcohol Withdrawal Syndrome) all   Problems Present (Alcohol W/D Syndrome) none       Problem: Anemia (Adult)  Goal: Identify Related Risk Factors and Signs and Symptoms  Outcome: Ongoing (interventions implemented as appropriate)   10/10/18 0220   Anemia (Adult)   Related Risk Factors (Anemia) bleeding   Signs and Symptoms (Anemia) pallor     Goal: Symptom Improvement  Outcome: Ongoing (interventions implemented as appropriate)   10/10/18 0220   Anemia (Adult)   Symptom Improvement making progress toward outcome       Problem: Patient Care Overview  Goal: Plan of Care Review  Outcome: Ongoing (interventions implemented as appropriate)   10/10/18 0220   Coping/Psychosocial   Plan of Care Reviewed With patient   Plan of Care Review   Progress improving   OTHER   Outcome Summary Pt H&H have remained stable. Vitals WNL      Goal: Discharge Needs Assessment  Outcome: Ongoing (interventions implemented as appropriate)   10/08/18 1326   Discharge Needs Assessment   Discharge Facility/Level of Care Needs substance abuse facility   Current Discharge Risk substance use/abuse     Goal: Interprofessional Rounds/Family Conf  Outcome: Ongoing (interventions implemented as appropriate)   10/10/18 0220   Interdisciplinary Rounds/Family Conf   Participants patient;physician;nursing

## 2018-10-10 NOTE — PROGRESS NOTES
[facility] HOSPITALIST PROGRESS NOTE     Patient Identification:  Name:  Tavo Murrieta  Age:  38 y.o.  Sex:  male  :  1980  MRN:  [id number]  Visit Number:  [id number]  Primary Care Provider:  Duc Pena MD    Length of stay:  3    Chief complaint:  38 y.o. old male admitted with Alcohol Problem          Subjective:    No acute issues overnight.  Patient has not required any Ativan since yesterday.  He also has not needed any morphine since yesterday.  He denies any complaints.  Patient had a dark colored BM yesterday.  No other evidence of bleeding.  Nursing staff reports that patient's heart rate has been going up to 120s whenever he ambulates in the room.       Current Hospital Meds:    enoxaparin 40 mg Subcutaneous Daily   pantoprazole 40 mg Oral BID AC   potassium & sodium phosphates 2 packet Oral Once   sodium chloride 10 mL Intravenous Q12H   sodium chloride 3 mL Intravenous Q12H        ----------------------------------------------------------------------------------------------------------------------  Vital Signs:  Temp:  [98.4 °F (36.9 °C)-99 °F (37.2 °C)] 98.5 °F (36.9 °C)  Heart Rate:  [] 112  Resp:  [11-31] 11  BP: (114-152)/() 131/81  1    10/08/18  0600 10/09/18  0500 10/10/18  0500   Weight: 77.7 kg (171 lb 3.2 oz) 74.8 kg (164 lb 14.5 oz) 78 kg (172 lb)     Body mass index is 22.69 kg/m².    Intake/Output Summary (Last 24 hours) at 10/10/18 1250  Last data filed at 10/10/18 1000   Gross per 24 hour   Intake          2047.92 ml   Output             2200 ml   Net          -152.08 ml     Diet Regular  ----------------------------------------------------------------------------------------------------------------------  Physical exam:  Constitutional:  Well-developed and well-nourished.     HENT:  Head:  Normocephalic and atraumatic.  Mouth:  Moist mucous membranes.    Eyes:  Conjunctivae and EOM are normal.  Pupils are equal, round, and reactive to  light.   Neck:  Neck supple.  No JVD present.    Cardiovascular:  Regular rate and rhythm. S1+S2. No murmur, rubs or gallops.   Pulmonary/Chest: Clear to auscultation bilaterally.   Abdominal:  Soft. Non-tender. No viscera palpable.  Bowel sounds audible.   Musculoskeletal: No deformity or joint swelling.   Peripheral vascular: Bilateral dorsalis pedis palpable. No edema.   Neurological:  Alert and oriented to person, place, and time.  Cranial nerves grossly intact. Strength bilaterally symmetrical in upper and lower extremities.   Skin:  Skin is warm and dry. No rash noted. No pallor.   ----------------------------------------------------------------------------------------------------------------------  Tele:  Sinus tachycardia  ----------------------------------------------------------------------------------------------------------------------        Results from last 7 days  Lab Units 10/10/18  0023 10/09/18  1705 10/09/18  0616 10/09/18  0042  10/08/18  0326 10/07/18  1549   WBC 10*3/mm3 4.44*  --   --  4.02*  --  4.14*  --    HEMOGLOBIN g/dL 7.3* 7.8* 6.8* 6.8*  < > 6.1*  --    HEMATOCRIT % 23.4* 24.8* 22.8* 21.9*  < > 20.0*  --    MCV fL 87.6  --   --  90.5  --  89.7  --    MCHC g/dL 31.2*  --   --  31.1*  --  30.5*  --    PLATELETS 10*3/mm3 172  --   --  162  --  169  --    INR   --   --   --   --   --  1.29* 1.30*   < > = values in this interval not displayed.        Results from last 7 days  Lab Units 10/10/18  0552 10/10/18  0023 10/09/18  1705  10/09/18  0042  10/08/18  0326  10/07/18  1354   SODIUM mmol/L 135 136 136  < > 135  < > 135  < > 133*   POTASSIUM mmol/L  --  3.8  --   --  3.9  --  3.4*  --  2.8*   MAGNESIUM mg/dL  --  1.5*  --   --   --   --  1.8  --  1.2*   CHLORIDE mmol/L  --  107  --   --  105  --  103  --  97*   CO2 mmol/L  --  22.4*  --   --  27.5  --  26.8  --  23.5*   BUN mg/dL  --  <5*  --   --  5*  --  8  --  9   CREATININE mg/dL  --  0.44  --   --  0.54  --  0.48  --  0.60   EGFR IF  NONAFRICN AM mL/min/1.73  --  >150  --   --  >150  --  >150  --  150   CALCIUM mg/dL  --  7.3*  --   --  6.7*  --  6.5*  --  7.2*   GLUCOSE mg/dL  --  95  --   --  113*  --  103  --  134*   ALBUMIN g/dL  --  2.50*  --   --  2.50*  --  2.90*  --  3.40*   BILIRUBIN mg/dL  --  0.7  --   --  0.9  --  1.2  --  0.8   ALK PHOS U/L  --  117  --   --  126  --  127  --  152*   AST (SGOT) U/L  --  38*  --   --  56*  --  81*  --  111*   ALT (SGPT) U/L  --  14  --   --  21  --  25  --  32   < > = values in this interval not displayed.Estimated Creatinine Clearance: 248.7 mL/min (by C-G formula based on SCr of 0.44 mg/dL).    No results found for: AMMONIA                    I have personally looked at the labs and they are summarized above.  ----------------------------------------------------------------------------------------------------------------------  Imaging Results (last 24 hours)     ** No results found for the last 24 hours. **        ----------------------------------------------------------------------------------------------------------------------  Assessment and Plan:    - GI bleeding  No further episodes of melenic stools.  Status post EGD and colonoscopy that showed gastropathy, old blood in the stomach and no evidence of active bleeding.    Continue PPI.  Patient tolerating  PO diet and will continue.      - Anemia due to GI bleeding  S/p transfusion with 4 units of PRBC.  Hemoglobin improved to 7.8 yesterday but is down to 7.3 again today, was 4.6 on admission.    Workup show low iron and, low iron saturation and low ferritin, consistent with iron deficiency.  Will start iron replacement upon discharge.  Continue to monitor H&H q12h and transfuse as needed to keep hemoglobin > 7.    - EtOH withdrawal  On CIWA protocol.  According to the nursing staff, patient has been asking for lorazepam but did not have any symptoms and signs concerning for alcohol withdrawal.  Nursing staff instructed to give lorazepam  according to [facility] protocol. Will add scheduled librium taper.     - Sinus tachycardia  Patient's heart rate has gone up today especially on ambulation.  We will recheck his hemoglobin and 2-D echocardiogram.    - Enterocolitis  CT scan on admission was concerning for enterocolitis.  Patient is asymptomatic, afebrile and WBC is normal.  No need for abx at this time.     - Coagulopathy  INR was slightly elevated.  Patient received a dose of vitamin K. No evidence of bleeding.     - Leg pain  Patient likely having muscle cramps due to anemia.  I explained to the patient that his leg cramps do not need morphine. I will decrease his morphine. Discussed with the patient who understood and was agreeable.   Nursing staff later called me and told me that patient has not been asking for Ativan.  She explained to the patient that he is getting ativan for withdrawal symptoms only.     - Alcoholic hepatitis  AST was mildly elevated on admission and is improved now.  CT scan showed diffuse fatty infiltration of the liver.  Patient is advised to follow-up with hepatology and advised to stay away from alcohol.    - EtOH abuse/dependence  Patient was encouraged to quit drinking alcohol and counseled on the dangers of alcohol use.  Ration is planning to go to rehabilitation upon discharge.    - Prophylaxis  DVT: SCDs.  No pharmacological prophylaxis due to GI bleeding.  GI: PPI    - Disposition  We'll continue to monitor him closely in ICU.  We will continue to monitor his H&H closely and if H&H remains stable, patient may be discharged  in 1-2 days.        The patient is high risk due to: Severe anemia, GI bleeding, alcohol withdrawal    I discussed the patients findings and my recommendations with patient, family and nursing staff.    Sydney Watson MD  10/10/18  12:50 PM

## 2018-10-10 NOTE — PROGRESS NOTES
Discharge Planning Assessment  ELVIA Giang     Patient Name: Tavo Murrieta  MRN: [id number]  Today's Date: 10/10/2018    Admit Date: 10/7/2018      Discharge Plan     Row Name 10/10/18 1043       Plan    Plan Pt livesa t home with his fiance and plans to return home at discharge. Pt does not have home health or DME. Pt has good family support. Pt has been provided a list a resources. SS will follow and assist as needed.     Patient/Family in Agreement with Plan yes        Expected Discharge Date and Time     Expected Discharge Date Expected Discharge Time    Oct 10, 2018           Yoly Ivan

## 2018-10-11 ENCOUNTER — APPOINTMENT (OUTPATIENT)
Dept: CARDIOLOGY | Facility: HOSPITAL | Age: 38
End: 2018-10-11
Attending: INTERNAL MEDICINE

## 2018-10-11 LAB
ALBUMIN SERPL-MCNC: 2.8 G/DL (ref 3.5–5)
ALBUMIN/GLOB SERPL: 1 G/DL (ref 1.5–2.5)
ALP SERPL-CCNC: 110 U/L (ref 40–129)
ALT SERPL W P-5'-P-CCNC: 14 U/L (ref 10–44)
ANION GAP SERPL CALCULATED.3IONS-SCNC: 6.7 MMOL/L (ref 3.6–11.2)
AST SERPL-CCNC: 40 U/L (ref 10–34)
BASOPHILS # BLD AUTO: 0.01 10*3/MM3 (ref 0–0.3)
BASOPHILS NFR BLD AUTO: 0.2 % (ref 0–2)
BH CV ECHO MEAS - % IVS THICK: 24.1 %
BH CV ECHO MEAS - % LVPW THICK: 47.8 %
BH CV ECHO MEAS - ACS: 2.5 CM
BH CV ECHO MEAS - AO MAX PG: 6.5 MMHG
BH CV ECHO MEAS - AO MEAN PG: 3.4 MMHG
BH CV ECHO MEAS - AO ROOT AREA (BSA CORRECTED): 1.6
BH CV ECHO MEAS - AO ROOT AREA: 8 CM^2
BH CV ECHO MEAS - AO ROOT DIAM: 3.2 CM
BH CV ECHO MEAS - AO V2 MAX: 127.6 CM/SEC
BH CV ECHO MEAS - AO V2 MEAN: 84.2 CM/SEC
BH CV ECHO MEAS - AO V2 VTI: 24.6 CM
BH CV ECHO MEAS - BSA(HAYCOCK): 2 M^2
BH CV ECHO MEAS - BSA: 2 M^2
BH CV ECHO MEAS - BZI_BMI: 22.7 KILOGRAMS/M^2
BH CV ECHO MEAS - BZI_METRIC_HEIGHT: 185.4 CM
BH CV ECHO MEAS - BZI_METRIC_WEIGHT: 78 KG
BH CV ECHO MEAS - EDV(CUBED): 116.6 ML
BH CV ECHO MEAS - EDV(MOD-SP4): 69 ML
BH CV ECHO MEAS - EDV(TEICH): 112.1 ML
BH CV ECHO MEAS - EF(CUBED): 52.4 %
BH CV ECHO MEAS - EF(MOD-SP4): 55.1 %
BH CV ECHO MEAS - EF(TEICH): 44.2 %
BH CV ECHO MEAS - ESV(CUBED): 55.6 ML
BH CV ECHO MEAS - ESV(MOD-SP4): 31 ML
BH CV ECHO MEAS - ESV(TEICH): 62.6 ML
BH CV ECHO MEAS - FS: 21.9 %
BH CV ECHO MEAS - IVS/LVPW: 1.1
BH CV ECHO MEAS - IVSD: 0.95 CM
BH CV ECHO MEAS - IVSS: 1.2 CM
BH CV ECHO MEAS - LA DIMENSION: 2.6 CM
BH CV ECHO MEAS - LA/AO: 0.82
BH CV ECHO MEAS - LV DIASTOLIC VOL/BSA (35-75): 34.2 ML/M^2
BH CV ECHO MEAS - LV MASS(C)D: 151 GRAMS
BH CV ECHO MEAS - LV MASS(C)DI: 74.8 GRAMS/M^2
BH CV ECHO MEAS - LV MASS(C)S: 156.1 GRAMS
BH CV ECHO MEAS - LV MASS(C)SI: 77.3 GRAMS/M^2
BH CV ECHO MEAS - LV SYSTOLIC VOL/BSA (12-30): 15.4 ML/M^2
BH CV ECHO MEAS - LVIDD: 4.9 CM
BH CV ECHO MEAS - LVIDS: 3.8 CM
BH CV ECHO MEAS - LVLD AP4: 7.8 CM
BH CV ECHO MEAS - LVLS AP4: 7 CM
BH CV ECHO MEAS - LVOT AREA (M): 3.8 CM^2
BH CV ECHO MEAS - LVOT AREA: 3.9 CM^2
BH CV ECHO MEAS - LVOT DIAM: 2.2 CM
BH CV ECHO MEAS - LVPWD: 0.84 CM
BH CV ECHO MEAS - LVPWS: 1.2 CM
BH CV ECHO MEAS - MV A MAX VEL: 64.2 CM/SEC
BH CV ECHO MEAS - MV E MAX VEL: 74 CM/SEC
BH CV ECHO MEAS - MV E/A: 1.2
BH CV ECHO MEAS - PA ACC SLOPE: 1231 CM/SEC^2
BH CV ECHO MEAS - PA ACC TIME: 0.11 SEC
BH CV ECHO MEAS - PA PR(ACCEL): 29.9 MMHG
BH CV ECHO MEAS - RAP SYSTOLE: 10 MMHG
BH CV ECHO MEAS - RVSP: 24.9 MMHG
BH CV ECHO MEAS - SI(AO): 97.8 ML/M^2
BH CV ECHO MEAS - SI(CUBED): 30.3 ML/M^2
BH CV ECHO MEAS - SI(MOD-SP4): 18.8 ML/M^2
BH CV ECHO MEAS - SI(TEICH): 24.5 ML/M^2
BH CV ECHO MEAS - SV(AO): 197.4 ML
BH CV ECHO MEAS - SV(CUBED): 61.1 ML
BH CV ECHO MEAS - SV(MOD-SP4): 38 ML
BH CV ECHO MEAS - SV(TEICH): 49.5 ML
BH CV ECHO MEAS - TR MAX VEL: 193.1 CM/SEC
BILIRUB SERPL-MCNC: 0.7 MG/DL (ref 0.2–1.8)
BUN BLD-MCNC: 8 MG/DL (ref 7–21)
BUN/CREAT SERPL: 17.8 (ref 7–25)
CALCIUM SPEC-SCNC: 7.5 MG/DL (ref 7.7–10)
CHLORIDE SERPL-SCNC: 104 MMOL/L (ref 99–112)
CO2 SERPL-SCNC: 22.3 MMOL/L (ref 24.3–31.9)
CREAT BLD-MCNC: 0.45 MG/DL (ref 0.43–1.29)
DEPRECATED RDW RBC AUTO: 44.7 FL (ref 37–54)
EOSINOPHIL # BLD AUTO: 0.1 10*3/MM3 (ref 0–0.7)
EOSINOPHIL NFR BLD AUTO: 1.8 % (ref 0–5)
ERYTHROCYTE [DISTWIDTH] IN BLOOD BY AUTOMATED COUNT: 14.6 % (ref 11.5–14.5)
GFR SERPL CREATININE-BSD FRML MDRD: >150 ML/MIN/1.73
GLOBULIN UR ELPH-MCNC: 2.9 GM/DL
GLUCOSE BLD-MCNC: 94 MG/DL (ref 70–110)
HCT VFR BLD AUTO: 22.7 % (ref 42–52)
HCT VFR BLD AUTO: 24.1 % (ref 42–52)
HGB BLD-MCNC: 7.1 G/DL (ref 14–18)
HGB BLD-MCNC: 7.5 G/DL (ref 14–18)
IMM GRANULOCYTES # BLD: 0.02 10*3/MM3 (ref 0–0.03)
IMM GRANULOCYTES NFR BLD: 0.4 % (ref 0–0.5)
LYMPHOCYTES # BLD AUTO: 1.72 10*3/MM3 (ref 1–3)
LYMPHOCYTES NFR BLD AUTO: 31.4 % (ref 21–51)
MAGNESIUM SERPL-MCNC: 1.8 MG/DL (ref 1.7–2.6)
MAXIMAL PREDICTED HEART RATE: 181 BPM
MCH RBC QN AUTO: 27.4 PG (ref 27–33)
MCHC RBC AUTO-ENTMCNC: 31.3 G/DL (ref 33–37)
MCV RBC AUTO: 87.6 FL (ref 80–94)
MONOCYTES # BLD AUTO: 0.57 10*3/MM3 (ref 0.1–0.9)
MONOCYTES NFR BLD AUTO: 10.4 % (ref 0–10)
NEUTROPHILS # BLD AUTO: 3.06 10*3/MM3 (ref 1.4–6.5)
NEUTROPHILS NFR BLD AUTO: 55.8 % (ref 30–70)
OSMOLALITY SERPL CALC.SUM OF ELEC: 264.5 MOSM/KG (ref 273–305)
PLATELET # BLD AUTO: 224 10*3/MM3 (ref 130–400)
PMV BLD AUTO: 11.1 FL (ref 6–10)
POTASSIUM BLD-SCNC: 4.5 MMOL/L (ref 3.5–5.3)
PROT SERPL-MCNC: 5.7 G/DL (ref 6–8)
RBC # BLD AUTO: 2.59 10*6/MM3 (ref 4.7–6.1)
SODIUM BLD-SCNC: 133 MMOL/L (ref 135–153)
SODIUM BLD-SCNC: 134 MMOL/L (ref 135–153)
SODIUM BLD-SCNC: 134 MMOL/L (ref 135–153)
SODIUM BLD-SCNC: 136 MMOL/L (ref 135–153)
STRESS TARGET HR: 154 BPM
WBC NRBC COR # BLD: 5.48 10*3/MM3 (ref 4.5–12.5)

## 2018-10-11 PROCEDURE — 99232 SBSQ HOSP IP/OBS MODERATE 35: CPT | Performed by: INTERNAL MEDICINE

## 2018-10-11 PROCEDURE — 84295 ASSAY OF SERUM SODIUM: CPT | Performed by: INTERNAL MEDICINE

## 2018-10-11 PROCEDURE — 83735 ASSAY OF MAGNESIUM: CPT | Performed by: INTERNAL MEDICINE

## 2018-10-11 PROCEDURE — 25010000002 MORPHINE PER 10 MG: Performed by: INTERNAL MEDICINE

## 2018-10-11 PROCEDURE — 85018 HEMOGLOBIN: CPT | Performed by: INTERNAL MEDICINE

## 2018-10-11 PROCEDURE — 93306 TTE W/DOPPLER COMPLETE: CPT

## 2018-10-11 PROCEDURE — 80053 COMPREHEN METABOLIC PANEL: CPT | Performed by: INTERNAL MEDICINE

## 2018-10-11 PROCEDURE — 25010000002 LORAZEPAM PER 2 MG: Performed by: NURSE PRACTITIONER

## 2018-10-11 PROCEDURE — 85014 HEMATOCRIT: CPT | Performed by: INTERNAL MEDICINE

## 2018-10-11 PROCEDURE — 94799 UNLISTED PULMONARY SVC/PX: CPT

## 2018-10-11 PROCEDURE — 93306 TTE W/DOPPLER COMPLETE: CPT | Performed by: INTERNAL MEDICINE

## 2018-10-11 PROCEDURE — 85025 COMPLETE CBC W/AUTO DIFF WBC: CPT | Performed by: INTERNAL MEDICINE

## 2018-10-11 RX ORDER — METOPROLOL TARTRATE 5 MG/5ML
INJECTION INTRAVENOUS
Status: COMPLETED
Start: 2018-10-11 | End: 2018-10-11

## 2018-10-11 RX ORDER — METOPROLOL TARTRATE 5 MG/5ML
2.5 INJECTION INTRAVENOUS ONCE
Status: COMPLETED | OUTPATIENT
Start: 2018-10-11 | End: 2018-10-11

## 2018-10-11 RX ADMIN — LORAZEPAM 1 MG: 2 INJECTION INTRAMUSCULAR; INTRAVENOUS at 13:30

## 2018-10-11 RX ADMIN — LORAZEPAM 1 MG: 2 INJECTION INTRAMUSCULAR; INTRAVENOUS at 16:52

## 2018-10-11 RX ADMIN — LORAZEPAM 1 MG: 1 TABLET ORAL at 21:41

## 2018-10-11 RX ADMIN — MORPHINE SULFATE 1 MG: 2 INJECTION, SOLUTION INTRAMUSCULAR; INTRAVENOUS at 10:30

## 2018-10-11 RX ADMIN — LORAZEPAM 1 MG: 1 TABLET ORAL at 00:49

## 2018-10-11 RX ADMIN — MORPHINE SULFATE 1 MG: 2 INJECTION, SOLUTION INTRAMUSCULAR; INTRAVENOUS at 00:48

## 2018-10-11 RX ADMIN — Medication 10 ML: at 08:41

## 2018-10-11 RX ADMIN — METOPROLOL TARTRATE 2.5 MG: 5 INJECTION, SOLUTION INTRAVENOUS at 17:30

## 2018-10-11 RX ADMIN — Medication 3 ML: at 08:40

## 2018-10-11 RX ADMIN — PANTOPRAZOLE SODIUM 40 MG: 40 TABLET, DELAYED RELEASE ORAL at 16:56

## 2018-10-11 RX ADMIN — METOPROLOL TARTRATE 2.5 MG: 5 INJECTION INTRAVENOUS at 17:30

## 2018-10-11 RX ADMIN — METOPROLOL TARTRATE 25 MG: 25 TABLET, FILM COATED ORAL at 18:33

## 2018-10-11 RX ADMIN — PANTOPRAZOLE SODIUM 40 MG: 40 TABLET, DELAYED RELEASE ORAL at 06:02

## 2018-10-11 NOTE — PLAN OF CARE
Problem: Alcohol Withdrawal Acute, Risk/Actual (Adult)  Goal: Signs and Symptoms of Listed Potential Problems Will be Absent, Minimized or Managed (Alcohol Withdrawal Acute, Risk/Actual)  Outcome: Ongoing (interventions implemented as appropriate)      Problem: Patient Care Overview  Goal: Plan of Care Review  Outcome: Ongoing (interventions implemented as appropriate)    Goal: Individualization and Mutuality  Outcome: Ongoing (interventions implemented as appropriate)    Goal: Discharge Needs Assessment  Outcome: Ongoing (interventions implemented as appropriate)    Goal: Interprofessional Rounds/Family Conf  Outcome: Ongoing (interventions implemented as appropriate)      Problem: Anemia (Adult)  Goal: Identify Related Risk Factors and Signs and Symptoms  Outcome: Ongoing (interventions implemented as appropriate)    Goal: Symptom Improvement  Outcome: Ongoing (interventions implemented as appropriate)      Problem: Patient Care Overview  Goal: Plan of Care Review  Outcome: Ongoing (interventions implemented as appropriate)    Goal: Individualization and Mutuality  Outcome: Ongoing (interventions implemented as appropriate)    Goal: Discharge Needs Assessment  Outcome: Ongoing (interventions implemented as appropriate)    Goal: Interprofessional Rounds/Family Conf  Outcome: Ongoing (interventions implemented as appropriate)

## 2018-10-11 NOTE — PLAN OF CARE
Problem: Alcohol Withdrawal Acute, Risk/Actual (Adult)  Goal: Signs and Symptoms of Listed Potential Problems Will be Absent, Minimized or Managed (Alcohol Withdrawal Acute, Risk/Actual)  Outcome: Ongoing (interventions implemented as appropriate)      Problem: Patient Care Overview  Goal: Plan of Care Review  Outcome: Ongoing (interventions implemented as appropriate)    Goal: Individualization and Mutuality  Outcome: Ongoing (interventions implemented as appropriate)    Goal: Discharge Needs Assessment  Outcome: Ongoing (interventions implemented as appropriate)    Goal: Interprofessional Rounds/Family Conf  Outcome: Ongoing (interventions implemented as appropriate)      Problem: Anemia (Adult)  Goal: Identify Related Risk Factors and Signs and Symptoms  Outcome: Ongoing (interventions implemented as appropriate)      Problem: Patient Care Overview  Goal: Plan of Care Review  Outcome: Ongoing (interventions implemented as appropriate)    Goal: Individualization and Mutuality  Outcome: Ongoing (interventions implemented as appropriate)    Goal: Discharge Needs Assessment  Outcome: Ongoing (interventions implemented as appropriate)    Goal: Interprofessional Rounds/Family Conf  Outcome: Ongoing (interventions implemented as appropriate)

## 2018-10-11 NOTE — PROGRESS NOTES
[facility] HOSPITALIST PROGRESS NOTE     Patient Identification:  Name:  Tavo Murrieta  Age:  38 y.o.  Sex:  male  :  1980  MRN:  [id number]  Visit Number:  [id number]  Primary Care Provider:  Duc Pena MD    Length of stay:  4    Chief complaint:  38 y.o. old male admitted with Alcohol Problem          Subjective:    No acute issues overnight.  Patient denies any new complaints other than soreness in his legs.  Patient had a bowel movement which was dark in color.       Current Hospital Meds:    [START ON 10/13/2018] chlordiazePOXIDE 10 mg Oral TID   [START ON 10/14/2018] chlordiazePOXIDE 10 mg Oral Once   [START ON 10/15/2018] chlordiazePOXIDE 10 mg Oral Once   [START ON 10/12/2018] chlordiazePOXIDE 25 mg Oral TID   [START ON 10/14/2018] chlordiazePOXIDE 5 mg Oral Once   [START ON 10/14/2018] chlordiazePOXIDE 5 mg Oral Once   enoxaparin 40 mg Subcutaneous Daily   pantoprazole 40 mg Oral BID AC   potassium & sodium phosphates 2 packet Oral Once   sodium chloride 10 mL Intravenous Q12H   sodium chloride 3 mL Intravenous Q12H        ----------------------------------------------------------------------------------------------------------------------  Vital Signs:  Temp:  [98.2 °F (36.8 °C)-98.7 °F (37.1 °C)] 98.3 °F (36.8 °C)  Heart Rate:  [] 93  Resp:  [10-30] 16  BP: (102-145)/() 124/76  1    10/09/18  0500 10/10/18  0500 10/11/18  0400   Weight: 74.8 kg (164 lb 14.5 oz) 78 kg (172 lb) 76.2 kg (168 lb 1.6 oz)     Body mass index is 22.18 kg/m².    Intake/Output Summary (Last 24 hours) at 10/11/18 1238  Last data filed at 10/11/18 0400   Gross per 24 hour   Intake              420 ml   Output             1300 ml   Net             -880 ml     Diet Regular  ----------------------------------------------------------------------------------------------------------------------  Physical exam:  Constitutional:  Well-developed and well-nourished.     HENT:  Head:  Normocephalic and  atraumatic.  Mouth:  Moist mucous membranes.    Eyes:  Conjunctivae and EOM are normal.  Pupils are equal, round, and reactive to light.   Neck:  Neck supple.  No JVD present.    Cardiovascular:  Regular rate and rhythm. S1+S2. No murmur, rubs or gallops.   Pulmonary/Chest: Clear to auscultation bilaterally.   Abdominal:  Soft. Non-tender. No viscera palpable.  Bowel sounds audible.   Musculoskeletal: No deformity or joint swelling.   Peripheral vascular: Bilateral dorsalis pedis palpable. No edema.   Neurological:  Alert and oriented to person, place, and time.  Cranial nerves grossly intact. Strength bilaterally symmetrical in upper and lower extremities.   Skin:  Skin is warm and dry. No rash noted. No pallor.   ----------------------------------------------------------------------------------------------------------------------  Tele:  Sinus tachycardia  ----------------------------------------------------------------------------------------------------------------------        Results from last 7 days  Lab Units 10/11/18  0037 10/10/18  1711 10/10/18  0023  10/09/18  0042  10/08/18  0326 10/07/18  1549   WBC 10*3/mm3 5.48  --  4.44*  --  4.02*  --  4.14*  --    HEMOGLOBIN g/dL 7.1* 8.1* 7.3*  < > 6.8*  < > 6.1*  --    HEMATOCRIT % 22.7* 26.2* 23.4*  < > 21.9*  < > 20.0*  --    MCV fL 87.6  --  87.6  --  90.5  --  89.7  --    MCHC g/dL 31.3*  --  31.2*  --  31.1*  --  30.5*  --    PLATELETS 10*3/mm3 224  --  172  --  162  --  169  --    INR   --   --   --   --   --   --  1.29* 1.30*   < > = values in this interval not displayed.        Results from last 7 days  Lab Units 10/11/18  0530 10/11/18  0037 10/10/18  1711  10/10/18  0023  10/09/18  0042  10/08/18  0326   SODIUM mmol/L 136 133* 134*  < > 136  < > 135  < > 135   POTASSIUM mmol/L  --  4.5  --   --  3.8  --  3.9  --  3.4*   MAGNESIUM mg/dL  --  1.8  --   --  1.5*  --   --   --  1.8   CHLORIDE mmol/L  --  104  --   --  107  --  105  --  103   CO2 mmol/L  --   22.3*  --   --  22.4*  --  27.5  --  26.8   BUN mg/dL  --  8  --   --  <5*  --  5*  --  8   CREATININE mg/dL  --  0.45  --   --  0.44  --  0.54  --  0.48   EGFR IF NONAFRICN AM mL/min/1.73  --  >150  --   --  >150  --  >150  --  >150   CALCIUM mg/dL  --  7.5*  --   --  7.3*  --  6.7*  --  6.5*   GLUCOSE mg/dL  --  94  --   --  95  --  113*  --  103   ALBUMIN g/dL  --  2.80*  --   --  2.50*  --  2.50*  --  2.90*   BILIRUBIN mg/dL  --  0.7  --   --  0.7  --  0.9  --  1.2   ALK PHOS U/L  --  110  --   --  117  --  126  --  127   AST (SGOT) U/L  --  40*  --   --  38*  --  56*  --  81*   ALT (SGPT) U/L  --  14  --   --  14  --  21  --  25   < > = values in this interval not displayed.Estimated Creatinine Clearance: 237.8 mL/min (by C-G formula based on SCr of 0.45 mg/dL).    No results found for: AMMONIA                    I have personally looked at the labs and they are summarized above.  ----------------------------------------------------------------------------------------------------------------------  Imaging Results (last 24 hours)     ** No results found for the last 24 hours. **        ----------------------------------------------------------------------------------------------------------------------  Assessment and Plan:    - GI bleeding  Continues to have dark-colored stools though it is clearing up some.  Likely old blood in bowels.  Status post EGD and colonoscopy that showed gastropathy, old blood in the stomach and no evidence of active bleeding.    Continue PPI.  Patient tolerating  PO diet and will continue.      - Anemia due to GI bleeding  S/p transfusion with 4 units of PRBC.  Hemoglobin improved to 7.8 yesterday but is down to 7.1 again today, was 4.6 on admission.    Workup show low iron and, low iron saturation and low ferritin, consistent with iron deficiency.  Will start iron replacement upon discharge.   Will recheck hemoglobin this evening and aim to keep hemoglobin > 7.    - EtOH  withdrawal  On CIWA protocol.  According to the nursing staff, patient has been asking for lorazepam but did not have any symptoms and signs concerning for alcohol withdrawal.  Nursing staff instructed to give lorazepam according to [facility] protocol.  Continue Librium taper     - Sinus tachycardia  Heart rate is improved since yesterday but still continues to have tachycardia.  2-D echocardiogram shows normal LV systolic function with EF of 60-65%.  We'll start patient on metoprolol.    - Enterocolitis  CT scan on admission was concerning for enterocolitis.  Patient is asymptomatic, afebrile and WBC is normal.  No need for abx at this time.     - Coagulopathy  INR was slightly elevated.  Patient received a dose of vitamin K. No evidence of bleeding.     - Leg pain  Likely restless legs due to anemia and possibly alcohol related myopathy.    - Alcoholic hepatitis  AST was mildly elevated on admission and is improved now.  CT scan showed diffuse fatty infiltration of the liver.  Patient is advised to follow-up with hepatology and advised to stay away from alcohol.    - EtOH abuse/dependence  Patient was encouraged to quit drinking alcohol and counseled on the dangers of alcohol use.  Ration is planning to go to rehabilitation upon discharge.    - Prophylaxis  DVT: SCDs.  No pharmacological prophylaxis due to GI bleeding.  GI: PPI    - Disposition  Transfer the patient to stepdown.  If hemoglobin remains stable, patient may be discharged in a.m.        The patient is high risk due to: Severe anemia, GI bleeding, alcohol withdrawal    I discussed the patients findings and my recommendations with patient, family and nursing staff.    Sydney Watson MD  10/11/18  12:38 PM

## 2018-10-12 VITALS
RESPIRATION RATE: 12 BRPM | DIASTOLIC BLOOD PRESSURE: 80 MMHG | WEIGHT: 173.2 LBS | TEMPERATURE: 98.1 F | SYSTOLIC BLOOD PRESSURE: 97 MMHG | BODY MASS INDEX: 22.95 KG/M2 | HEIGHT: 73 IN | OXYGEN SATURATION: 97 % | HEART RATE: 93 BPM

## 2018-10-12 LAB
ALBUMIN SERPL-MCNC: 3.1 G/DL (ref 3.5–5)
ALBUMIN/GLOB SERPL: 1 G/DL (ref 1.5–2.5)
ALP SERPL-CCNC: 106 U/L (ref 40–129)
ALT SERPL W P-5'-P-CCNC: 13 U/L (ref 10–44)
ANION GAP SERPL CALCULATED.3IONS-SCNC: 2.6 MMOL/L (ref 3.6–11.2)
AST SERPL-CCNC: 31 U/L (ref 10–34)
BASOPHILS # BLD AUTO: 0.03 10*3/MM3 (ref 0–0.3)
BASOPHILS NFR BLD AUTO: 0.5 % (ref 0–2)
BILIRUB SERPL-MCNC: 0.7 MG/DL (ref 0.2–1.8)
BUN BLD-MCNC: 14 MG/DL (ref 7–21)
BUN/CREAT SERPL: 25.9 (ref 7–25)
CALCIUM SPEC-SCNC: 8 MG/DL (ref 7.7–10)
CHLORIDE SERPL-SCNC: 111 MMOL/L (ref 99–112)
CO2 SERPL-SCNC: 25.4 MMOL/L (ref 24.3–31.9)
CREAT BLD-MCNC: 0.54 MG/DL (ref 0.43–1.29)
DEPRECATED RDW RBC AUTO: 44.3 FL (ref 37–54)
EOSINOPHIL # BLD AUTO: 0.11 10*3/MM3 (ref 0–0.7)
EOSINOPHIL NFR BLD AUTO: 1.7 % (ref 0–5)
ERYTHROCYTE [DISTWIDTH] IN BLOOD BY AUTOMATED COUNT: 14.3 % (ref 11.5–14.5)
GFR SERPL CREATININE-BSD FRML MDRD: >150 ML/MIN/1.73
GLOBULIN UR ELPH-MCNC: 3 GM/DL
GLUCOSE BLD-MCNC: 92 MG/DL (ref 70–110)
HCT VFR BLD AUTO: 23.5 % (ref 42–52)
HGB BLD-MCNC: 7 G/DL (ref 14–18)
IMM GRANULOCYTES # BLD: 0.04 10*3/MM3 (ref 0–0.03)
IMM GRANULOCYTES NFR BLD: 0.6 % (ref 0–0.5)
LYMPHOCYTES # BLD AUTO: 1.8 10*3/MM3 (ref 1–3)
LYMPHOCYTES NFR BLD AUTO: 28.4 % (ref 21–51)
MCH RBC QN AUTO: 26.1 PG (ref 27–33)
MCHC RBC AUTO-ENTMCNC: 29.8 G/DL (ref 33–37)
MCV RBC AUTO: 87.7 FL (ref 80–94)
MONOCYTES # BLD AUTO: 0.61 10*3/MM3 (ref 0.1–0.9)
MONOCYTES NFR BLD AUTO: 9.6 % (ref 0–10)
NEUTROPHILS # BLD AUTO: 3.75 10*3/MM3 (ref 1.4–6.5)
NEUTROPHILS NFR BLD AUTO: 59.2 % (ref 30–70)
OSMOLALITY SERPL CALC.SUM OF ELEC: 277.7 MOSM/KG (ref 273–305)
PLATELET # BLD AUTO: 269 10*3/MM3 (ref 130–400)
PMV BLD AUTO: 10.4 FL (ref 6–10)
POTASSIUM BLD-SCNC: 4.1 MMOL/L (ref 3.5–5.3)
PROT SERPL-MCNC: 6.1 G/DL (ref 6–8)
RBC # BLD AUTO: 2.68 10*6/MM3 (ref 4.7–6.1)
SODIUM BLD-SCNC: 137 MMOL/L (ref 135–153)
SODIUM BLD-SCNC: 139 MMOL/L (ref 135–153)
SODIUM BLD-SCNC: 139 MMOL/L (ref 135–153)
WBC NRBC COR # BLD: 6.34 10*3/MM3 (ref 4.5–12.5)

## 2018-10-12 PROCEDURE — 99239 HOSP IP/OBS DSCHRG MGMT >30: CPT | Performed by: INTERNAL MEDICINE

## 2018-10-12 PROCEDURE — 84295 ASSAY OF SERUM SODIUM: CPT | Performed by: INTERNAL MEDICINE

## 2018-10-12 PROCEDURE — 80053 COMPREHEN METABOLIC PANEL: CPT | Performed by: INTERNAL MEDICINE

## 2018-10-12 PROCEDURE — 85025 COMPLETE CBC W/AUTO DIFF WBC: CPT | Performed by: INTERNAL MEDICINE

## 2018-10-12 PROCEDURE — 25010000002 ENOXAPARIN PER 10 MG: Performed by: SURGERY

## 2018-10-12 RX ORDER — FOLIC ACID 1 MG/1
1 TABLET ORAL DAILY
Qty: 30 TABLET | Refills: 0 | Status: SHIPPED | OUTPATIENT
Start: 2018-10-12 | End: 2018-10-12

## 2018-10-12 RX ORDER — CHLORDIAZEPOXIDE HYDROCHLORIDE 5 MG/1
CAPSULE, GELATIN COATED ORAL
Qty: 18 CAPSULE | Refills: 0 | Status: SHIPPED | OUTPATIENT
Start: 2018-10-12 | End: 2019-01-14

## 2018-10-12 RX ORDER — PANTOPRAZOLE SODIUM 40 MG/1
40 TABLET, DELAYED RELEASE ORAL
Qty: 30 TABLET | Refills: 0 | Status: SHIPPED | OUTPATIENT
Start: 2018-10-12 | End: 2018-10-12

## 2018-10-12 RX ORDER — PANTOPRAZOLE SODIUM 40 MG/1
40 TABLET, DELAYED RELEASE ORAL
Qty: 30 TABLET | Refills: 0 | Status: SHIPPED | OUTPATIENT
Start: 2018-10-12 | End: 2019-01-14

## 2018-10-12 RX ORDER — FOLIC ACID 1 MG/1
1 TABLET ORAL DAILY
Qty: 30 TABLET | Refills: 0 | Status: SHIPPED | OUTPATIENT
Start: 2018-10-12 | End: 2019-01-14

## 2018-10-12 RX ORDER — LANOLIN ALCOHOL/MO/W.PET/CERES
100 CREAM (GRAM) TOPICAL DAILY
Qty: 30 TABLET | Refills: 0 | Status: SHIPPED | OUTPATIENT
Start: 2018-10-12 | End: 2019-01-14

## 2018-10-12 RX ORDER — IRON POLYSACCHARIDE COMPLEX 150 MG
150 CAPSULE ORAL DAILY
Qty: 30 CAPSULE | Refills: 0 | Status: SHIPPED | OUTPATIENT
Start: 2018-10-12 | End: 2019-01-14

## 2018-10-12 RX ADMIN — ENOXAPARIN SODIUM 40 MG: 40 INJECTION SUBCUTANEOUS at 08:51

## 2018-10-12 RX ADMIN — PANTOPRAZOLE SODIUM 40 MG: 40 TABLET, DELAYED RELEASE ORAL at 05:26

## 2018-10-12 RX ADMIN — CHLORDIAZEPOXIDE HYDROCHLORIDE 25 MG: 25 CAPSULE ORAL at 08:51

## 2018-10-12 RX ADMIN — Medication 3 ML: at 08:52

## 2018-10-12 RX ADMIN — Medication 10 ML: at 08:53

## 2018-10-12 RX ADMIN — METOPROLOL TARTRATE 25 MG: 25 TABLET, FILM COATED ORAL at 08:51

## 2018-10-12 RX ADMIN — LORAZEPAM 1 MG: 1 TABLET ORAL at 05:26

## 2018-10-12 NOTE — PLAN OF CARE
Problem: Patient Care Overview  Goal: Plan of Care Review   10/12/18 0307   Coping/Psychosocial   Plan of Care Reviewed With patient     Goal: Discharge Needs Assessment   10/12/18 0307   Discharge Needs Assessment   Concerns to be Addressed medication   Patient/Family Anticipates Transition to inpatient rehabilitation facility   Transportation Concerns rides, unreliable from others   Transportation Anticipated family or friend will provide       Problem: Anemia (Adult)  Goal: Identify Related Risk Factors and Signs and Symptoms   10/12/18 0307   Anemia (Adult)   Signs and Symptoms (Anemia) fatigue

## 2018-10-12 NOTE — DISCHARGE SUMMARY
[facility] HOSPITALIST MEDICINE DISCHARGE SUMMARY    Patient Identification:  Name:  Tavo Murrieta  Age:  38 y.o.  Sex:  male  :  1980  MRN:  [id number]  Visit Number:  [id number]    Date of Admission: 10/7/2018  Date of Discharge:  10/12/2018     PCP: Duc Pena MD    DISCHARGE DIAGNOSIS  · GI bleeding due to gastropathy  · Anemia due to GI bleeding, status post transfusion with 4 units of PRBC.  Hemoglobin is stable around 7 or discharge  · Alcohol withdrawal, improved  · Sinus tachycardia, improved  · Coagulopathy, improved  · Alcoholic hepatitis  · EtOH abuse/dependence  · Restless legs      CONSULTS   General surgery    PROCEDURES PERFORMED  10/8/18: EGD and colonoscopy that showed evidence of recent bleed in the stomach however no source of bleeding seen.  Edema of the stomach body mucosa, hiatal hernia and inflammation of the cecal wall.    REASON FOR ADMISSION  Patient is a 38 y.o. male presented to [facility] complaining of EtOH detox.  Please see the admitting history and physical for further details.       HOSPITAL COURSE   Mr. Murrieta is a 38 year old male who presented to [facility] ER on 10/7/18 for ETOH detox. During his medical clearance he was found to have a hemoglobin of 4.6 HCT of 15.3, with a positive hemoccult.  Patient was admitted to intensive care unit for further evaluation and management.    Patient was transfused with 4 units of packed red blood cells.  General surgery was consulted and patient underwent EGD and colonoscopy that showed evidence of recent bleed in the stomach but no source of bleeding.  Patient had improvement in his hemoglobin to greater than 7 with 4 units of packed her blood cells but hemoglobin continue to stay around 7.  Patient was continued on Cipro protocol for alcohol withdrawal.  He was also given multivitamin.  Patient did not have any alcohol withdrawal seizure and he was put on scheduled Librium taper.  Patient  developed tachycardia during the course of his hospitalization and underwent 2-D echocardiogram that showed normal LV systolic function with an EF of 60%, no wall motion abnormality and no significant valvular abnormalities.  Patient's EKG showed sinus tachycardia.  He was started on metoprolol and had improvement in his heart rate.  Patient was advised to quit drinking alcohol and counseled on the dangers of continued alcohol use.  His CT scan showed fatty infiltration of the liver and LFTs were mildly elevated on admission which improved during the course of his hospitalization.  Patient was willing to go to alcohol rehabilitation program.   provided patient with list of rehabilitation places and patient is scheduled to go to inpatient rehabilitation upon discharge.  His hemoglobin stays stable around 7.  Workup showed low iron and patient has been started on iron replacement.  Patient has been asking for Ativan but did not exhibit any signs or symptoms of alcohol withdrawal.  I discussed with patient's mother who has enrolled him in an inpatient stabilization program in [place].  Patient is being discharged in stable condition.      DISCHARGE DISPOSITION   Stable    DISCHARGE MEDICATIONS:     Discharge Medications      New Medications      Instructions Start Date   chlordiazePOXIDE 5 MG capsule  Commonly known as:  LIBRIUM   2 cap TID for 2 days then 1 cap TID for 1 day then 1 cap BID for 1 day then 1 cap at night for 1 day then stop      folic acid 1 MG tablet  Commonly known as:  FOLVITE   1 mg, Oral, Daily      iron polysaccharides 150 MG capsule  Commonly known as:  NIFEREX   150 mg, Oral, Daily      metoprolol tartrate 25 MG tablet  Commonly known as:  LOPRESSOR   25 mg, Oral, Every 12 Hours Scheduled      pantoprazole 40 MG EC tablet  Commonly known as:  PROTONIX   40 mg, Oral, 2 Times Daily Before Meals      thiamine 100 MG tablet  Commonly known as:  VITAMIN B1   100 mg, Oral, Daily              Diet Instructions     Diet: Regular       Discharge Diet:  Regular          Activity Instructions     Activity as Tolerated           Additional Instructions for the Follow-ups that You Need to Schedule     Discharge Follow-up with PCP    As directed      Currently Documented PCP:  Duc Pena MD  PCP Phone Number:  [phone]    Follow Up Details:  1 week         Discharge Follow-up with Specified Provider: gastroenterology; 1 Week    As directed      To:  gastroenterology    Follow Up:  1 Week           Follow-up Information     Duc Pena MD .    Specialty:  Family Medicine  Why:  1 week  Contact information:  [address] [place] [address]  [phone]                   TEST  RESULTS PENDING AT DISCHARGE       Sydney Watson MD  10/12/18  10:23 AM    Please note that this discharge summary required more than 30 minutes to complete.    Please send a copy of this dictation to the following providers:    Duc Pena MD

## 2018-10-12 NOTE — PROGRESS NOTES
Discharge Planning Assessment   [facility]     Patient Name: Tavo Murrieta  MRN: [id number]  Today's Date: 10/12/2018    Admit Date: 10/7/2018         Discharge Plan     Row Name 10/12/18 1038       Plan    Final Discharge Disposition Code 01 - home or self-care    Final Note Pt being discharged home on this date.  SS provided resources for substance abuse upon admission. Per nurse pt is returning home and will be contacting alcohol rehabs.  SS will continue to follow.               Expected Discharge Date and Time     Expected Discharge Date Expected Discharge Time    Oct 12, 2018         Margo Anderson

## 2018-10-12 NOTE — PLAN OF CARE
Problem: Alcohol Withdrawal Acute, Risk/Actual (Adult)  Goal: Signs and Symptoms of Listed Potential Problems Will be Absent, Minimized or Managed (Alcohol Withdrawal Acute, Risk/Actual)  Outcome: Outcome(s) achieved Date Met: 10/12/18      Problem: Patient Care Overview  Goal: Plan of Care Review  Outcome: Outcome(s) achieved Date Met: 10/12/18    Goal: Discharge Needs Assessment  Outcome: Outcome(s) achieved Date Met: 10/12/18      Problem: Anemia (Adult)  Goal: Identify Related Risk Factors and Signs and Symptoms  Outcome: Outcome(s) achieved Date Met: 10/12/18    Goal: Symptom Improvement  Outcome: Outcome(s) achieved Date Met: 10/12/18      Problem: Patient Care Overview  Goal: Interprofessional Rounds/Family Conf  Outcome: Outcome(s) achieved Date Met: 10/12/18

## 2018-10-13 ENCOUNTER — READMISSION MANAGEMENT (OUTPATIENT)
Dept: CALL CENTER | Facility: HOSPITAL | Age: 38
End: 2018-10-13

## 2018-10-13 NOTE — OUTREACH NOTE
Prep Survey      Responses   Facility patient discharged from?  [facility]   Is patient eligible?  No   What are the reasons patient is not eligible?  Other [ETOH]   Does the patient have one of the following disease processes/diagnoses(primary or secondary)?  Other   Prep survey completed?  Yes          Selena Dillard RN

## 2019-01-14 ENCOUNTER — HOSPITAL ENCOUNTER (INPATIENT)
Facility: HOSPITAL | Age: 39
LOS: 2 days | Discharge: HOME | End: 2019-01-16
Attending: EMERGENCY MEDICINE | Admitting: INTERNAL MEDICINE

## 2019-01-14 ENCOUNTER — APPOINTMENT (OUTPATIENT)
Dept: GENERAL RADIOLOGY | Facility: HOSPITAL | Age: 39
End: 2019-01-14

## 2019-01-14 DIAGNOSIS — D64.9 SYMPTOMATIC ANEMIA: Primary | ICD-10-CM

## 2019-01-14 PROBLEM — F10.939 SEIZURE DUE TO ALCOHOL WITHDRAWAL (HCC): Chronic | Status: ACTIVE | Noted: 2019-01-14

## 2019-01-14 PROBLEM — J06.9 VIRAL UPPER RESPIRATORY TRACT INFECTION: Status: ACTIVE | Noted: 2019-01-14

## 2019-01-14 PROBLEM — IMO0001 ALCOHOLISM /ALCOHOL ABUSE: Status: ACTIVE | Noted: 2019-01-14

## 2019-01-14 PROBLEM — R56.9 SEIZURE DUE TO ALCOHOL WITHDRAWAL (HCC): Chronic | Status: ACTIVE | Noted: 2019-01-14

## 2019-01-14 PROBLEM — E87.1 HYPONATREMIA: Status: ACTIVE | Noted: 2019-01-14

## 2019-01-14 LAB
ABO GROUP BLD: NORMAL
ALBUMIN SERPL-MCNC: 4.04 G/DL (ref 3.2–4.8)
ALBUMIN/GLOB SERPL: 1.3 G/DL (ref 1.5–2.5)
ALP SERPL-CCNC: 92 U/L (ref 25–100)
ALT SERPL W P-5'-P-CCNC: 19 U/L (ref 7–40)
AMPHET+METHAMPHET UR QL: NEGATIVE
AMPHETAMINES UR QL: NEGATIVE
ANION GAP SERPL CALCULATED.3IONS-SCNC: 7 MMOL/L (ref 3–11)
APAP SERPL-MCNC: <10 MCG/ML (ref 0–30)
APTT PPP: 29.6 SECONDS (ref 24–37)
AST SERPL-CCNC: 20 U/L (ref 0–33)
BARBITURATES UR QL SCN: NEGATIVE
BASOPHILS # BLD AUTO: 0.01 10*3/MM3 (ref 0–0.2)
BASOPHILS NFR BLD AUTO: 0.2 % (ref 0–1)
BENZODIAZ UR QL SCN: NEGATIVE
BILIRUB SERPL-MCNC: 0.8 MG/DL (ref 0.3–1.2)
BILIRUB UR QL STRIP: NEGATIVE
BLD GP AB SCN SERPL QL: NEGATIVE
BUN BLD-MCNC: 6 MG/DL (ref 9–23)
BUN/CREAT SERPL: 9.2 (ref 7–25)
BUPRENORPHINE SERPL-MCNC: NEGATIVE NG/ML
CALCIUM SPEC-SCNC: 8.6 MG/DL (ref 8.7–10.4)
CANNABINOIDS SERPL QL: NEGATIVE
CHLORIDE SERPL-SCNC: 100 MMOL/L (ref 99–109)
CLARITY UR: CLEAR
CO2 SERPL-SCNC: 23 MMOL/L (ref 20–31)
COCAINE UR QL: NEGATIVE
COLOR UR: YELLOW
CREAT BLD-MCNC: 0.65 MG/DL (ref 0.6–1.3)
DEPRECATED RDW RBC AUTO: 44.5 FL (ref 37–54)
EOSINOPHIL # BLD AUTO: 0.03 10*3/MM3 (ref 0–0.3)
EOSINOPHIL NFR BLD AUTO: 0.5 % (ref 0–3)
ERYTHROCYTE [DISTWIDTH] IN BLOOD BY AUTOMATED COUNT: 21.5 % (ref 11.3–14.5)
ETHANOL BLD-MCNC: 122 MG/DL (ref 0–10)
GFR SERPL CREATININE-BSD FRML MDRD: 137 ML/MIN/1.73
GLOBULIN UR ELPH-MCNC: 3.2 GM/DL
GLUCOSE BLD-MCNC: 124 MG/DL (ref 70–100)
GLUCOSE UR STRIP-MCNC: NEGATIVE MG/DL
HAV IGM SERPL QL IA: NORMAL
HBV CORE IGM SERPL QL IA: NORMAL
HBV SURFACE AG SERPL QL IA: NORMAL
HCT VFR BLD AUTO: 16.7 % (ref 38.9–50.9)
HCV AB SER DONR QL: NORMAL
HGB BLD-MCNC: 4 G/DL (ref 13.1–17.5)
HGB UR QL STRIP.AUTO: NEGATIVE
HOLD SPECIMEN: NORMAL
HOLD SPECIMEN: NORMAL
HYPOCHROMIA BLD QL: NORMAL
IMM GRANULOCYTES # BLD AUTO: 0.01 10*3/MM3 (ref 0–0.03)
IMM GRANULOCYTES NFR BLD AUTO: 0.2 % (ref 0–0.6)
INR PPP: 1.32 (ref 0.85–1.16)
KETONES UR QL STRIP: NEGATIVE
LEUKOCYTE ESTERASE UR QL STRIP.AUTO: NEGATIVE
LIPASE SERPL-CCNC: 54 U/L (ref 6–51)
LYMPHOCYTES # BLD AUTO: 1.85 10*3/MM3 (ref 0.6–4.8)
LYMPHOCYTES NFR BLD AUTO: 31.5 % (ref 24–44)
MCH RBC QN AUTO: 14 PG (ref 27–31)
MCHC RBC AUTO-ENTMCNC: 24 G/DL (ref 32–36)
MCV RBC AUTO: 58.6 FL (ref 80–99)
METHADONE UR QL SCN: NEGATIVE
MICROCYTES BLD QL: NORMAL
MONOCYTES # BLD AUTO: 0.39 10*3/MM3 (ref 0–1)
MONOCYTES NFR BLD AUTO: 6.6 % (ref 0–12)
NEUTROPHILS # BLD AUTO: 3.6 10*3/MM3 (ref 1.5–8.3)
NEUTROPHILS NFR BLD AUTO: 61.2 % (ref 41–71)
NITRITE UR QL STRIP: NEGATIVE
OPIATES UR QL: NEGATIVE
OXYCODONE UR QL SCN: NEGATIVE
PCP UR QL SCN: NEGATIVE
PH UR STRIP.AUTO: 6 [PH] (ref 5–8)
PLAT MORPH BLD: NORMAL
PLATELET # BLD AUTO: 259 10*3/MM3 (ref 150–450)
PMV BLD AUTO: 9.1 FL (ref 6–12)
POTASSIUM BLD-SCNC: 3.5 MMOL/L (ref 3.5–5.5)
PROPOXYPH UR QL: NEGATIVE
PROT SERPL-MCNC: 7.2 G/DL (ref 5.7–8.2)
PROT UR QL STRIP: NEGATIVE
PROTHROMBIN TIME: 15.8 SECONDS (ref 11.2–14.3)
RBC # BLD AUTO: 2.85 10*6/MM3 (ref 4.2–5.76)
RH BLD: POSITIVE
SCHISTOCYTES BLD QL SMEAR: NORMAL
SODIUM BLD-SCNC: 130 MMOL/L (ref 132–146)
SP GR UR STRIP: 1.01 (ref 1–1.03)
STOMATOCYTES BLD QL SMEAR: NORMAL
T&S EXPIRATION DATE: NORMAL
TRICYCLICS UR QL SCN: NEGATIVE
UROBILINOGEN UR QL STRIP: NORMAL
WBC MORPH BLD: NORMAL
WBC NRBC COR # BLD: 5.88 10*3/MM3 (ref 3.5–10.8)
WHOLE BLOOD HOLD SPECIMEN: NORMAL
WHOLE BLOOD HOLD SPECIMEN: NORMAL

## 2019-01-14 PROCEDURE — 85045 AUTOMATED RETICULOCYTE COUNT: CPT | Performed by: PHYSICIAN ASSISTANT

## 2019-01-14 PROCEDURE — 85610 PROTHROMBIN TIME: CPT | Performed by: EMERGENCY MEDICINE

## 2019-01-14 PROCEDURE — 25010000002 THIAMINE PER 100 MG: Performed by: NURSE PRACTITIONER

## 2019-01-14 PROCEDURE — 86900 BLOOD TYPING SEROLOGIC ABO: CPT | Performed by: EMERGENCY MEDICINE

## 2019-01-14 PROCEDURE — 80306 DRUG TEST PRSMV INSTRMNT: CPT | Performed by: EMERGENCY MEDICINE

## 2019-01-14 PROCEDURE — 36430 TRANSFUSION BLD/BLD COMPNT: CPT

## 2019-01-14 PROCEDURE — 85025 COMPLETE CBC W/AUTO DIFF WBC: CPT | Performed by: EMERGENCY MEDICINE

## 2019-01-14 PROCEDURE — 71046 X-RAY EXAM CHEST 2 VIEWS: CPT

## 2019-01-14 PROCEDURE — 81003 URINALYSIS AUTO W/O SCOPE: CPT | Performed by: EMERGENCY MEDICINE

## 2019-01-14 PROCEDURE — 86850 RBC ANTIBODY SCREEN: CPT | Performed by: EMERGENCY MEDICINE

## 2019-01-14 PROCEDURE — 80307 DRUG TEST PRSMV CHEM ANLYZR: CPT | Performed by: EMERGENCY MEDICINE

## 2019-01-14 PROCEDURE — 85014 HEMATOCRIT: CPT | Performed by: FAMILY MEDICINE

## 2019-01-14 PROCEDURE — 83690 ASSAY OF LIPASE: CPT | Performed by: EMERGENCY MEDICINE

## 2019-01-14 PROCEDURE — 86901 BLOOD TYPING SEROLOGIC RH(D): CPT | Performed by: EMERGENCY MEDICINE

## 2019-01-14 PROCEDURE — 85007 BL SMEAR W/DIFF WBC COUNT: CPT | Performed by: EMERGENCY MEDICINE

## 2019-01-14 PROCEDURE — 99285 EMERGENCY DEPT VISIT HI MDM: CPT

## 2019-01-14 PROCEDURE — 85018 HEMOGLOBIN: CPT | Performed by: FAMILY MEDICINE

## 2019-01-14 PROCEDURE — 80074 ACUTE HEPATITIS PANEL: CPT | Performed by: EMERGENCY MEDICINE

## 2019-01-14 PROCEDURE — 86920 COMPATIBILITY TEST SPIN: CPT

## 2019-01-14 PROCEDURE — P9016 RBC LEUKOCYTES REDUCED: HCPCS

## 2019-01-14 PROCEDURE — 85730 THROMBOPLASTIN TIME PARTIAL: CPT | Performed by: EMERGENCY MEDICINE

## 2019-01-14 PROCEDURE — 80053 COMPREHEN METABOLIC PANEL: CPT | Performed by: EMERGENCY MEDICINE

## 2019-01-14 PROCEDURE — 99223 1ST HOSP IP/OBS HIGH 75: CPT | Performed by: FAMILY MEDICINE

## 2019-01-14 PROCEDURE — 36415 COLL VENOUS BLD VENIPUNCTURE: CPT

## 2019-01-14 PROCEDURE — 86900 BLOOD TYPING SEROLOGIC ABO: CPT

## 2019-01-14 RX ORDER — POTASSIUM CHLORIDE 7.45 MG/ML
10 INJECTION INTRAVENOUS
Status: DISCONTINUED | OUTPATIENT
Start: 2019-01-14 | End: 2019-01-16 | Stop reason: HOSPADM

## 2019-01-14 RX ORDER — DIPHENOXYLATE HYDROCHLORIDE AND ATROPINE SULFATE 2.5; .025 MG/1; MG/1
1 TABLET ORAL DAILY
Status: DISCONTINUED | OUTPATIENT
Start: 2019-01-15 | End: 2019-01-16 | Stop reason: HOSPADM

## 2019-01-14 RX ORDER — LORAZEPAM 2 MG/ML
2 INJECTION INTRAMUSCULAR
Status: DISCONTINUED | OUTPATIENT
Start: 2019-01-14 | End: 2019-01-16 | Stop reason: HOSPADM

## 2019-01-14 RX ORDER — ONDANSETRON 2 MG/ML
4 INJECTION INTRAMUSCULAR; INTRAVENOUS EVERY 6 HOURS PRN
Status: DISCONTINUED | OUTPATIENT
Start: 2019-01-14 | End: 2019-01-16 | Stop reason: HOSPADM

## 2019-01-14 RX ORDER — ONDANSETRON 4 MG/1
4 TABLET, FILM COATED ORAL EVERY 6 HOURS PRN
Status: DISCONTINUED | OUTPATIENT
Start: 2019-01-14 | End: 2019-01-16 | Stop reason: HOSPADM

## 2019-01-14 RX ORDER — PANTOPRAZOLE SODIUM 40 MG/10ML
40 INJECTION, POWDER, LYOPHILIZED, FOR SOLUTION INTRAVENOUS EVERY 12 HOURS SCHEDULED
Status: DISCONTINUED | OUTPATIENT
Start: 2019-01-14 | End: 2019-01-16 | Stop reason: HOSPADM

## 2019-01-14 RX ORDER — MAGNESIUM SULFATE HEPTAHYDRATE 40 MG/ML
4 INJECTION, SOLUTION INTRAVENOUS AS NEEDED
Status: DISCONTINUED | OUTPATIENT
Start: 2019-01-14 | End: 2019-01-16 | Stop reason: HOSPADM

## 2019-01-14 RX ORDER — BISACODYL 10 MG
10 SUPPOSITORY, RECTAL RECTAL DAILY PRN
Status: DISCONTINUED | OUTPATIENT
Start: 2019-01-14 | End: 2019-01-16 | Stop reason: HOSPADM

## 2019-01-14 RX ORDER — ACETAMINOPHEN 325 MG/1
650 TABLET ORAL EVERY 6 HOURS PRN
Status: DISCONTINUED | OUTPATIENT
Start: 2019-01-14 | End: 2019-01-16 | Stop reason: HOSPADM

## 2019-01-14 RX ORDER — PANTOPRAZOLE SODIUM 40 MG/10ML
80 INJECTION, POWDER, LYOPHILIZED, FOR SOLUTION INTRAVENOUS ONCE
Status: COMPLETED | OUTPATIENT
Start: 2019-01-14 | End: 2019-01-14

## 2019-01-14 RX ORDER — SODIUM CHLORIDE 0.9 % (FLUSH) 0.9 %
3 SYRINGE (ML) INJECTION EVERY 12 HOURS SCHEDULED
Status: DISCONTINUED | OUTPATIENT
Start: 2019-01-14 | End: 2019-01-16 | Stop reason: HOSPADM

## 2019-01-14 RX ORDER — POTASSIUM CHLORIDE 750 MG/1
40 CAPSULE, EXTENDED RELEASE ORAL AS NEEDED
Status: DISCONTINUED | OUTPATIENT
Start: 2019-01-14 | End: 2019-01-16 | Stop reason: HOSPADM

## 2019-01-14 RX ORDER — SODIUM CHLORIDE 0.9 % (FLUSH) 0.9 %
10 SYRINGE (ML) INJECTION AS NEEDED
Status: DISCONTINUED | OUTPATIENT
Start: 2019-01-14 | End: 2019-01-16 | Stop reason: HOSPADM

## 2019-01-14 RX ORDER — BISACODYL 5 MG/1
5 TABLET, DELAYED RELEASE ORAL DAILY PRN
Status: DISCONTINUED | OUTPATIENT
Start: 2019-01-14 | End: 2019-01-16 | Stop reason: HOSPADM

## 2019-01-14 RX ORDER — LORAZEPAM 2 MG/ML
1 INJECTION INTRAMUSCULAR
Status: DISCONTINUED | OUTPATIENT
Start: 2019-01-14 | End: 2019-01-16 | Stop reason: HOSPADM

## 2019-01-14 RX ORDER — LORAZEPAM 1 MG/1
1 TABLET ORAL
Status: DISCONTINUED | OUTPATIENT
Start: 2019-01-14 | End: 2019-01-16 | Stop reason: HOSPADM

## 2019-01-14 RX ORDER — LORAZEPAM 1 MG/1
2 TABLET ORAL
Status: DISCONTINUED | OUTPATIENT
Start: 2019-01-14 | End: 2019-01-16 | Stop reason: HOSPADM

## 2019-01-14 RX ORDER — THIAMINE MONONITRATE (VIT B1) 100 MG
100 TABLET ORAL DAILY
Status: DISCONTINUED | OUTPATIENT
Start: 2019-01-15 | End: 2019-01-16 | Stop reason: HOSPADM

## 2019-01-14 RX ORDER — POTASSIUM CHLORIDE 1.5 G/1.77G
40 POWDER, FOR SOLUTION ORAL AS NEEDED
Status: DISCONTINUED | OUTPATIENT
Start: 2019-01-14 | End: 2019-01-16 | Stop reason: HOSPADM

## 2019-01-14 RX ORDER — SODIUM CHLORIDE 0.9 % (FLUSH) 0.9 %
3-10 SYRINGE (ML) INJECTION AS NEEDED
Status: DISCONTINUED | OUTPATIENT
Start: 2019-01-14 | End: 2019-01-16 | Stop reason: HOSPADM

## 2019-01-14 RX ORDER — FOLIC ACID 1 MG/1
1 TABLET ORAL DAILY
Status: DISCONTINUED | OUTPATIENT
Start: 2019-01-15 | End: 2019-01-16 | Stop reason: HOSPADM

## 2019-01-14 RX ORDER — MAGNESIUM SULFATE HEPTAHYDRATE 40 MG/ML
2 INJECTION, SOLUTION INTRAVENOUS AS NEEDED
Status: DISCONTINUED | OUTPATIENT
Start: 2019-01-14 | End: 2019-01-16 | Stop reason: HOSPADM

## 2019-01-14 RX ADMIN — LORAZEPAM 1 MG: 1 TABLET ORAL at 20:01

## 2019-01-14 RX ADMIN — PANTOPRAZOLE SODIUM 40 MG: 40 INJECTION, POWDER, FOR SOLUTION INTRAVENOUS at 20:02

## 2019-01-14 RX ADMIN — LORAZEPAM 2 MG: 1 TABLET ORAL at 22:22

## 2019-01-14 RX ADMIN — FOLIC ACID 100 ML/HR: 5 INJECTION, SOLUTION INTRAMUSCULAR; INTRAVENOUS; SUBCUTANEOUS at 23:02

## 2019-01-14 RX ADMIN — PANTOPRAZOLE SODIUM 80 MG: 40 INJECTION, POWDER, FOR SOLUTION INTRAVENOUS at 16:17

## 2019-01-14 RX ADMIN — POTASSIUM CHLORIDE 40 MEQ: 750 CAPSULE, EXTENDED RELEASE ORAL at 23:54

## 2019-01-14 RX ADMIN — POTASSIUM CHLORIDE 40 MEQ: 750 CAPSULE, EXTENDED RELEASE ORAL at 20:01

## 2019-01-14 RX ADMIN — MUPIROCIN: 20 OINTMENT TOPICAL at 20:01

## 2019-01-14 NOTE — PAYOR COMM NOTE
"Tavo Portillo (38 y.o. Male)     Date of Birth Social Security Number Address Home Phone MRN    1980  [address] [place] [address] [phone] [id number]    Buddhist Marital Status          [facility] Single       Admission Date Admission Type Admitting Provider Attending Provider Department, Room/Bed    19 Emergency Sandra Mckeon MD Beck, Jennifer L, MD [facility],     Discharge Date Discharge Disposition Discharge Destination                       Attending Provider:  Sandra Mckeon MD    Allergies:  No Known Allergies    Isolation:  None   Infection:  None   Code Status:  CPR    Ht:  185.4 cm (73\")   Wt:  76.7 kg (169 lb)    Admission Cmt:  None   Principal Problem:  Symptomatic anemia [D64.9]                 Active Insurance as of 2019     Primary Coverage     Payor Plan Insurance Group Employer/Plan Group    [employer] [place] [employer]      Payor Plan Address Payor Plan Phone Number Payor Plan Fax Number Effective Dates    [address] [address] [phone]  10/7/2018 - None Entered    [facility] [id number]       Subscriber Name Subscriber Birth Date Member ID       TAVO PORTILLO 1980 [id number]                 Emergency Contacts      (Rel.) Home Phone Work Phone Mobile Phone    Beth Garcia (Sister) [phone] -- --    AveryCrystal valdes (Mother) -- -- [phone]    Lianne Hartley (Significant Other) -- -- [phone]            Insurance Information                [facility]/WELLCARE MEDICAID Phone: [phone]    Subscriber: AverylucioTavo CORINA Subscriber#: [id number]    Group#:  Precert#:              History & Physical      Sandra Mckeon MD at 2019  4:31 PM              [facility] Medicine Services  HISTORY AND PHYSICAL    Patient Name: Tavo Portillo  : 1980  MRN: [id number]  Primary Care Physician: Duc Pena MD  Date of admission: 2019      Subjective "   Subjective     Chief Complaint:  Fatigue      HPI:  Tavo Murrieta is a 38 y.o. male with history of alcohol abuse and pancreatitis.  He was admitted at [facility] in October 2018 with GI bleed.  He had an EGD and colonoscopy during that admission that showed evidence of recent bleed in the stomach but no source of bleeding.  He was transfused with 4 units of PRBC's.  Patient was discharged to an alcohol rehabilitation program and missed his 1 week follow up.  He complained of melena and was sent for labs and ultimately admitted to [facility] in late November.  He reports EGD at that time showed hiatel hernia and two small ulcers.  He did not have any follow up after that hospitalization.  Patient presents to [facility] ED today with c/o worsening fatigue over the past month.  He also endorsed dyspnea on exertion, lightheadedness without any syncopal episodes, palpitations and intermitted sharp epigastric pains.  Patient also complains of ears feeling congested, low grade fever, and non productive cough.  He denies melena, chest pain, leg swelling, sore throat or other acute symptoms.  Patient reports that he use to drink a pint of vodka daily.  He states that he drinks about 3-4 beers a day and his last drink was last night.  His parents were present during history and exam.  In ED, labs revealed hemoglobin 4.0, hematocrit 16.7, sodium 130, potassium 3.5, ethanal 122, lipase 54.  Hepatitis panel negative.  He will be admitted to the hospital medicine service for further evaluation and management.       Review of Systems   Constitutional: Positive for fatigue and fever. Negative for appetite change and chills.   HENT: Positive for congestion, ear pain and rhinorrhea. Negative for mouth sores.         Lesion in right nares   Respiratory: Positive for cough and shortness of breath. Negative for choking, chest tightness and wheezing.    Cardiovascular: Positive for palpitations. Negative for chest pain and leg  swelling.   Gastrointestinal: Positive for abdominal pain. Negative for blood in stool, constipation, diarrhea, nausea and vomiting.   Genitourinary: Negative.    Musculoskeletal: Negative.    Skin: Positive for pallor.   Neurological: Positive for weakness and light-headedness. Negative for seizures and syncope.   Psychiatric/Behavioral: Negative.       Otherwise 10-system ROS reviewed and is negative except as mentioned in the HPI.    Personal History     Past Medical History:   Diagnosis Date   • Alcohol abuse    • Seizure due to alcohol withdrawal (CMS/HCC)    • Seizures (CMS/HCC)        Past Surgical History:   Procedure Laterality Date   • COLONOSCOPY N/A 10/8/2018    Procedure: COLONOSCOPY;  Surgeon: Parish Owen MD;  Location: [facility];  Service: Gastroenterology   • ENDOSCOPY N/A 10/8/2018    Procedure: ESOPHAGOGASTRODUODENOSCOPY;  Surgeon: Parish Owen MD;  Location: [facility];  Service: Gastroenterology       Family History: family history includes Hypertension in his mother. Otherwise pertinent FHx was reviewed and unremarkable.     Social History:  reports that  has never smoked. His smokeless tobacco use includes snuff. He reports that he drinks alcohol. He reports that he does not use drugs.  Social History     Social History Narrative    Hospitalization [facility] 10/2018    [facility]       Medications:    Available home medication information reviewed.    (Not in a hospital admission)    No Known Allergies    Objective   Objective     Vital Signs:   Temp:  [97.8 °F (36.6 °C)-98.4 °F (36.9 °C)] 97.8 °F (36.6 °C)  Heart Rate:  [102-119] 102  Resp:  [14-16] 16  BP: (115-132)/(72-85) 115/72        Physical Exam   Constitutional: No acute distress, awake, alert, pale  Eyes: PERRLA, sclerae anicteric, conjunctiva pale  HENT: NCAT, mucous membranes moist, small lesion in right nares  Neck: Supple, no thyromegaly, no lymphadenopathy, trachea midline  Respiratory: Clear to  auscultation bilaterally, nonlabored respirations   Cardiovascular: RRR, + gallop  Gastrointestinal: Positive bowel sounds, soft, nontender, nondistended  Musculoskeletal: No bilateral ankle edema, no clubbing or cyanosis to extremities  Psychiatric: Appropriate affect, cooperative  Neurologic: Oriented x 3, strength symmetric in all extremities, Cranial Nerves grossly intact to confrontation, speech clear  Skin: No rashes    Results Reviewed:  I have personally reviewed current lab, radiology, and data and agree.    Results from last 7 days   Lab Units  01/14/19   1314   WBC 10*3/mm3  5.88   HEMOGLOBIN g/dL  4.0*   HEMATOCRIT %  16.7*   PLATELETS 10*3/mm3  259   INR   1.32*     Results from last 7 days   Lab Units  01/14/19   1314   SODIUM mmol/L  130*   POTASSIUM mmol/L  3.5   CHLORIDE mmol/L  100   CO2 mmol/L  23.0   BUN mg/dL  6*   CREATININE mg/dL  0.65   GLUCOSE mg/dL  124*   CALCIUM mg/dL  8.6*   ALT (SGPT) U/L  19   AST (SGOT) U/L  20     Estimated Creatinine Clearance: 165.5 mL/min (by C-G formula based on SCr of 0.65 mg/dL).  Brief Urine Lab Results  (Last result in the past 365 days)      Color   Clarity   Blood   Leuk Est   Nitrite   Protein   CREAT   Urine HCG        10/07/18 1401 Dark Yellow Clear Negative Trace Negative 30 mg/dL (1+)             No results found for: BNP  Imaging Results (last 24 hours)     Procedure Component Value Units Date/Time    XR Chest 2 View [[id number]] Collected:  01/14/19 1450     Updated:  01/14/19 1618    Narrative:       EXAMINATION: XR CHEST 2 VW-01/14/2019:      INDICATION: Weak.      COMPARISON: NONE.     FINDINGS: The heart size is normal. There is no pulmonary inflammatory  process. There is no mass or effusion.           Impression:       No active disease.     D:  01/14/2019  E:  01/14/2019     This report was finalized on 1/14/2019 4:16 PM by Dr. Roc Ruiz MD.           Results for orders placed during the hospital encounter of 10/07/18   Adult Transthoracic  Echo Complete W/ Cont if Necessary Per Protocol    Narrative · Left ventricular systolic function is normal. Estimated EF appears to be   in the range of 61 - 65%. Normal left ventricular cavity size and wall   thickness noted. Left ventricular diastolic function is normal.     No significant valvular heart disease noted.  Essentially normal cardiac structure and function.         Assessment/Plan   Assessment / Plan     Active Hospital Problems    Diagnosis Date Noted   • **Symptomatic anemia [D64.9] 01/14/2019   • Alcoholism /alcohol abuse (CMS/HCC) [F10.20] 01/14/2019   • Hyponatremia [E87.1] 01/14/2019   • history of Seizure due to alcohol withdrawal (CMS/[facility]) [F10.239, R56.9] 01/14/2019   • Viral upper respiratory tract infection [J06.9] 01/14/2019         Symptomatic anemia/history of GI bleed/ulcers  --hemoglobin 4.0  --transfuse 2 units PRBC, will likely need 2 more.  --IV protonix  --consult GI for AM  --NPO after midnight for possible EGD  --monitor    Alcohol abuse (patient with history of withdrawal seizure)  --[facility] protocol  --consult  for outpatient rehab options at discharge  --At high risk for complications due to history of alcohol withdrawal seizure, monitor closely.    Viral URI  --bactroban to lesion in right nare  --supportive care    Hyponatremia   --sodium 130  --monitor    DVT prophylaxis:  mechanical    CODE STATUS:    Code Status and Medical Interventions:   Ordered at: 01/14/19 1706     Code Status:    CPR     Medical Interventions (Level of Support Prior to Arrest):    Full       Admission Status:  I believe this patient meets INPATIENT status due to the need for care which can only be reasonably provided in an hospital setting such as possible need for aggressive/expedited ancillary services and/or consultation services, IV medications, close physician monitoring, and/or procedures.  In such, I feel patient’s risk for adverse outcomes and need for care warrant INPATIENT  evaluation and predict the patient’s care encounter to likely last beyond 2 midnights.      Electronically signed by JONATHAN Ellis, 01/14/19, 4:31 PM.      Brief Attending Admission Attestation     I have seen and examined the patient, performing an independent face-to-face diagnostic evaluation with plan of care reviewed and developed with the advanced practice clinician JONATHAN Pickard.      Brief Summary Statement/HPI:   Tavo Murrieta is a 38 y.o. male with pMH significant for alcohol abuse, GI bleeding and pancreatitis.  He presented to the [facility] ER today with increasing weakness, lightheadedness, palpitations, and dyspnea on exertion.  I note that he was hospitalized at [place] in October 2018 and egd report at that time showed evidence of recent bleeding but no obvious source.  He was transfused 4 units of PRBC's and discharged to [facility] for alcohol rehab.  He missed his follow up appointment due to inpatient rehab and after experiencing melena and increasing weakness was admitted to [facility].  He was transfused there and egd report per family showed 2 small gastric ulcers.  He states his Hgb was around 8 at discharge and he has been increasingly weak since that time.    The patient used to drink a pint of vodka per day and now states that he consumes 3-4 beers per day.  Parents are in the room.    He does have a history of alcohol withdrawal seizure.    He denies melena, hematochezia or hematemesis.  His FOBT was negative here in the ER.  He complains of intermittent and severe upper abdominal and back pain.  No N/V/D.  No syncope.  No chest pain.  No LE edema.  He reports his kids have been sick with a virus and the patient has also had ear pressure, sore throat, non productive cough and occasional fever (up to 100.8).      Here in the ER, the patient is afebrile, -116, normotensive.  H/H 4/16.7, ethanol 122, INR 1.32, LFT's wnl, lipase 54, sodium 130.  CXR negative.  UA negative.  UDS negative.       Attending Physical Exam:  Constitutional: No acute distress, awake, alert  Eyes: PERRLA, sclerae anicteric, no conjunctival injection  HENT: NCAT, mucous membranes moist  Neck: Supple, no thyromegaly, no lymphadenopathy, trachea midline  Respiratory: Clear to auscultation bilaterally, nonlabored respirations   Cardiovascular: Regular, mildly tachy, +  gallop, palpable pedal pulses bilaterally  Gastrointestinal: Positive bowel sounds, soft, nontender, nondistended  Musculoskeletal: No bilateral ankle edema, no clubbing or cyanosis to extremities  Psychiatric: Appropriate affect, cooperative  Neurologic: Oriented x 3, strength symmetric in all extremities, Cranial Nerves grossly intact to confrontation, speech clear  Skin: Very pale        Brief Assessment/Plan :  See above for further detailed assessment and plan developed with APC which I have reviewed and/or edited.      Electronically signed by Sandra Mckeon MD, 01/14/19, 4:58 PM.           Electronically signed by Sandra Mckeon MD at 1/14/2019  5:07 PM       Hospital Medications (all)       Dose Frequency Start End    multiple vitamin (M.V.I. Adult) 10 mL, thiamine (B-1) 100 mg, folic acid 1 mg, magnesium sulfate 2 g in Sodium chloride 0.9 % 1,000 mL infusion 1,000 mL/hr Once 1/14/2019     Sig - Route: Infuse 1,000 mL/hr into a venous catheter 1 (One) Time. - Intravenous    pantoprazole (PROTONIX) injection 80 mg 80 mg Once 1/14/2019 1/14/2019    Sig - Route: Infuse 20 mL into a venous catheter 1 (One) Time. - Intravenous    sodium chloride 0.9 % flush 10 mL 10 mL As Needed 1/14/2019     Sig - Route: Infuse 10 mL into a venous catheter As Needed for Line Care. - Intravenous    Cosign for Ordering: Required by Fam Ríos MD          Orders (last 24 hrs)     Start     Ordered    01/14/19 3649  Obtain Medical Records  Until Discontinued     Comments:  [facility] recent hospitalization for GI bleed/anemia, discharge summary, consult notes and endoscopy  reports.    01/14/19 1657    01/14/19 1639  Code Status and Medical Interventions:  Continuous      01/14/19 1646    01/14/19 1550  multiple vitamin (M.V.I. Adult) 10 mL, thiamine (B-1) 100 mg, folic acid 1 mg, magnesium sulfate 2 g in Sodium chloride 0.9 % 1,000 mL infusion  Once      01/14/19 1548    01/14/19 1550  Inpatient Admission  Once      01/14/19 1549    01/14/19 1549  Critical Care  Once     Comments:  This order was created via procedure documentation    01/14/19 1548    01/14/19 1549  Tele Bed Request  Once      01/14/19 1548    01/14/19 1536  POCT Occult Blood, stool  Once      01/14/19 1535    01/14/19 1504  pantoprazole (PROTONIX) injection 80 mg  Once      01/14/19 1502    01/14/19 1503  Obtain Medical Records (Specify)  Once     Comments:  LAST TOMA ANAND SUMS    01/14/19 1502    01/14/19 1501  Verify Informed Consent  Once      01/14/19 1501    01/14/19 1501  Prepare RBC, 2 Units  Blood - Once      01/14/19 1501    01/14/19 1438  Scan Slide  Once      01/14/19 1437    01/14/19 1400  CBC Auto Differential  STAT      01/14/19 1359    01/14/19 1318  Type & Screen  STAT      01/14/19 1317    01/14/19 1317  XR Chest 2 View  1 Time Imaging      01/14/19 1316    01/14/19 1317  Ethanol  Once      01/14/19 1316    01/14/19 1317  Acetaminophen Level  Once      01/14/19 1316    01/14/19 1317  Urine Drug Screen - Urine, Clean Catch  Once      01/14/19 1316    01/14/19 1317  Urinalysis With Microscopic If Indicated (No Culture) - Urine, Clean Catch  Once      01/14/19 1316    01/14/19 1316  CBC & Differential  Once      01/14/19 1316    01/14/19 1316  Comprehensive Metabolic Panel  Once      01/14/19 1316    01/14/19 1316  Lipase  Once      01/14/19 1316    01/14/19 1316  Protime-INR  Once      01/14/19 1316    01/14/19 1316  aPTT  Once      01/14/19 1316    01/14/19 1316  Hepatitis Panel, Acute  Once      01/14/19 1316    01/14/19 1316  CBC Auto Differential  PROCEDURE ONCE      01/14/19 1432     01/14/19 1314  Insert peripheral IV  Once      01/14/19 1314    01/14/19 1314  New Point Draw  Once      01/14/19 1314    01/14/19 1314  Light Blue Top  PROCEDURE ONCE      01/14/19 1314    01/14/19 1314  Green Top (Gel)  PROCEDURE ONCE      01/14/19 1314    01/14/19 1314  Lavender Top  PROCEDURE ONCE      01/14/19 1314    01/14/19 1314  Gold Top - SST  PROCEDURE ONCE      01/14/19 1314    01/14/19 1314  Green Top (No Gel)  PROCEDURE ONCE,   Status:  Canceled      01/14/19 1314    01/14/19 1313  sodium chloride 0.9 % flush 10 mL  As Needed      01/14/19 1314    Unscheduled  Transfuse RBC, 2 Units Infuse Each Unit Over: 2H  Transfusion      01/14/19 1501    Signed and Held  Safety Precautions  Continuous      Signed and Held    Signed and Held  Intake & Output  Every Shift      Signed and Held    Signed and Held  Weigh patient  Once      Signed and Held    Signed and Held  Obtain Baseline Clinical Frostproof Withdrawal Assessment - Ar, Sedation Scale & Vital Signs  Once     Comments:  Follow CIWA Scoring Reference Guide    Signed and Held    Signed and Held  Clinical Frostproof Withdrawal Assessment (CIWA-Ar)  Per Hospital Policy      Signed and Held    Signed and Held  If CIWA Score < 8 Monitor q4 hours x 24 hours And Then Discontinue Assessment- Restart Scoring Assessment & Protocol if Symptoms Reappear  Continuous      Signed and Held    Signed and Held  Obtain Pre & Post Sedation Scores With Every Lorazepam Dose - Hold For POSS Greater Than 2 or RASS of -3 or Less  Continuous      Signed and Held    Signed and Held  Seizure Precautions  Continuous      Signed and Held    Signed and Held  Notify Provider - Withdrawal  Until Discontinued     Comments:  Including: anxiety, agitation, severe vomiting, seizure activity.    Signed and Held    Signed and Held  Notify Provider of Abnormal Lab Results  Until Discontinued      Signed and Held    Signed and Held  Notify Provider - Vitals  Until Discontinued      Signed and Held     Signed and Held  Oxygen Therapy- Nasal Cannula; Titrate for SPO2: 90%  Continuous      Signed and Held    Signed and Held  Magnesium  As Needed      Signed and Held    Signed and Held  Potassium  As Needed      Signed and Held    Signed and Held  Insert Peripheral IV  Once      Signed and Held    Signed and Held  Saline Lock & Maintain IV Access  Continuous      Signed and Held    Signed and Held  sodium chloride 0.9 % flush 3 mL  Every 12 Hours Scheduled      Signed and Held    Signed and Held  sodium chloride 0.9 % flush 3-10 mL  As Needed      Signed and Held    Signed and Held  Place Sequential Compression Device  Once      Signed and Held    Signed and Held  Maintain Sequential Compression Device  Continuous      Signed and Held    Signed and Held  Vital Signs  Every 4 Hours     Comments:  Per per hospital policy    Signed and Held    Signed and Held  Pulse Oximetry, Continuous  Continuous      Signed and Held    Signed and Held  Continuous Pulse Oximetry  Continuous      Signed and Held    Signed and Held  Diet Regular  Diet Effective Now      Signed and Held    Signed and Held  NPO Diet  Diet Effective Midnight      Signed and Held    Signed and Held  Inpatient Case Management  Consult  Once     Provider:  (Not yet assigned)    Signed and Held    Signed and Held  CBC (No Diff)  Morning Draw      Signed and Held    Signed and Held  Comprehensive Metabolic Panel  Morning Draw      Signed and Held    Signed and Held  Magnesium  Morning Draw      Signed and Held    Signed and Held  Lipase  Morning Draw      Signed and Held    Signed and Held  LORazepam (ATIVAN) tablet 1 mg  Every 2 Hours PRN      Signed and Held    Signed and Held  LORazepam (ATIVAN) injection 1 mg  Every 2 Hours PRN      Signed and Held    Signed and Held  LORazepam (ATIVAN) tablet 2 mg  Every 1 Hour PRN      Signed and Held    Signed and Held  LORazepam (ATIVAN) injection 2 mg  Every 1 Hour PRN      Signed and Held    Signed  and Held  LORazepam (ATIVAN) injection 2 mg  Every 15 Minutes PRN      Signed and Held    Signed and Held  LORazepam (ATIVAN) injection 2 mg  Every 15 Minutes PRN      Signed and Held    Signed and Held  acetaminophen (TYLENOL) tablet 650 mg  Every 6 Hours PRN      Signed and Held    Signed and Held  potassium chloride (MICRO-K) CR capsule 40 mEq  As Needed      Signed and Held    Signed and Held  potassium chloride (KLOR-CON) packet 40 mEq  As Needed      Signed and Held    Signed and Held  potassium chloride 10 mEq in 100 mL IVPB  Every 1 Hour PRN      Signed and Held    Signed and Held  Magnesium Sulfate 2 gram Bolus, followed by 8 gram infusion (total Mg dose 10 grams)- Mg less than or equal to 1mg/dL  As Needed      Signed and Held    Signed and Held  Magnesium Sulfate 2 gram / 50mL Infusion (GIVE X 3 BAGS TO EQUAL 6GM TOTAL DOSE) - Mg 1.1 - 1.5 mg/dl  As Needed      Signed and Held    Signed and Held  Magnesium Sulfate 4 gram infusion- Mg 1.6-1.9 mg/dL  As Needed      Signed and Held    Signed and Held  bisacodyl (DULCOLAX) EC tablet 5 mg  Daily PRN      Signed and Held    Signed and Held  bisacodyl (DULCOLAX) suppository 10 mg  Daily PRN      Signed and Held    Signed and Held  ondansetron (ZOFRAN) tablet 4 mg  Every 6 Hours PRN      Signed and Held    Signed and Held  ondansetron (ZOFRAN) injection 4 mg  Every 6 Hours PRN      Signed and Held    Signed and Held  thiamine (VITAMIN B-1) tablet 100 mg  Daily      Signed and Held    Signed and Held  multivitamin (THERAGRAN) tablet 1 tablet  Daily      Signed and Held    Signed and Held  folic acid (FOLVITE) tablet 1 mg  Daily      Signed and Held    Signed and Held  pantoprazole (PROTONIX) injection 40 mg  Every 12 Hours Scheduled      Signed and Held    Signed and Held  Inpatient Gastroenterology Consult  IN      Specialty:  Gastroenterology  Provider:  Brunner, Mark I, MD    Signed and Held    Signed and Held  mupirocin (BACTROBAN) 2 % ointment  Every 12  Hours Scheduled      Signed and Held

## 2019-01-14 NOTE — H&P
[facility] Medicine Services  HISTORY AND PHYSICAL    Patient Name: Tavo Murrieta  : 1980  MRN: [id number]  Primary Care Physician: Duc Pena MD  Date of admission: 2019      Subjective   Subjective     Chief Complaint:  Fatigue      HPI:  Tavo Murireta is a 38 y.o. male with history of alcohol abuse and pancreatitis.  He was admitted at [facility] in 2018 with GI bleed.  He had an EGD and colonoscopy during that admission that showed evidence of recent bleed in the stomach but no source of bleeding.  He was transfused with 4 units of PRBC's.  Patient was discharged to an alcohol rehabilitation program and missed his 1 week follow up.  He complained of melena and was sent for labs and ultimately admitted to [facility] in late November.  He reports EGD at that time showed hiatel hernia and two small ulcers.  He did not have any follow up after that hospitalization.  Patient presents to [facility] ED today with c/o worsening fatigue over the past month.  He also endorsed dyspnea on exertion, lightheadedness without any syncopal episodes, palpitations and intermitted sharp epigastric pains.  Patient also complains of ears feeling congested, low grade fever, and non productive cough.  He denies melena, chest pain, leg swelling, sore throat or other acute symptoms.  Patient reports that he use to drink a pint of vodka daily.  He states that he drinks about 3-4 beers a day and his last drink was last night.  His parents were present during history and exam.  In ED, labs revealed hemoglobin 4.0, hematocrit 16.7, sodium 130, potassium 3.5, ethanal 122, lipase 54.  Hepatitis panel negative.  He will be admitted to the hospital medicine service for further evaluation and management.       Review of Systems   Constitutional: Positive for fatigue and fever. Negative for appetite change and chills.   HENT: Positive for congestion, ear pain and rhinorrhea. Negative for  mouth sores.         Lesion in right nares   Respiratory: Positive for cough and shortness of breath. Negative for choking, chest tightness and wheezing.    Cardiovascular: Positive for palpitations. Negative for chest pain and leg swelling.   Gastrointestinal: Positive for abdominal pain. Negative for blood in stool, constipation, diarrhea, nausea and vomiting.   Genitourinary: Negative.    Musculoskeletal: Negative.    Skin: Positive for pallor.   Neurological: Positive for weakness and light-headedness. Negative for seizures and syncope.   Psychiatric/Behavioral: Negative.       Otherwise 10-system ROS reviewed and is negative except as mentioned in the HPI.    Personal History     Past Medical History:   Diagnosis Date   • Alcohol abuse    • Seizure due to alcohol withdrawal (CMS/HCC)    • Seizures (CMS/HCC)        Past Surgical History:   Procedure Laterality Date   • COLONOSCOPY N/A 10/8/2018    Procedure: COLONOSCOPY;  Surgeon: Parish Owen MD;  Location: [facility];  Service: Gastroenterology   • ENDOSCOPY N/A 10/8/2018    Procedure: ESOPHAGOGASTRODUODENOSCOPY;  Surgeon: Parish Owen MD;  Location: [facility];  Service: Gastroenterology       Family History: family history includes Hypertension in his mother. Otherwise pertinent FHx was reviewed and unremarkable.     Social History:  reports that  has never smoked. His smokeless tobacco use includes snuff. He reports that he drinks alcohol. He reports that he does not use drugs.  Social History     Social History Narrative    Hospitalization [facility] 10/2018    Hospitalization [facility] 11/2018       Medications:    Available home medication information reviewed.    (Not in a hospital admission)    No Known Allergies    Objective   Objective     Vital Signs:   Temp:  [97.8 °F (36.6 °C)-98.4 °F (36.9 °C)] 97.8 °F (36.6 °C)  Heart Rate:  [102-119] 102  Resp:  [14-16] 16  BP: (115-132)/(72-85) 115/72        Physical Exam   Constitutional: No acute distress,  awake, alert, pale  Eyes: PERRLA, sclerae anicteric, conjunctiva pale  HENT: NCAT, mucous membranes moist, small lesion in right nares  Neck: Supple, no thyromegaly, no lymphadenopathy, trachea midline  Respiratory: Clear to auscultation bilaterally, nonlabored respirations   Cardiovascular: RRR, + gallop  Gastrointestinal: Positive bowel sounds, soft, nontender, nondistended  Musculoskeletal: No bilateral ankle edema, no clubbing or cyanosis to extremities  Psychiatric: Appropriate affect, cooperative  Neurologic: Oriented x 3, strength symmetric in all extremities, Cranial Nerves grossly intact to confrontation, speech clear  Skin: No rashes    Results Reviewed:  I have personally reviewed current lab, radiology, and data and agree.    Results from last 7 days   Lab Units  01/14/19   1314   WBC 10*3/mm3  5.88   HEMOGLOBIN g/dL  4.0*   HEMATOCRIT %  16.7*   PLATELETS 10*3/mm3  259   INR   1.32*     Results from last 7 days   Lab Units  01/14/19   1314   SODIUM mmol/L  130*   POTASSIUM mmol/L  3.5   CHLORIDE mmol/L  100   CO2 mmol/L  23.0   BUN mg/dL  6*   CREATININE mg/dL  0.65   GLUCOSE mg/dL  124*   CALCIUM mg/dL  8.6*   ALT (SGPT) U/L  19   AST (SGOT) U/L  20     Estimated Creatinine Clearance: 165.5 mL/min (by C-G formula based on SCr of 0.65 mg/dL).  Brief Urine Lab Results  (Last result in the past 365 days)      Color   Clarity   Blood   Leuk Est   Nitrite   Protein   CREAT   Urine HCG        10/07/18 1401 Dark Yellow Clear Negative Trace Negative 30 mg/dL (1+)             No results found for: BNP  Imaging Results (last 24 hours)     Procedure Component Value Units Date/Time    XR Chest 2 View [[id number]] Collected:  01/14/19 1450     Updated:  01/14/19 1618    Narrative:       EXAMINATION: XR CHEST 2 VW-01/14/2019:      INDICATION: Weak.      COMPARISON: NONE.     FINDINGS: The heart size is normal. There is no pulmonary inflammatory  process. There is no mass or effusion.           Impression:       No  active disease.     D:  01/14/2019  E:  01/14/2019     This report was finalized on 1/14/2019 4:16 PM by Dr. Roc Ruiz MD.           Results for orders placed during the hospital encounter of 10/07/18   Adult Transthoracic Echo Complete W/ Cont if Necessary Per Protocol    Narrative · Left ventricular systolic function is normal. Estimated EF appears to be   in the range of 61 - 65%. Normal left ventricular cavity size and wall   thickness noted. Left ventricular diastolic function is normal.     No significant valvular heart disease noted.  Essentially normal cardiac structure and function.         Assessment/Plan   Assessment / Plan     Active Hospital Problems    Diagnosis Date Noted   • **Symptomatic anemia [D64.9] 01/14/2019   • Alcoholism /alcohol abuse (CMS/HCC) [F10.20] 01/14/2019   • Hyponatremia [E87.1] 01/14/2019   • history of Seizure due to alcohol withdrawal (CMS/[facility]) [F10.239, R56.9] 01/14/2019   • Viral upper respiratory tract infection [J06.9] 01/14/2019         Symptomatic anemia/history of GI bleed/ulcers  --hemoglobin 4.0  --transfuse 2 units PRBC, will likely need 2 more.  --IV protonix  --consult GI for AM  --NPO after midnight for possible EGD  --monitor    Alcohol abuse (patient with history of withdrawal seizure)  --CIWA protocol  --consult  for outpatient rehab options at discharge  --At high risk for complications due to history of alcohol withdrawal seizure, monitor closely.    Viral URI  --bactroban to lesion in right nare  --supportive care    Hyponatremia   --sodium 130  --monitor    DVT prophylaxis:  mechanical    CODE STATUS:    Code Status and Medical Interventions:   Ordered at: 01/14/19 1646     Code Status:    CPR     Medical Interventions (Level of Support Prior to Arrest):    Full       Admission Status:  I believe this patient meets INPATIENT status due to the need for care which can only be reasonably provided in an hospital setting such as possible need for  aggressive/expedited ancillary services and/or consultation services, IV medications, close physician monitoring, and/or procedures.  In such, I feel patient’s risk for adverse outcomes and need for care warrant INPATIENT evaluation and predict the patient’s care encounter to likely last beyond 2 midnights.      Electronically signed by JONATHAN Ellis, 01/14/19, 4:31 PM.      Brief Attending Admission Attestation     I have seen and examined the patient, performing an independent face-to-face diagnostic evaluation with plan of care reviewed and developed with the advanced practice clinician JONATHAN Pickard.      Brief Summary Statement/HPI:   Tavo Murrieta is a 38 y.o. male with pMH significant for alcohol abuse, GI bleeding and pancreatitis.  He presented to the [facility] ER today with increasing weakness, lightheadedness, palpitations, and dyspnea on exertion.  I note that he was hospitalized at [facility] in October 2018 and egd report at that time showed evidence of recent bleeding but no obvious source.  He was transfused 4 units of PRBC's and discharged to [facility] for alcohol rehab.  He missed his follow up appointment due to inpatient rehab and after experiencing melena and increasing weakness was admitted to [facility].  He was transfused there and egd report per family showed 2 small gastric ulcers.  He states his Hgb was around 8 at discharge and he has been increasingly weak since that time.    The patient used to drink a pint of vodka per day and now states that he consumes 3-4 beers per day.  Parents are in the room.    He does have a history of alcohol withdrawal seizure.    He denies melena, hematochezia or hematemesis.  His FOBT was negative here in the ER.  He complains of intermittent and severe upper abdominal and back pain.  No N/V/D.  No syncope.  No chest pain.  No LE edema.  He reports his kids have been sick with a virus and the patient has also had ear pressure, sore throat, non productive cough  and occasional fever (up to 100.8).      Here in the ER, the patient is afebrile, -116, normotensive.  H/H 4/16.7, ethanol 122, INR 1.32, LFT's wnl, lipase 54, sodium 130.  CXR negative.  UA negative.  UDS negative.      Attending Physical Exam:  Constitutional: No acute distress, awake, alert  Eyes: PERRLA, sclerae anicteric, no conjunctival injection  HENT: NCAT, mucous membranes moist  Neck: Supple, no thyromegaly, no lymphadenopathy, trachea midline  Respiratory: Clear to auscultation bilaterally, nonlabored respirations   Cardiovascular: Regular, mildly tachy, +  gallop, palpable pedal pulses bilaterally  Gastrointestinal: Positive bowel sounds, soft, nontender, nondistended  Musculoskeletal: No bilateral ankle edema, no clubbing or cyanosis to extremities  Psychiatric: Appropriate affect, cooperative  Neurologic: Oriented x 3, strength symmetric in all extremities, Cranial Nerves grossly intact to confrontation, speech clear  Skin: Very pale        Brief Assessment/Plan :  See above for further detailed assessment and plan developed with APC which I have reviewed and/or edited.      Electronically signed by Sandra Mckeon MD, 01/14/19, 4:58 PM.

## 2019-01-14 NOTE — PLAN OF CARE
Problem: Fall Risk (Adult)  Goal: Identify Related Risk Factors and Signs and Symptoms  Outcome: Outcome(s) achieved Date Met: 01/14/19 01/14/19 1830   Fall Risk (Adult)   Related Risk Factors (Fall Risk) sensory deficits;environment unfamiliar   Signs and Symptoms (Fall Risk) presence of risk factors     Goal: Absence of Fall  Outcome: Ongoing (interventions implemented as appropriate)   01/14/19 1830   Fall Risk (Adult)   Absence of Fall making progress toward outcome       Problem: Alcohol Withdrawal Acute, Risk/Actual (Adult)  Goal: Signs and Symptoms of Listed Potential Problems Will be Absent, Minimized or Managed (Alcohol Withdrawal Acute, Risk/Actual)  Outcome: Ongoing (interventions implemented as appropriate)   01/14/19 1830   Goal/Outcome Evaluation   Problems Assessed (Alcohol Withdrawal Syndrome) all   Problems Present (Alcohol W/D Syndrome) situational response       Problem: Anemia (Adult)  Goal: Identify Related Risk Factors and Signs and Symptoms  Outcome: Outcome(s) achieved Date Met: 01/14/19 01/14/19 1830   Anemia (Adult)   Related Risk Factors (Anemia) bleeding   Signs and Symptoms (Anemia) bruising;dizziness;dyspnea;fatigue     Goal: Symptom Improvement  Outcome: Ongoing (interventions implemented as appropriate)   01/14/19 1830   Anemia (Adult)   Symptom Improvement making progress toward outcome

## 2019-01-14 NOTE — ED PROVIDER NOTES
Subjective   Tavo Murrieta is a 38 y.o.male who presents to the ED with his parents with c/o fatigue with onset one month ago that is waxing and waning. He reports that he developed fatigue, generalized weakness and light-headedness one month ago. He states that his fatigue is worse than others some days and some days he feels well. Per his mother he went to doctor one week ago to get naltrexone to stop drinking alcohol and was told he was anemic at 6.6 then was told to go to ED but he was feeling better at that time so he did not go. He looks pale and his parents have been trying to get him to go to the ED for several weeks but he has refused. He went to rehab in November 2018 then noticed black stools and hematochezia. He told his nurse there but he was told he was fine. He noticed that his blood pressure was low and he felt poorly. He took a stool sample and gave it to the staff then was sent to [place] two months ago for an EGD and colonoscopy where he was found to have 2 ulcers in his stomach. He was admitted at that time and was told he was anemic in his . He was seen at [facility] ED for alcohol use before his admission to [place]. He drinks 3-4 beers a day now and last drank last night at 2300. He previously drank vodka but states his last drink was before his admission to rehab and has not drank hard liquor since. He denies any NSAID use or acid blocking medication. He denies any abdominal pain. He denies any melena or hematochezia currently and states his stools are greenish-brown. He has a cough and subjective fevers and reports that he has sick contacts at home. He has a h/o anemia and alcohol abuse.         History provided by:  Patient and parent  Fatigue   Location:  Fatigue  Quality:  Generalized  Severity:  Moderate  Onset quality:  Gradual  Duration:  2 months  Timing:  Constant  Progression:  Waxing and waning  Chronicity:  Recurrent  Context:  H/o alcohol abuse, told he was anemic with  hemaglobin of 6 but didnt go to ED, worsening pallor parents told him to go to ED  Relieved by:  None  Worsened by:  None  Ineffective treatments:  Fluids  Associated symptoms: cough, fatigue and fever    Associated symptoms: no abdominal pain, no chest pain, no diarrhea, no loss of consciousness, no nausea, no shortness of breath and no vomiting    Risk factors:  Anemia, alcohol abuse      Review of Systems   Constitutional: Positive for chills, fatigue and fever.   Respiratory: Positive for cough. Negative for shortness of breath.    Cardiovascular: Negative for chest pain.   Gastrointestinal: Positive for blood in stool. Negative for abdominal pain, constipation, diarrhea, nausea and vomiting.   Skin: Positive for color change and pallor.   Neurological: Negative for loss of consciousness.   All other systems reviewed and are negative.      Past Medical History:   Diagnosis Date   • Alcohol abuse    • Seizure due to alcohol withdrawal (CMS/HCC)    • Seizures (CMS/HCC)        No Known Allergies    Past Surgical History:   Procedure Laterality Date   • COLONOSCOPY N/A 10/8/2018    Procedure: COLONOSCOPY;  Surgeon: Parish Owen MD;  Location: [facility];  Service: Gastroenterology   • ENDOSCOPY N/A 10/8/2018    Procedure: ESOPHAGOGASTRODUODENOSCOPY;  Surgeon: Parish Owen MD;  Location: [facility];  Service: Gastroenterology       No family history on file.    Social History     Socioeconomic History   • Marital status: Single     Spouse name: Not on file   • Number of children: Not on file   • Years of education: Not on file   • Highest education level: Not on file   Tobacco Use   • Smoking status: Never Smoker   • Smokeless tobacco: Current User     Types: Snuff   Substance and Sexual Activity   • Alcohol use: Yes     Comment: 1 pint liquor daily   • Drug use: No   • Sexual activity: Defer         Objective   Physical Exam   Constitutional: He is oriented to person, place, and time. He appears well-developed  and well-nourished. No distress.   extremely pale and weak.   HENT:   Head: Normocephalic and atraumatic.   Right Ear: External ear normal.   Left Ear: External ear normal.   Nose: Nose normal.   Pale mucosa.    Eyes: Conjunctivae are normal. No scleral icterus.   Pale conjunctivae.    Neck: Normal range of motion. Neck supple.   Cardiovascular: Normal rate, regular rhythm and normal heart sounds. Exam reveals no friction rub.   No murmur heard.  Pulmonary/Chest: Effort normal and breath sounds normal. No respiratory distress. He has no wheezes. He has no rales.   Abdominal: Soft. Bowel sounds are normal. He exhibits no distension. There is no tenderness.   Abdomen is non tender.    Genitourinary:   Genitourinary Comments: Stool light brown guaiac negative.   Musculoskeletal: Normal range of motion. He exhibits no edema or tenderness.   Neurological: He is alert and oriented to person, place, and time.   Skin: Skin is warm and dry. He is not diaphoretic.   Psychiatric: He has a normal mood and affect. His behavior is normal.   Nursing note and vitals reviewed.      Critical Care  Performed by: Fam Ríos MD  Authorized by: Fam Ríos MD     Critical care provider statement:     Critical care time (minutes):  30    Critical care time was exclusive of:  Separately billable procedures and treating other patients    Critical care was necessary to treat or prevent imminent or life-threatening deterioration of the following conditions:  Circulatory failure    Critical care was time spent personally by me on the following activities:  Evaluation of patient's response to treatment, examination of patient, obtaining history from patient or surrogate, ordering and performing treatments and interventions, ordering and review of laboratory studies, ordering and review of radiographic studies, re-evaluation of patient's condition, development of treatment plan with patient or surrogate, pulse oximetry, discussions  with consultants and review of old charts               ED Course  Recent Results (from the past 24 hour(s))   Light Blue Top    Collection Time: 01/14/19  1:14 PM   Result Value Ref Range    Extra Tube hold for add-on    Green Top (Gel)    Collection Time: 01/14/19  1:14 PM   Result Value Ref Range    Extra Tube Hold for add-ons.    Lavender Top    Collection Time: 01/14/19  1:14 PM   Result Value Ref Range    Extra Tube hold for add-on    Gold Top - SST    Collection Time: 01/14/19  1:14 PM   Result Value Ref Range    Extra Tube Hold for add-ons.    Comprehensive Metabolic Panel    Collection Time: 01/14/19  1:14 PM   Result Value Ref Range    Glucose 124 (H) 70 - 100 mg/dL    BUN 6 (L) 9 - 23 mg/dL    Creatinine 0.65 0.60 - 1.30 mg/dL    Sodium 130 (L) 132 - 146 mmol/L    Potassium 3.5 3.5 - 5.5 mmol/L    Chloride 100 99 - 109 mmol/L    CO2 23.0 20.0 - 31.0 mmol/L    Calcium 8.6 (L) 8.7 - 10.4 mg/dL    Total Protein 7.2 5.7 - 8.2 g/dL    Albumin 4.04 3.20 - 4.80 g/dL    ALT (SGPT) 19 7 - 40 U/L    AST (SGOT) 20 0 - 33 U/L    Alkaline Phosphatase 92 25 - 100 U/L    Total Bilirubin 0.8 0.3 - 1.2 mg/dL    eGFR Non African Amer 137 >60 mL/min/1.73    Globulin 3.2 gm/dL    A/G Ratio 1.3 (L) 1.5 - 2.5 g/dL    BUN/Creatinine Ratio 9.2 7.0 - 25.0    Anion Gap 7.0 3.0 - 11.0 mmol/L   Lipase    Collection Time: 01/14/19  1:14 PM   Result Value Ref Range    Lipase 54 (H) 6 - 51 U/L   Protime-INR    Collection Time: 01/14/19  1:14 PM   Result Value Ref Range    Protime 15.8 (H) 11.2 - 14.3 Seconds    INR 1.32 (H) 0.85 - 1.16   aPTT    Collection Time: 01/14/19  1:14 PM   Result Value Ref Range    PTT 29.6 24.0 - 37.0 seconds   Hepatitis Panel, Acute    Collection Time: 01/14/19  1:14 PM   Result Value Ref Range    Hepatitis B Surface Ag Non-Reactive Non-Reactive    Hep A IgM Non-Reactive Non-Reactive    Hep B C IgM Non-Reactive Non-Reactive    Hepatitis C Ab Non-Reactive Non-Reactive   Ethanol    Collection Time: 01/14/19   1:14 PM   Result Value Ref Range    Ethanol 122 (H) 0 - 10 mg/dL   Acetaminophen Level    Collection Time: 01/14/19  1:14 PM   Result Value Ref Range    Acetaminophen <10.0 0.0 - 30.0 mcg/mL   CBC Auto Differential    Collection Time: 01/14/19  1:14 PM   Result Value Ref Range    WBC 5.88 3.50 - 10.80 10*3/mm3    RBC 2.85 (L) 4.20 - 5.76 10*6/mm3    Hemoglobin 4.0 (C) 13.1 - 17.5 g/dL    Hematocrit 16.7 (C) 38.9 - 50.9 %    MCV 58.6 (L) 80.0 - 99.0 fL    MCH 14.0 (L) 27.0 - 31.0 pg    MCHC 24.0 (L) 32.0 - 36.0 g/dL    RDW 21.5 (H) 11.3 - 14.5 %    RDW-SD 44.5 37.0 - 54.0 fl    MPV 9.1 6.0 - 12.0 fL    Platelets 259 150 - 450 10*3/mm3    Neutrophil % 61.2 41.0 - 71.0 %    Lymphocyte % 31.5 24.0 - 44.0 %    Monocyte % 6.6 0.0 - 12.0 %    Eosinophil % 0.5 0.0 - 3.0 %    Basophil % 0.2 0.0 - 1.0 %    Immature Grans % 0.2 0.0 - 0.6 %    Neutrophils, Absolute 3.60 1.50 - 8.30 10*3/mm3    Lymphocytes, Absolute 1.85 0.60 - 4.80 10*3/mm3    Monocytes, Absolute 0.39 0.00 - 1.00 10*3/mm3    Eosinophils, Absolute 0.03 0.00 - 0.30 10*3/mm3    Basophils, Absolute 0.01 0.00 - 0.20 10*3/mm3    Immature Grans, Absolute 0.01 0.00 - 0.03 10*3/mm3   Scan Slide    Collection Time: 01/14/19  1:14 PM   Result Value Ref Range    Hypochromia Large/3+ None Seen    Microcytes Large/3+ None Seen    Schistocytes Slight/1+ None Seen    Stomatocytes Slight/1+ None Seen    WBC Morphology Normal Normal    Platelet Morphology Normal Normal   Type & Screen    Collection Time: 01/14/19  1:18 PM   Result Value Ref Range    ABO Type A     RH type Positive     Antibody Screen Negative     T&S Expiration Date 1/17/2019 11:59:59 PM      Note: In addition to lab results from this visit, the labs listed above may include labs taken at another facility or during a different encounter within the last 24 hours. Please correlate lab times with ED admission and discharge times for further clarification of the services performed during this visit.    XR Chest 2  "View   Preliminary Result   No active disease.       D:  01/14/2019   E:  01/14/2019                    Vitals:    01/14/19 1256 01/14/19 1459   BP: 132/73    BP Location: Left arm    Patient Position: Sitting    Pulse: 116 119   Resp: 14    Temp: 98.1 °F (36.7 °C)    TempSrc: Oral    SpO2: 100% 100%   Weight: 76.7 kg (169 lb)    Height: 185.4 cm (73\")      Medications   sodium chloride 0.9 % flush 10 mL (not administered)   pantoprazole (PROTONIX) injection 80 mg (not administered)   multiple vitamin (M.V.I. Adult) 10 mL, thiamine (B-1) 100 mg, folic acid 1 mg, magnesium sulfate 2 g in Sodium chloride 0.9 % 1,000 mL infusion (not administered)     ECG/EMG Results (last 24 hours)     ** No results found for the last 24 hours. **        No orders to display         ED Course as of Jan 14 1547   Mon Jan 14, 2019   1547 Case discussed in detail with Dr. Mckeon.   [ML]      ED Course User Index  [ML] Trey Seth                     [facility]    Final diagnoses:   Symptomatic anemia       Documentation assistance provided by enoch Seth.  Information recorded by the enoch was done at my direction and has been verified and validated by me.     Trey Seth  01/14/19 0074       Fam Ríos MD  01/17/19 0726    "

## 2019-01-15 ENCOUNTER — ANESTHESIA (OUTPATIENT)
Dept: GASTROENTEROLOGY | Facility: HOSPITAL | Age: 39
End: 2019-01-15

## 2019-01-15 ENCOUNTER — APPOINTMENT (OUTPATIENT)
Dept: CT IMAGING | Facility: HOSPITAL | Age: 39
End: 2019-01-15

## 2019-01-15 ENCOUNTER — ANESTHESIA EVENT (OUTPATIENT)
Dept: GASTROENTEROLOGY | Facility: HOSPITAL | Age: 39
End: 2019-01-15

## 2019-01-15 LAB
ABO + RH BLD: NORMAL
ABO + RH BLD: NORMAL
ALBUMIN SERPL-MCNC: 3.44 G/DL (ref 3.2–4.8)
ALBUMIN/GLOB SERPL: 1.3 G/DL (ref 1.5–2.5)
ALP SERPL-CCNC: 81 U/L (ref 25–100)
ALT SERPL W P-5'-P-CCNC: 15 U/L (ref 7–40)
ANION GAP SERPL CALCULATED.3IONS-SCNC: 5 MMOL/L (ref 3–11)
AST SERPL-CCNC: 16 U/L (ref 0–33)
BH BB BLOOD EXPIRATION DATE: NORMAL
BH BB BLOOD EXPIRATION DATE: NORMAL
BH BB BLOOD TYPE BARCODE: 6200
BH BB BLOOD TYPE BARCODE: 6200
BH BB DISPENSE STATUS: NORMAL
BH BB DISPENSE STATUS: NORMAL
BH BB PRODUCT CODE: NORMAL
BH BB PRODUCT CODE: NORMAL
BH BB UNIT NUMBER: NORMAL
BH BB UNIT NUMBER: NORMAL
BILIRUB SERPL-MCNC: 1.3 MG/DL (ref 0.3–1.2)
BUN BLD-MCNC: 6 MG/DL (ref 9–23)
BUN/CREAT SERPL: 11.1 (ref 7–25)
CALCIUM SPEC-SCNC: 8 MG/DL (ref 8.7–10.4)
CHLORIDE SERPL-SCNC: 107 MMOL/L (ref 99–109)
CO2 SERPL-SCNC: 23 MMOL/L (ref 20–31)
CREAT BLD-MCNC: 0.54 MG/DL (ref 0.6–1.3)
CROSSMATCH INTERPRETATION: NORMAL
CROSSMATCH INTERPRETATION: NORMAL
DEPRECATED RDW RBC AUTO: 68.7 FL (ref 37–54)
ERYTHROCYTE [DISTWIDTH] IN BLOOD BY AUTOMATED COUNT: 27.8 % (ref 11.3–14.5)
GFR SERPL CREATININE-BSD FRML MDRD: >150 ML/MIN/1.73
GLOBULIN UR ELPH-MCNC: 2.6 GM/DL
GLUCOSE BLD-MCNC: 101 MG/DL (ref 70–100)
HCT VFR BLD AUTO: 23.9 % (ref 38.9–50.9)
HCT VFR BLD AUTO: 27.5 % (ref 38.9–50.9)
HCT VFR BLD AUTO: 28.6 % (ref 38.9–50.9)
HCT VFR BLD AUTO: 30.2 % (ref 38.9–50.9)
HGB BLD-MCNC: 6.7 G/DL (ref 13.1–17.5)
HGB BLD-MCNC: 8.1 G/DL (ref 13.1–17.5)
HGB BLD-MCNC: 8.3 G/DL (ref 13.1–17.5)
HGB BLD-MCNC: 8.9 G/DL (ref 13.1–17.5)
LIPASE SERPL-CCNC: 57 U/L (ref 6–51)
MAGNESIUM SERPL-MCNC: 1.7 MG/DL (ref 1.3–2.7)
MCH RBC QN AUTO: 20.6 PG (ref 27–31)
MCHC RBC AUTO-ENTMCNC: 29.5 G/DL (ref 32–36)
MCV RBC AUTO: 69.8 FL (ref 80–99)
PLATELET # BLD AUTO: 209 10*3/MM3 (ref 150–450)
POTASSIUM BLD-SCNC: 4.1 MMOL/L (ref 3.5–5.5)
PROT SERPL-MCNC: 6 G/DL (ref 5.7–8.2)
RBC # BLD AUTO: 3.94 10*6/MM3 (ref 4.2–5.76)
RETICS/RBC NFR AUTO: 1.29 % (ref 0.5–1.5)
SODIUM BLD-SCNC: 135 MMOL/L (ref 132–146)
UNIT  ABO: NORMAL
UNIT  ABO: NORMAL
UNIT  RH: NORMAL
UNIT  RH: NORMAL
WBC NRBC COR # BLD: 6.24 10*3/MM3 (ref 3.5–10.8)

## 2019-01-15 PROCEDURE — P9016 RBC LEUKOCYTES REDUCED: HCPCS

## 2019-01-15 PROCEDURE — 83690 ASSAY OF LIPASE: CPT | Performed by: FAMILY MEDICINE

## 2019-01-15 PROCEDURE — 85014 HEMATOCRIT: CPT | Performed by: INTERNAL MEDICINE

## 2019-01-15 PROCEDURE — 0DB98ZX EXCISION OF DUODENUM, VIA NATURAL OR ARTIFICIAL OPENING ENDOSCOPIC, DIAGNOSTIC: ICD-10-PCS | Performed by: INTERNAL MEDICINE

## 2019-01-15 PROCEDURE — 99254 IP/OBS CNSLTJ NEW/EST MOD 60: CPT | Performed by: PHYSICIAN ASSISTANT

## 2019-01-15 PROCEDURE — 88342 IMHCHEM/IMCYTCHM 1ST ANTB: CPT | Performed by: INTERNAL MEDICINE

## 2019-01-15 PROCEDURE — 80053 COMPREHEN METABOLIC PANEL: CPT | Performed by: FAMILY MEDICINE

## 2019-01-15 PROCEDURE — 85018 HEMOGLOBIN: CPT | Performed by: INTERNAL MEDICINE

## 2019-01-15 PROCEDURE — 99233 SBSQ HOSP IP/OBS HIGH 50: CPT | Performed by: INTERNAL MEDICINE

## 2019-01-15 PROCEDURE — 0 IOPAMIDOL PER 1 ML: Performed by: INTERNAL MEDICINE

## 2019-01-15 PROCEDURE — 86900 BLOOD TYPING SEROLOGIC ABO: CPT

## 2019-01-15 PROCEDURE — 25010000002 PROPOFOL 10 MG/ML EMULSION: Performed by: NURSE ANESTHETIST, CERTIFIED REGISTERED

## 2019-01-15 PROCEDURE — 83735 ASSAY OF MAGNESIUM: CPT | Performed by: FAMILY MEDICINE

## 2019-01-15 PROCEDURE — 0DB68ZX EXCISION OF STOMACH, VIA NATURAL OR ARTIFICIAL OPENING ENDOSCOPIC, DIAGNOSTIC: ICD-10-PCS | Performed by: INTERNAL MEDICINE

## 2019-01-15 PROCEDURE — 25010000002 LORAZEPAM PER 2 MG: Performed by: FAMILY MEDICINE

## 2019-01-15 PROCEDURE — 25010000002 VITAMIN K1 PER 1 MG: Performed by: PHYSICIAN ASSISTANT

## 2019-01-15 PROCEDURE — 36415 COLL VENOUS BLD VENIPUNCTURE: CPT | Performed by: INTERNAL MEDICINE

## 2019-01-15 PROCEDURE — 88305 TISSUE EXAM BY PATHOLOGIST: CPT | Performed by: INTERNAL MEDICINE

## 2019-01-15 PROCEDURE — 36430 TRANSFUSION BLD/BLD COMPNT: CPT

## 2019-01-15 PROCEDURE — 85027 COMPLETE CBC AUTOMATED: CPT | Performed by: FAMILY MEDICINE

## 2019-01-15 PROCEDURE — 74177 CT ABD & PELVIS W/CONTRAST: CPT

## 2019-01-15 RX ORDER — SODIUM CHLORIDE 0.9 % (FLUSH) 0.9 %
3 SYRINGE (ML) INJECTION EVERY 12 HOURS SCHEDULED
Status: DISCONTINUED | OUTPATIENT
Start: 2019-01-15 | End: 2019-01-15 | Stop reason: HOSPADM

## 2019-01-15 RX ORDER — FAMOTIDINE 10 MG/ML
20 INJECTION, SOLUTION INTRAVENOUS ONCE
Status: DISCONTINUED | OUTPATIENT
Start: 2019-01-15 | End: 2019-01-15 | Stop reason: HOSPADM

## 2019-01-15 RX ORDER — PROMETHAZINE HYDROCHLORIDE 25 MG/ML
6.25 INJECTION, SOLUTION INTRAMUSCULAR; INTRAVENOUS ONCE AS NEEDED
Status: DISCONTINUED | OUTPATIENT
Start: 2019-01-15 | End: 2019-01-15

## 2019-01-15 RX ORDER — LIDOCAINE HYDROCHLORIDE 10 MG/ML
0.5 INJECTION, SOLUTION EPIDURAL; INFILTRATION; INTRACAUDAL; PERINEURAL ONCE AS NEEDED
Status: DISCONTINUED | OUTPATIENT
Start: 2019-01-15 | End: 2019-01-15 | Stop reason: HOSPADM

## 2019-01-15 RX ORDER — LIDOCAINE HYDROCHLORIDE 10 MG/ML
INJECTION, SOLUTION INFILTRATION; PERINEURAL AS NEEDED
Status: DISCONTINUED | OUTPATIENT
Start: 2019-01-15 | End: 2019-01-15 | Stop reason: SURG

## 2019-01-15 RX ORDER — SODIUM CHLORIDE 0.9 % (FLUSH) 0.9 %
3-10 SYRINGE (ML) INJECTION AS NEEDED
Status: DISCONTINUED | OUTPATIENT
Start: 2019-01-15 | End: 2019-01-15 | Stop reason: HOSPADM

## 2019-01-15 RX ORDER — PROPOFOL 10 MG/ML
VIAL (ML) INTRAVENOUS AS NEEDED
Status: DISCONTINUED | OUTPATIENT
Start: 2019-01-15 | End: 2019-01-15 | Stop reason: SURG

## 2019-01-15 RX ORDER — PROMETHAZINE HYDROCHLORIDE 25 MG/1
25 TABLET ORAL ONCE AS NEEDED
Status: DISCONTINUED | OUTPATIENT
Start: 2019-01-15 | End: 2019-01-15

## 2019-01-15 RX ORDER — SODIUM CHLORIDE, SODIUM LACTATE, POTASSIUM CHLORIDE, CALCIUM CHLORIDE 600; 310; 30; 20 MG/100ML; MG/100ML; MG/100ML; MG/100ML
9 INJECTION, SOLUTION INTRAVENOUS CONTINUOUS
Status: DISCONTINUED | OUTPATIENT
Start: 2019-01-15 | End: 2019-01-16

## 2019-01-15 RX ORDER — PROMETHAZINE HYDROCHLORIDE 25 MG/1
25 SUPPOSITORY RECTAL ONCE AS NEEDED
Status: DISCONTINUED | OUTPATIENT
Start: 2019-01-15 | End: 2019-01-15

## 2019-01-15 RX ADMIN — PROPOFOL 100 MG: 10 INJECTION, EMULSION INTRAVENOUS at 11:49

## 2019-01-15 RX ADMIN — LORAZEPAM 2 MG: 2 INJECTION INTRAMUSCULAR; INTRAVENOUS at 03:28

## 2019-01-15 RX ADMIN — PANTOPRAZOLE SODIUM 40 MG: 40 INJECTION, POWDER, FOR SOLUTION INTRAVENOUS at 21:35

## 2019-01-15 RX ADMIN — LIDOCAINE HYDROCHLORIDE 100 MG: 10 INJECTION, SOLUTION INFILTRATION; PERINEURAL at 11:49

## 2019-01-15 RX ADMIN — PROPOFOL 50 MG: 10 INJECTION, EMULSION INTRAVENOUS at 11:52

## 2019-01-15 RX ADMIN — SODIUM CHLORIDE, POTASSIUM CHLORIDE, SODIUM LACTATE AND CALCIUM CHLORIDE: 600; 310; 30; 20 INJECTION, SOLUTION INTRAVENOUS at 11:44

## 2019-01-15 RX ADMIN — SODIUM CHLORIDE, PRESERVATIVE FREE 3 ML: 5 INJECTION INTRAVENOUS at 08:11

## 2019-01-15 RX ADMIN — LORAZEPAM 1 MG: 2 INJECTION INTRAMUSCULAR; INTRAVENOUS at 21:34

## 2019-01-15 RX ADMIN — MUPIROCIN: 20 OINTMENT TOPICAL at 21:34

## 2019-01-15 RX ADMIN — PHYTONADIONE 10 MG: 10 INJECTION, EMULSION INTRAMUSCULAR; INTRAVENOUS; SUBCUTANEOUS at 10:44

## 2019-01-15 RX ADMIN — FOLIC ACID 1 MG: 1 TABLET ORAL at 08:04

## 2019-01-15 RX ADMIN — PANTOPRAZOLE SODIUM 40 MG: 40 INJECTION, POWDER, FOR SOLUTION INTRAVENOUS at 08:04

## 2019-01-15 RX ADMIN — SODIUM CHLORIDE, PRESERVATIVE FREE 3 ML: 5 INJECTION INTRAVENOUS at 21:35

## 2019-01-15 RX ADMIN — IOPAMIDOL 150 ML: 755 INJECTION, SOLUTION INTRAVENOUS at 20:47

## 2019-01-15 RX ADMIN — MUPIROCIN: 20 OINTMENT TOPICAL at 08:04

## 2019-01-15 RX ADMIN — LORAZEPAM 1 MG: 2 INJECTION INTRAMUSCULAR; INTRAVENOUS at 08:09

## 2019-01-15 RX ADMIN — Medication 1 TABLET: at 08:04

## 2019-01-15 RX ADMIN — Medication 100 MG: at 08:04

## 2019-01-15 RX ADMIN — LORAZEPAM 1 MG: 2 INJECTION INTRAMUSCULAR; INTRAVENOUS at 15:19

## 2019-01-15 NOTE — PROGRESS NOTES
Discharge Planning Assessment  [facility]     Patient Name: Tavo Murrieta  MRN: [id number]  Today's Date: 1/15/2019    Admit Date: 1/14/2019    Discharge Needs Assessment     Row Name 01/15/19 1205       Living Environment    Lives With  significant other    Name(s) of Who Lives With Patient  latrice Hartley    Current Living Arrangements  home/apartment/condo    Primary Care Provided by  self    Provides Primary Care For  no one    Family Caregiver if Needed  significant other    Quality of Family Relationships  unable to assess    Able to Return to Prior Arrangements  yes       Transition Planning    Patient/Family Anticipates Transition to  home with family    Patient/Family Anticipated Services at Transition  rehabilitation services    Transportation Anticipated  family or friend will provide       Discharge Needs Assessment    Readmission Within the Last 30 Days  no previous admission in last 30 days    Concerns to be Addressed  substance/tobacco abuse/use;discharge planning    Equipment Currently Used at Home  none    Equipment Needed After Discharge  none    Outpatient/Agency/Support Group Needs  outpatient substance abuse treatment    Discharge Facility/Level of Care Needs  substance abuse facility    Offered/Gave Vendor List  yes    Current Discharge Risk  substance use/abuse    Discharge Coordination/Progress  Pt. lives in [place] [place] with his liz.  Pt. did not have home health services and does not use DME.  He will not need either at discharge.  Pt's family will assist with transportation at discharge.        Discharge Plan     Row Name 01/15/19 1215       Plan    Plan  home with family    Plan Comments  SW met with pt at bedside.  No family was present.  Pt's plan is to return home at discharge.  Pt. is agreeable to accepting outpatient substance abuse resources for alcohol cessation from .  Pt. stated after he was discharged from [facility] in mid October 2018, he went to the [facility]House for treatment.  He stated he was there for 2 weeks before having to return to the hospital and was \"medically discharged\" from the [facility].  SW gave update in multidisciplinary rounds.          Destination      No service coordination in this encounter.      Durable Medical Equipment      No service coordination in this encounter.      Dialysis/Infusion      No service coordination in this encounter.      Home Medical Care      No service coordination in this encounter.      Community Resources      No service coordination in this encounter.        Expected Discharge Date and Time     Expected Discharge Date Expected Discharge Time    Jan 17, 2019         Demographic Summary     Row Name 01/15/19 1203       General Information    Admission Type  inpatient    Arrived From  home    Referral Source  nursing    Reason for Consult  substance use concerns    Preferred Language  English     Used During This Interaction  no    General Information Comments  PCP is maddie Pena MD; however pt stated he has never seen this provider.        Functional Status     Row Name 01/15/19 1204       Functional Status, IADL    IADL Comments  Pt. stated he is independent with ADLs, but noted that he doesn't do much activity when home.         Employment/    Employment Status  unemployed    Employment/ Comments  Pt. has insurance and prescription coverage with [employer].  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       Abuse/Neglect    No documentation.       Legal    No documentation.       Substance Abuse    No documentation.       Patient Forms    No documentation.           MADDIE Chaudhari    "

## 2019-01-15 NOTE — PROGRESS NOTES
[facility] Medicine Services  PROGRESS NOTE    Patient Name: Tavo Murrieta  : 1980  MRN: [id number]    Date of Admission: 2019  Length of Stay: 1  Primary Care Physician: Duc Pena MD    Subjective   Subjective     CC: f/u dizziness    HPI: Resting comfortably in bed but upon waking him he says he feels jittery. Denies hallucinations. No signs of bleeding.     Review of Systems  Gen- No fevers, chills  CV- No chest pain, palpitations  Resp- No cough, dyspnea  GI- No N/V/D, abd pain    Otherwise ROS is negative except as mentioned in the HPI.    Objective   Objective     Vital Signs:   Temp:  [97.6 °F (36.4 °C)-98.6 °F (37 °C)] 98.1 °F (36.7 °C)  Heart Rate:  [] 95  Resp:  [14-20] 15  BP: (105-132)/(61-86) 114/79        Physical Exam:  Constitutional: No acute distress, awake, alert, chronically ill appearing, appears older than stated age  HENT: NCAT, mucous membranes moist  Respiratory: Clear to auscultation bilaterally, respiratory effort normal   Cardiovascular: RRR, no murmurs, rubs, or gallops, palpable pedal pulses bilaterally  Gastrointestinal: Positive bowel sounds, soft, nontender, nondistended  Musculoskeletal: No bilateral ankle edema  Psychiatric: Appropriate affect, cooperative  Neurologic: Oriented x 3, strength symmetric in all extremities, Cranial Nerves grossly intact to confrontation, speech clear  Skin: No rashes    Results Reviewed:  I have personally reviewed current lab, radiology, and data and agree.    Results from last 7 days   Lab Units  19   2318  19   1314   WBC 10*3/mm3   --   5.88   HEMOGLOBIN g/dL  6.7*  4.0*   HEMATOCRIT %  23.9*  16.7*   PLATELETS 10*3/mm3   --   259   INR    --   1.32*     Results from last 7 days   Lab Units  01/15/19   1014  19   1314   SODIUM mmol/L  135  130*   POTASSIUM mmol/L  4.1  3.5   CHLORIDE mmol/L  107  100   CO2 mmol/L  23.0  23.0   BUN mg/dL  6*  6*   CREATININE mg/dL  0.54*  0.65    GLUCOSE mg/dL  101*  124*   CALCIUM mg/dL  8.0*  8.6*   ALT (SGPT) U/L  15  19   AST (SGOT) U/L  16  20     Estimated Creatinine Clearance: 196.9 mL/min (A) (by C-G formula based on SCr of 0.54 mg/dL (L)).    No results found for: BNP    Microbiology Results Abnormal     None        CXR personally reviewed without acute disease. Agree with interpretation.   Imaging Results (last 24 hours)     Procedure Component Value Units Date/Time    XR Chest 2 View [[id number]] Collected:  01/14/19 1450     Updated:  01/14/19 1618    Narrative:       EXAMINATION: XR CHEST 2 VW-01/14/2019:      INDICATION: Weak.      COMPARISON: NONE.     FINDINGS: The heart size is normal. There is no pulmonary inflammatory  process. There is no mass or effusion.           Impression:       No active disease.     D:  01/14/2019  E:  01/14/2019     This report was finalized on 1/14/2019 4:16 PM by Dr. Roc Ruiz MD.             Results for orders placed during the hospital encounter of 10/07/18   Adult Transthoracic Echo Complete W/ Cont if Necessary Per Protocol    Narrative · Left ventricular systolic function is normal. Estimated EF appears to be   in the range of 61 - 65%. Normal left ventricular cavity size and wall   thickness noted. Left ventricular diastolic function is normal.     No significant valvular heart disease noted.  Essentially normal cardiac structure and function.         I have reviewed the medications:    Current Facility-Administered Medications:   •  acetaminophen (TYLENOL) tablet 650 mg, 650 mg, Oral, Q6H PRN, Sandra Mckeon MD  •  bisacodyl (DULCOLAX) EC tablet 5 mg, 5 mg, Oral, Daily PRN, Sandra Mckeon MD  •  bisacodyl (DULCOLAX) suppository 10 mg, 10 mg, Rectal, Daily PRN, Sandra Mckeon MD  •  thiamine (VITAMIN B-1) tablet 100 mg, 100 mg, Oral, Daily, 100 mg at 01/15/19 0804 **AND** multivitamin (THERAGRAN) tablet 1 tablet, 1 tablet, Oral, Daily, 1 tablet at 01/15/19 0804 **AND** folic acid (FOLVITE)  tablet 1 mg, 1 mg, Oral, Daily, Sandra Mckeon MD, 1 mg at 01/15/19 0804  •  lactated ringers infusion, 9 mL/hr, Intravenous, Continuous, Steven Chatman MD  •  lidocaine PF 1% (XYLOCAINE) injection 0.5 mL, 0.5 mL, Injection, Once PRN, Steven Chatman MD  •  LORazepam (ATIVAN) tablet 1 mg, 1 mg, Oral, Q2H PRN, 1 mg at 01/14/19 2001 **OR** LORazepam (ATIVAN) injection 1 mg, 1 mg, Intravenous, Q2H PRN, 1 mg at 01/15/19 0809 **OR** LORazepam (ATIVAN) tablet 2 mg, 2 mg, Oral, Q1H PRN, 2 mg at 01/14/19 2222 **OR** LORazepam (ATIVAN) injection 2 mg, 2 mg, Intravenous, Q1H PRN, 2 mg at 01/15/19 0328 **OR** LORazepam (ATIVAN) injection 2 mg, 2 mg, Intravenous, Q15 Min PRN **OR** LORazepam (ATIVAN) injection 2 mg, 2 mg, Intramuscular, Q15 Min PRN, Sandra Mckeon MD  •  Magnesium Sulfate 2 gram Bolus, followed by 8 gram infusion (total Mg dose 10 grams)- Mg less than or equal to 1mg/dL, 2 g, Intravenous, PRN **OR** Magnesium Sulfate 2 gram / 50mL Infusion (GIVE X 3 BAGS TO EQUAL 6GM TOTAL DOSE) - Mg 1.1 - 1.5 mg/dl, 2 g, Intravenous, PRN **OR** Magnesium Sulfate 4 gram infusion- Mg 1.6-1.9 mg/dL, 4 g, Intravenous, PRN, Sandra Mckeon MD  •  mupirocin (BACTROBAN) 2 % ointment, , Topical, Q12H, Sandra Mckeon MD  •  ondansetron (ZOFRAN) tablet 4 mg, 4 mg, Oral, Q6H PRN **OR** ondansetron (ZOFRAN) injection 4 mg, 4 mg, Intravenous, Q6H PRN, Sandra Mckeon MD  •  pantoprazole (PROTONIX) injection 40 mg, 40 mg, Intravenous, Q12H, Sandra Mckeon MD, 40 mg at 01/15/19 0804  •  phytonadione (AQUA-MEPHYTON, VITAMIN K) 10 mg in Sodium chloride 0.9 % 50 mL IVPB, 10 mg, Intravenous, Once, Cristel Su PA, Last Rate: 50 mL/hr at 01/15/19 1044, 10 mg at 01/15/19 1044  •  potassium chloride (MICRO-K) CR capsule 40 mEq, 40 mEq, Oral, PRN, 40 mEq at 01/14/19 5334 **OR** potassium chloride (KLOR-CON) packet 40 mEq, 40 mEq, Oral, PRN **OR** potassium chloride 10 mEq in 100 mL IVPB, 10 mEq, Intravenous, Q1H PRN, Mike  Sandra LAYNE MD  •  sodium chloride 0.9 % flush 10 mL, 10 mL, Intravenous, PRN, Fam Ríos MD  •  sodium chloride 0.9 % flush 3 mL, 3 mL, Intravenous, Q12H, Sandra Mckeon MD, 3 mL at 01/15/19 0811  •  sodium chloride 0.9 % flush 3 mL, 3 mL, Intravenous, Q12H, Steven Chatman MD  •  sodium chloride 0.9 % flush 3-10 mL, 3-10 mL, Intravenous, PRN, Sandra Mckeon MD  •  sodium chloride 0.9 % flush 3-10 mL, 3-10 mL, Intravenous, PRN, Steven Chatman MD      Assessment/Plan   Assessment / Plan     Active Hospital Problems    Diagnosis Date Noted   • **Symptomatic anemia [D64.9] 01/14/2019   • Alcoholism /alcohol abuse (CMS/HCC) [F10.20] 01/14/2019   • Hyponatremia [E87.1] 01/14/2019   • history of Seizure due to alcohol withdrawal (CMS/[facility]) [F10.239, R56.9] 01/14/2019   • Viral upper respiratory tract infection [J06.9] 01/14/2019          Brief Hospital Course to date:  Tavo Murrieta is a 38 y.o. male with ongoing ETOH abuse presenting from home with symptomatic anemia.     A/P:  --Unclear as to cause of his severe TABITHA. Has hx of GIB related to PUD in past. Has received 4 units prbcs. No obvious bleeding at this time. D/W GI, for EGD this am then will need PO iron at d/c.  --Ativan per CIWA protocol for ETOH w/drawal.   --Continue serial h/h with transfusions as needed.    **Needs outpatient ETOH counseling/treatment. D/W SW this am    DVT Prophylaxis: Mechanical    Disposition: I expect the patient to be discharged home in 1-2 days.    CODE STATUS:   Code Status and Medical Interventions:   Ordered at: 01/14/19 1646     Code Status:    CPR     Medical Interventions (Level of Support Prior to Arrest):    Full         Electronically signed by Brooke Stone II, DO, 01/15/19, 11:40 AM.

## 2019-01-15 NOTE — BRIEF OP NOTE
ESOPHAGOGASTRODUODENOSCOPY  Progress Note    Tavo Murrieta  1/14/2019 - 1/15/2019    EGD shows no blood. Mild erosive gastritis seen. Biopsies taken for H pylori and celiac.    >> Obtain CT enterography    Mark I. Brunner, MD     Date: 1/15/2019  Time: 12:39 PM

## 2019-01-15 NOTE — PLAN OF CARE
Problem: Patient Care Overview  Goal: Plan of Care Review  Outcome: Ongoing (interventions implemented as appropriate)   01/15/19 7532   Coping/Psychosocial   Plan of Care Reviewed With patient   Plan of Care Review   Progress no change   OTHER   Outcome Summary VSS, pt has been AOx4 throughout shift with minimal-moderate scoring on CIWA scoring. Ativan given PRN. Pt received 4 units PRBC throughout shift-tolerating well. Pt has been resting intermittently throughout shift. Potassium protocol initiated. GI consult in AM. Pt has been NPO since midnight. Will continue to monitor.

## 2019-01-15 NOTE — ANESTHESIA POSTPROCEDURE EVALUATION
Patient: Tavo Murrieta    Procedure Summary     Date:  01/15/19 Room / Location:   [facility] /  [facility]    Anesthesia Start:  1144 Anesthesia Stop:      Procedure:  ESOPHAGOGASTRODUODENOSCOPY (N/A ) Diagnosis:       Symptomatic anemia      (Symptomatic anemia [D64.9])    Surgeon:  Brunner, Mark I, MD Provider:  Luisito Zheng MD    Anesthesia Type:  Not recorded ASA Status:  4          Anesthesia Type: No value filed.  Last vitals  BP   111/79 (01/15/19 1208)   Temp   97.8 °F (36.6 °C) (01/15/19 1208)   Pulse   86 (01/15/19 1208)   Resp   20 (01/15/19 1208)     SpO2   97 % (01/15/19 1208)     Post Anesthesia Care and Evaluation    Patient location during evaluation: PACU  Patient participation: complete - patient cannot participate  Level of consciousness: responsive to physical stimuli  Pain score: 0  Pain management: adequate  Airway patency: patent  Anesthetic complications: No anesthetic complications    Cardiovascular status: stable  Respiratory status: acceptable, nasal cannula and spontaneous ventilation  Hydration status: stable    Comments: Pt transferred to PACU with O2. Vital signs stable. Report to PACU RN and care accepted.

## 2019-01-15 NOTE — PLAN OF CARE
Problem: Fall Risk (Adult)  Goal: Absence of Fall  Outcome: Ongoing (interventions implemented as appropriate)   01/15/19 0444   Fall Risk (Adult)   Absence of Fall making progress toward outcome       Problem: Alcohol Withdrawal Acute, Risk/Actual (Adult)  Goal: Signs and Symptoms of Listed Potential Problems Will be Absent, Minimized or Managed (Alcohol Withdrawal Acute, Risk/Actual)  Outcome: Ongoing (interventions implemented as appropriate)   01/15/19 0444   Goal/Outcome Evaluation   Problems Assessed (Alcohol Withdrawal Syndrome) all   Problems Present (Alcohol W/D Syndrome) situational response;effects of SABI (alcohol withdrawal syndrome)       Problem: Anemia (Adult)  Goal: Symptom Improvement  Outcome: Ongoing (interventions implemented as appropriate)   01/15/19 0444   Anemia (Adult)   Symptom Improvement making progress toward outcome

## 2019-01-15 NOTE — PAYOR COMM NOTE
"Mare Becerra RN    Phone [phone]  Fax [phone]      Tavo Portillo (38 y.o. Male)     Date of Birth Social Security Number Address Home Phone MRN    [id number]  [id number] [facility] [facility] [address] [phone] [id number]    Roman [employer] Marital Status          [place] Single       Admission Date Admission Type Admitting Provider Attending Provider Department, Room/Bed    1/14/19 Emergency Brooke Stone II, Brooke Jaime II,  [facility] 5G, S549/1    Discharge Date Discharge Disposition Discharge Destination                       Attending Provider:  Brooke Stone II, DO    Allergies:  No Known Allergies    Isolation:  None   Infection:  None   Code Status:  CPR    Ht:  185.4 cm (73\")   Wt:  75.8 kg (167 lb)    Admission Cmt:  None   Principal Problem:  Symptomatic anemia [D64.9]                 Active Insurance as of 1/14/2019     Primary Coverage     Payor Plan Insurance Group Employer/Plan Group    [employer] [place] [employer] [place]      Payor Plan Address Payor Plan Phone Number Payor Plan Fax Number Effective Dates    [address] [address] [phone]  10/7/2018 - None Entered    [facility] [id number]       Subscriber Name Subscriber Birth Date Member ID       TAVO PORTILLO 1980 [id number]                 Emergency Contacts      (Rel.) Home Phone Work Phone Mobile Phone    Beth Garcia (Sister) [phone] -- --    AveryCrystal valdes (Mother) -- -- [phone]    Lianne Hartley (Significant Other) -- -- [phone]            ICU Vital Signs     Row Name 01/15/19 1300 01/15/19 1257 01/15/19 1230 01/15/19 1215 01/15/19 1210       Vitals    Temp  98.7 °F (37.1 °C)  --  97.8 °F (36.6 °C)  97.6 °F (36.4 °C)  --    Temp src  Oral  --  Temporal  Temporal  --    Pulse  96  --  89  84  86    Heart Rate Source  Monitor  --  Monitor  Monitor  Monitor    Resp  18  --  16  18  18    Resp Rate Source  Visual  --  Visual  Visual  Visual    BP  107/67  --  116/81  110/78  " "110/77    Noninvasive MAP (mmHg)  80  --  --  --  --       Oxygen Therapy    SpO2  --  --  99 %  96 %  96 %    Device (Oxygen Therapy)  --  room air  room air  room air  room air    Row Name 01/15/19 1208 01/15/19 1205 01/15/19 1102 01/15/19 1000 01/15/19 0800       Height and Weight    Height  --  --  185.4 cm (73\")  --  --    Height Method  --  --  Stated  --  --    Weight  --  --  75.8 kg (167 lb)  --  --    Weight Method  --  --  Stated  --  --    Ideal Body Weight (IBW) (kg)  --  --  84.86  --  --    BSA (Calculated - sq m)  --  --  1.99 sq meters  --  --    BMI (Calculated)  --  --  22  --  --    Weight in (lb) to have BMI = 25  --  --  189.1  --  --       Vitals    Temp  97.8 °F (36.6 °C)  97.8 °F (36.6 °C)  --  --  --    Temp src  Tympanic  Temporal  --  --  --    Pulse  86  84  95  --  --    Heart Rate Source  Monitor  Monitor  Monitor  --  --    Resp  20  16  15  --  --    Resp Rate Source  --  Visual  Monitor  --  --    BP  111/79  111/79  114/79  --  --    Noninvasive MAP (mmHg)  --  --  89  --  --    BP Location  Left arm  --  Left arm  --  --    BP Method  --  --  Automatic  --  --    Patient Position  --  --  Lying  --  --       Oxygen Therapy    SpO2  97 %  96 %  99 %  --  --    Pulse Oximetry Type  Continuous  --  --  --  --    Device (Oxygen Therapy)  nasal cannula  room air  --  room air  room air       Invasive Hemodynamic Monitoring    Invasive Hemodynamic Monitoring Additional Assessment  --  --  No  --  --       NONinvasive Hemodynamic Monitoring    Noninvasive Hemodynamic Monitoring Additional Assessment  --  --  No  --  --    Row Name 01/15/19 0724 01/15/19 0600 01/15/19 0500 01/15/19 0400 01/15/19 0327       Vitals    Temp  98.1 °F (36.7 °C)  97.8 °F (36.6 °C)  --  --  97.6 °F (36.4 °C)    Temp src  Oral  Oral  --  --  Oral    Pulse  --  85  89  95  99    Heart Rate Source  Monitor  --  --  --  --    Resp  18  20  20  18  18    Resp Rate Source  Visual  --  --  --  --    BP  125/83  115/80  " "111/75  117/77  114/86       Oxygen Therapy    SpO2  --  98 %  97 %  95 %  98 %    Pulse Oximetry Type  --  Continuous  Continuous  Continuous  Continuous    Device (Oxygen Therapy)  --  room air  room air  room air  room air    Row Name 01/15/19 0315 01/15/19 0300 01/15/19 0200 01/15/19 0055 01/15/19 0045       Vitals    Temp  97.7 °F (36.5 °C)  98.1 °F (36.7 °C)  98 °F (36.7 °C)  98.3 °F (36.8 °C)  98.3 °F (36.8 °C)    Temp src  Oral  Oral  Oral  Oral  --    Pulse  90  93  89  107  112    Heart Rate Source  --  --  Monitor  --  --    Resp  18  18  18  18 18    Resp Rate Source  --  --  Visual  --  --    BP  119/81  114/77  115/77  109/80  109/70    Noninvasive MAP (mmHg)  --  --  90  --  --    BP Location  --  --  Left arm  --  --    BP Method  --  --  Automatic  --  --    Patient Position  --  --  Lying  --  --       Oxygen Therapy    SpO2  97 %  97 %  96 %  98 %  98 %    Pulse Oximetry Type  Continuous  Continuous  Continuous  Continuous  --    Device (Oxygen Therapy)  room air  room air  room air  room air  --    Row Name 01/15/19 0000 01/14/19 2202 01/14/19 2119 01/14/19 2013 01/14/19 2003       Vitals    Temp  --  98.2 °F (36.8 °C)  98 °F (36.7 °C)  98.2 °F (36.8 °C)  97.9 °F (36.6 °C)    Temp src  --  --  Oral  Oral  Oral    Pulse  --  103  115  119  119    Resp  --  20  20  20  20    BP  --  114/71  114/71  118/67  105/63       Oxygen Therapy    SpO2  --  98 %  99 %  96 %  98 %    Pulse Oximetry Type  --  --  Continuous  Continuous  Continuous    Device (Oxygen Therapy)  room air  --  room air  room air  room air    Row Name 01/14/19 1940 01/14/19 1915 01/14/19 1733 01/14/19 16:42:53 01/14/19 1632       Height and Weight    Height  --  --  185.4 cm (73\")  --  --    Weight  --  --  76 kg (167 lb 9.6 oz)  --  --    Weight Method  --  --  Standing scale  --  --    Ideal Body Weight (IBW) (kg)  --  --  84.86  --  --    BSA (Calculated - sq m)  --  --  2 sq meters  --  --    BMI (Calculated)  --  --  22.1  --  " "--    Weight in (lb) to have BMI = 25  --  --  189.1  --  --       Vitals    Temp  98.4 °F (36.9 °C)  98.6 °F (37 °C)  98.4 °F (36.9 °C)  97.8 °F (36.6 °C)  98.4 °F (36.9 °C)    Temp src  Oral  Oral  Oral  --  --    Pulse  --  116  107  102  104    Heart Rate Source  Monitor  --  Monitor  --  --    Resp  16  16  16  16  16    Resp Rate Source  Visual  --  Visual  --  --    BP  112/61  114/72  128/78  115/72  115/77    Noninvasive MAP (mmHg)  71  --  --  --  --    BP Location  --  --  Left arm  --  --    BP Method  --  --  Automatic  --  --    Patient Position  --  --  Sitting  --  --       Oxygen Therapy    SpO2  --  99 %  100 %  100 %  100 %    Pulse Oximetry Type  --  Continuous  Continuous  --  --    Device (Oxygen Therapy)  --  room air  --  --  --    Row Name 01/14/19 1530 01/14/19 1500 01/14/19 1459 01/14/19 1451          Vitals    Pulse  109  108  119  --     BP  120/75  127/85  --  123/85     Noninvasive MAP (mmHg)  89  97  --  93        Oxygen Therapy    SpO2  100 %  99 %  100 %  --         Hospital Medications (active)       Dose Frequency Start End    acetaminophen (TYLENOL) tablet 650 mg 650 mg Every 6 Hours PRN 1/14/2019     Sig - Route: Take 2 tablets by mouth Every 6 (Six) Hours As Needed for Mild Pain . - Oral    bisacodyl (DULCOLAX) EC tablet 5 mg 5 mg Daily PRN 1/14/2019     Sig - Route: Take 1 tablet by mouth Daily As Needed for Constipation. - Oral    bisacodyl (DULCOLAX) suppository 10 mg 10 mg Daily PRN 1/14/2019     Sig - Route: Insert 1 suppository into the rectum Daily As Needed for Constipation. - Rectal    folic acid (FOLVITE) tablet 1 mg 1 mg Daily 1/15/2019 1/18/2019    Sig - Route: Take 1 tablet by mouth Daily. - Oral    Linked Group 1:  \"And\" Linked Group Details        lactated ringers infusion 9 mL/hr Continuous 1/15/2019     Sig - Route: Infuse 9 mL/hr into a venous catheter Continuous. - Intravenous    lactated ringers infusion 9 mL/hr Continuous 1/15/2019     Sig - Route: Infuse 9 " "mL/hr into a venous catheter Continuous. - Intravenous    LORazepam (ATIVAN) injection 1 mg 1 mg Every 2 Hours PRN 1/14/2019 1/24/2019    Sig - Route: Infuse 0.5 mL into a venous catheter Every 2 (Two) Hours As Needed for Withdrawal (For CIWA score 8-10). - Intravenous    Linked Group 2:  \"Or\" Linked Group Details        LORazepam (ATIVAN) injection 2 mg 2 mg Every 1 Hour PRN 1/14/2019 1/24/2019    Sig - Route: Infuse 1 mL into a venous catheter Every 1 (One) Hour As Needed for Withdrawal (For CIWA score 11-15). - Intravenous    Linked Group 2:  \"Or\" Linked Group Details        LORazepam (ATIVAN) injection 2 mg 2 mg Every 15 Minutes PRN 1/14/2019 1/24/2019    Sig - Route: Infuse 1 mL into a venous catheter Every 15 (Fifteen) Minutes As Needed for Anxiety (Use IV route first.  If unable to give IV, use IM.  For CIWA-Ar greater than 15.  Repeat dose in 15 minutes if CIWA-Ar is not decreasing.). - Intravenous    Linked Group 2:  \"Or\" Linked Group Details        LORazepam (ATIVAN) injection 2 mg 2 mg Every 15 Minutes PRN 1/14/2019 1/24/2019    Sig - Route: Inject 1 mL into the appropriate muscle as directed by prescriber Every 15 (Fifteen) Minutes As Needed for Withdrawal (Use IV route first.  If unable to give IV, use IM.  For CIWA-Ar greater than 15.  Repeat dose in 15 minutes if CIWA-Ar is not decreasing.). - Intramuscular    Linked Group 2:  \"Or\" Linked Group Details        LORazepam (ATIVAN) tablet 1 mg 1 mg Every 2 Hours PRN 1/14/2019 1/24/2019    Sig - Route: Take 1 tablet by mouth Every 2 (Two) Hours As Needed for Withdrawal (For CIWA score 8-10). - Oral    Linked Group 2:  \"Or\" Linked Group Details        LORazepam (ATIVAN) tablet 2 mg 2 mg Every 1 Hour PRN 1/14/2019 1/24/2019    Sig - Route: Take 2 tablets by mouth Every 1 (One) Hour As Needed for Withdrawal (For CIWA score 11-15). - Oral    Linked Group 2:  \"Or\" Linked Group Details        Magnesium Sulfate 2 gram / 50mL Infusion (GIVE X 3 BAGS TO EQUAL 6GM " "TOTAL DOSE) - Mg 1.1 - 1.5 mg/dl 2 g As Needed 1/14/2019     Sig - Route: Infuse 50 mL into a venous catheter As Needed (See Administration Instructions). - Intravenous    Linked Group 3:  \"Or\" Linked Group Details        Magnesium Sulfate 2 gram Bolus, followed by 8 gram infusion (total Mg dose 10 grams)- Mg less than or equal to 1mg/dL 2 g As Needed 1/14/2019     Sig - Route: Infuse 50 mL into a venous catheter As Needed (See Administration Instructions). - Intravenous    Linked Group 3:  \"Or\" Linked Group Details        Magnesium Sulfate 4 gram infusion- Mg 1.6-1.9 mg/dL 4 g As Needed 1/14/2019     Sig - Route: Infuse 100 mL into a venous catheter As Needed (See Administration Instructions). - Intravenous    Linked Group 3:  \"Or\" Linked Group Details        multiple vitamin (M.V.I. Adult) 10 mL, thiamine (B-1) 100 mg, folic acid 1 mg in Sodium chloride 0.9 % 1,000 mL infusion 100 mL/hr Once 1/14/2019 1/14/2019    Sig - Route: Infuse 100 mL/hr into a venous catheter 1 (One) Time. - Intravenous    multivitamin (THERAGRAN) tablet 1 tablet 1 tablet Daily 1/15/2019 1/18/2019    Sig - Route: Take 1 tablet by mouth Daily. - Oral    Linked Group 1:  \"And\" Linked Group Details        mupirocin (BACTROBAN) 2 % ointment  Every 12 Hours Scheduled 1/14/2019     Sig - Route: Apply  topically to the appropriate area as directed Every 12 (Twelve) Hours. - Topical    ondansetron (ZOFRAN) injection 4 mg 4 mg Every 6 Hours PRN 1/14/2019     Sig - Route: Infuse 2 mL into a venous catheter Every 6 (Six) Hours As Needed for Nausea or Vomiting. - Intravenous    Linked Group 4:  \"Or\" Linked Group Details        ondansetron (ZOFRAN) tablet 4 mg 4 mg Every 6 Hours PRN 1/14/2019     Sig - Route: Take 1 tablet by mouth Every 6 (Six) Hours As Needed for Nausea or Vomiting. - Oral    Linked Group 4:  \"Or\" Linked Group Details        pantoprazole (PROTONIX) injection 40 mg 40 mg Every 12 Hours Scheduled 1/14/2019     Sig - Route: Infuse 10 mL " "into a venous catheter Every 12 (Twelve) Hours. - Intravenous    pantoprazole (PROTONIX) injection 80 mg 80 mg Once 1/14/2019 1/14/2019    Sig - Route: Infuse 20 mL into a venous catheter 1 (One) Time. - Intravenous    phytonadione (AQUA-MEPHYTON, VITAMIN K) 10 mg in Sodium chloride 0.9 % 50 mL IVPB 10 mg Once 1/15/2019 1/15/2019    Sig - Route: Infuse 10 mg into a venous catheter 1 (One) Time. - Intravenous    potassium chloride (KLOR-CON) packet 40 mEq 40 mEq As Needed 1/14/2019     Sig - Route: Take 40 mEq by mouth As Needed (potassium replacement, see admin instructions). - Oral    Linked Group 5:  \"Or\" Linked Group Details        potassium chloride (MICRO-K) CR capsule 40 mEq 40 mEq As Needed 1/14/2019     Sig - Route: Take 4 capsules by mouth As Needed (potassium replacement.  see admin instructions). - Oral    Linked Group 5:  \"Or\" Linked Group Details        potassium chloride 10 mEq in 100 mL IVPB 10 mEq Every 1 Hour PRN 1/14/2019     Sig - Route: Infuse 100 mL into a venous catheter Every 1 (One) Hour As Needed (potassium protocol PERIPHERAL - see admin instructions). - Intravenous    Linked Group 5:  \"Or\" Linked Group Details        sodium chloride 0.9 % flush 10 mL 10 mL As Needed 1/14/2019     Sig - Route: Infuse 10 mL into a venous catheter As Needed for Line Care. - Intravenous    Cosign for Ordering: Required by Fam Ríos MD    sodium chloride 0.9 % flush 3 mL 3 mL Every 12 Hours Scheduled 1/14/2019     Sig - Route: Infuse 3 mL into a venous catheter Every 12 (Twelve) Hours. - Intravenous    sodium chloride 0.9 % flush 3-10 mL 3-10 mL As Needed 1/14/2019     Sig - Route: Infuse 3-10 mL into a venous catheter As Needed for Line Care. - Intravenous    thiamine (VITAMIN B-1) tablet 100 mg 100 mg Daily 1/15/2019 1/18/2019    Sig - Route: Take 1 tablet by mouth Daily. - Oral    Linked Group 1:  \"And\" Linked Group Details        famotidine (PEPCID) injection 20 mg (Discontinued) 20 mg Once 1/15/2019 " "1/15/2019    Sig - Route: Infuse 2 mL into a venous catheter 1 (One) Time. - Intravenous    Reason for Discontinue: Patient Transfer    lidocaine (XYLOCAINE) 1 % injection (Discontinued)  As Needed 1/15/2019 1/15/2019    Sig: As Needed.    Reason for Discontinue: Anesthesia Stop    lidocaine PF 1% (XYLOCAINE) injection 0.5 mL (Discontinued) 0.5 mL Once As Needed 1/15/2019 1/15/2019    Sig - Route: Inject 0.5 mL as directed 1 (One) Time As Needed (IV Start). - Injection    Reason for Discontinue: Patient Transfer    lidocaine PF 1% (XYLOCAINE) injection 0.5 mL (Discontinued) 0.5 mL Once As Needed 1/15/2019 1/15/2019    Sig - Route: Inject 0.5 mL as directed 1 (One) Time As Needed (IV Start). - Injection    Reason for Discontinue: Patient Transfer    multiple vitamin (M.V.I. Adult) 10 mL, thiamine (B-1) 100 mg, folic acid 1 mg, magnesium sulfate 2 g in Sodium chloride 0.9 % 1,000 mL infusion (Discontinued) 1,000 mL/hr Once 1/14/2019 1/14/2019    Sig - Route: Infuse 1,000 mL/hr into a venous catheter 1 (One) Time. - Intravenous    promethazine (PHENERGAN) injection 6.25 mg (Discontinued) 6.25 mg Once As Needed 1/15/2019 1/15/2019    Sig - Route: Infuse 0.25 mL into a venous catheter 1 (One) Time As Needed for Nausea or Vomiting. - Intravenous    Linked Group 6:  \"Or\" Linked Group Details        promethazine (PHENERGAN) injection 6.25 mg (Discontinued) 6.25 mg Once As Needed 1/15/2019 1/15/2019    Sig - Route: Inject 0.25 mL into the appropriate muscle as directed by prescriber 1 (One) Time As Needed for Nausea or Vomiting. - Intramuscular    Linked Group 6:  \"Or\" Linked Group Details        promethazine (PHENERGAN) suppository 25 mg (Discontinued) 25 mg Once As Needed 1/15/2019 1/15/2019    Sig - Route: Insert 1 suppository into the rectum 1 (One) Time As Needed for Nausea or Vomiting. - Rectal    Linked Group 6:  \"Or\" Linked Group Details        promethazine (PHENERGAN) tablet 25 mg (Discontinued) 25 mg Once As Needed " "1/15/2019 1/15/2019    Sig - Route: Take 1 tablet by mouth 1 (One) Time As Needed for Nausea or Vomiting. - Oral    Linked Group 6:  \"Or\" Linked Group Details        Propofol (DIPRIVAN) injection (Discontinued)  As Needed 1/15/2019 1/15/2019    Sig - Route: Infuse  into a venous catheter As Needed. - Intravenous    Reason for Discontinue: Anesthesia Stop    sodium chloride 0.9 % flush 3 mL (Discontinued) 3 mL Every 12 Hours Scheduled 1/15/2019 1/15/2019    Sig - Route: Infuse 3 mL into a venous catheter Every 12 (Twelve) Hours. - Intravenous    Reason for Discontinue: Patient Transfer    sodium chloride 0.9 % flush 3 mL (Discontinued) 3 mL Every 12 Hours Scheduled 1/15/2019 1/15/2019    Sig - Route: Infuse 3 mL into a venous catheter Every 12 (Twelve) Hours. - Intravenous    Reason for Discontinue: Patient Transfer    sodium chloride 0.9 % flush 3-10 mL (Discontinued) 3-10 mL As Needed 1/15/2019 1/15/2019    Sig - Route: Infuse 3-10 mL into a venous catheter As Needed for Line Care. - Intravenous    Reason for Discontinue: Patient Transfer    sodium chloride 0.9 % flush 3-10 mL (Discontinued) 3-10 mL As Needed 1/15/2019 1/15/2019    Sig - Route: Infuse 3-10 mL into a venous catheter As Needed for Line Care. - Intravenous    Reason for Discontinue: Patient Transfer          Lab Results (last 24 hours)     Procedure Component Value Units Date/Time    Tissue Pathology Exam [[id number]] Collected:  01/15/19 1153    Specimen:  Tissue from Small Intestine, Duodenum; Tissue from Gastric, Antrum; Tissue from Stomach Updated:  01/15/19 1321    CBC (No Diff) [[id number]]  (Abnormal) Collected:  01/15/19 1014    Specimen:  Blood Updated:  01/15/19 1246     WBC 6.24 10*3/mm3      RBC 3.94 10*6/mm3      Hemoglobin 8.1 g/dL      Hematocrit 27.5 %      MCV 69.8 fL      MCH 20.6 pg      MCHC 29.5 g/dL      RDW 27.8 %      RDW-SD 68.7 fl      Platelets 209 10*3/mm3     Narrative:       MPV not calculated by analzyer    Lipase " [[id number]]  (Abnormal) Collected:  01/15/19 1014    Specimen:  Blood Updated:  01/15/19 1145     Lipase 57 U/L     Comprehensive Metabolic Panel [[id number]]  (Abnormal) Collected:  01/15/19 1014    Specimen:  Blood Updated:  01/15/19 1131     Glucose 101 mg/dL      BUN 6 mg/dL      Creatinine 0.54 mg/dL      Sodium 135 mmol/L      Potassium 4.1 mmol/L      Chloride 107 mmol/L      CO2 23.0 mmol/L      Calcium 8.0 mg/dL      Total Protein 6.0 g/dL      Albumin 3.44 g/dL      ALT (SGPT) 15 U/L      AST (SGOT) 16 U/L      Alkaline Phosphatase 81 U/L      Total Bilirubin 1.3 mg/dL      eGFR Non African Amer >150 mL/min/1.73      Globulin 2.6 gm/dL      A/G Ratio 1.3 g/dL      BUN/Creatinine Ratio 11.1     Anion Gap 5.0 mmol/L     Narrative:       National Kidney Foundation Guidelines    Stage     Description        GFR  1         Normal or High     90+  2         Mild decrease      60-89  3         Moderate decrease  30-59  4         Severe decrease    15-29  5         Kidney failure     <15    The MDRD GFR formula is only valid for adults with stable renal function between ages 18 and 70.    Magnesium [[id number]]  (Normal) Collected:  01/15/19 1014    Specimen:  Blood Updated:  01/15/19 1131     Magnesium 1.7 mg/dL     Reticulocytes [[id number]]  (Normal) Collected:  01/14/19 2318    Specimen:  Blood Updated:  01/15/19 1037     Reticulocyte % 1.29 %     Hemoglobin & Hematocrit, Blood [[id number]]  (Abnormal) Collected:  01/14/19 2318    Specimen:  Blood Updated:  01/15/19 0006     Hemoglobin 6.7 g/dL      Hematocrit 23.9 %     Urine Drug Screen - Urine, Clean Catch [[id number]]  (Normal) Collected:  01/14/19 1627    Specimen:  Urine, Clean Catch Updated:  01/14/19 1702     THC, Screen, Urine Negative     Phencyclidine (PCP), Urine Negative     Cocaine Screen, Urine Negative     Methamphetamine, Urine Negative     Opiate Screen Negative     Amphetamine Screen, Urine Negative     Benzodiazepine Screen, Urine Negative      Tricyclic Antidepressants Screen Negative     Methadone Screen, Urine Negative     Barbiturates Screen, Urine Negative     Oxycodone Screen, Urine Negative     Propoxyphene Screen Negative     Buprenorphine, Screen, Urine Negative    Narrative:       Cutoff For Drugs Screened:    Amphetamines               500 ng/ml  Barbiturates               200 ng/ml  Benzodiazepines            150 ng/ml  Cocaine                    150 ng/ml  Methadone                  200 ng/ml  Opiates                    100 ng/ml  Phencyclidine               25 ng/ml  THC                            50 ng/ml  Methamphetamine            500 ng/ml  Tricyclic Antidepressants  300 ng/ml  Oxycodone                  100 ng/ml  Propoxyphene               300 ng/ml  Buprenorphine               10 ng/ml    The normal value for all drugs tested is negative. This report includes unconfirmed screening results, with the cutoff values listed, to be used for medical treatment purposes only.  Unconfirmed results must not be used for non-medical purposes such as employment or legal testing.  Clinical consideration should be applied to any drug of abuse test, particularly when unconfirmed results are used.      Urinalysis With Microscopic If Indicated (No Culture) - Urine, Clean Catch [[id number]]  (Normal) Collected:  01/14/19 1627    Specimen:  Urine, Clean Catch Updated:  01/14/19 1654     Color, UA Yellow     Appearance, UA Clear     pH, UA 6.0     Specific Gravity, UA 1.009     Glucose, UA Negative     Ketones, UA Negative     Bilirubin, UA Negative     Blood, UA Negative     Protein, UA Negative     Leuk Esterase, UA Negative     Nitrite, UA Negative     Urobilinogen, UA 0.2 E.U./dL    Narrative:       Urine microscopic not indicated.    CBC Auto Differential [[id number]]  (Abnormal) Collected:  01/14/19 1314    Specimen:  Blood Updated:  01/14/19 1540     WBC 5.88 10*3/mm3      RBC 2.85 10*6/mm3      Hemoglobin 4.0 g/dL      Hematocrit 16.7 %      MCV 58.6  fL      MCH 14.0 pg      MCHC 24.0 g/dL      RDW 21.5 %      RDW-SD 44.5 fl      MPV 9.1 fL      Platelets 259 10*3/mm3      Neutrophil % 61.2 %      Lymphocyte % 31.5 %      Monocyte % 6.6 %      Eosinophil % 0.5 %      Basophil % 0.2 %      Immature Grans % 0.2 %      Neutrophils, Absolute 3.60 10*3/mm3      Lymphocytes, Absolute 1.85 10*3/mm3      Monocytes, Absolute 0.39 10*3/mm3      Eosinophils, Absolute 0.03 10*3/mm3      Basophils, Absolute 0.01 10*3/mm3      Immature Grans, Absolute 0.01 10*3/mm3     Narrative:       Appended report. These results have been appended to a previously verified report.    Scan Slide [[id number]] Collected:  01/14/19 1314    Specimen:  Blood Updated:  01/14/19 1540     Hypochromia Large/3+     Microcytes Large/3+     Schistocytes Slight/1+     Stomatocytes Slight/1+     WBC Morphology Normal     Platelet Morphology Normal    Hepatitis Panel, Acute [[id number]]  (Normal) Collected:  01/14/19 1314    Specimen:  Blood Updated:  01/14/19 1457     Hepatitis B Surface Ag Non-Reactive     Hep A IgM Non-Reactive     Comment: Results may be falsely decreased if patient taking Biotin.        Hep B C IgM Non-Reactive     Comment: Results may be falsely decreased if patient taking Biotin.        Hepatitis C Ab Non-Reactive    Comprehensive Metabolic Panel [[id number]]  (Abnormal) Collected:  01/14/19 1314    Specimen:  Blood Updated:  01/14/19 1421     Glucose 124 mg/dL      BUN 6 mg/dL      Creatinine 0.65 mg/dL      Sodium 130 mmol/L      Potassium 3.5 mmol/L      Chloride 100 mmol/L      CO2 23.0 mmol/L      Calcium 8.6 mg/dL      Total Protein 7.2 g/dL      Albumin 4.04 g/dL      ALT (SGPT) 19 U/L      AST (SGOT) 20 U/L      Alkaline Phosphatase 92 U/L      Total Bilirubin 0.8 mg/dL      eGFR Non African Amer 137 mL/min/1.73      Globulin 3.2 gm/dL      A/G Ratio 1.3 g/dL      BUN/Creatinine Ratio 9.2     Anion Gap 7.0 mmol/L     Narrative:       National Kid[employer]  Guidelines    Stage     Description        GFR  1         Normal or High     90+  2         Mild decrease      60-89  3         Moderate decrease  30-59  4         Severe decrease    15-29  5         Kidney failure     <15    The MDRD GFR formula is only valid for adults with stable renal function between ages 18 and 70.    Lipase [[id number]]  (Abnormal) Collected:  01/14/19 1314    Specimen:  Blood Updated:  01/14/19 1421     Lipase 54 U/L     Ethanol [[id number]]  (Abnormal) Collected:  01/14/19 1314    Specimen:  Blood Updated:  01/14/19 1420     Ethanol 122 mg/dL     Acetaminophen Level [[id number]]  (Normal) Collected:  01/14/19 1314    Specimen:  Blood Updated:  01/14/19 1420     Acetaminophen <10.0 mcg/mL     Tilly Draw [[id number]] Collected:  01/14/19 1314    Specimen:  Blood Updated:  01/14/19 1417    Narrative:       The following orders were created for panel order Tilly Draw.  Procedure                               Abnormality         Status                     ---------                               -----------         ------                     Light Blue Top[[id number]]                                   Final result               Green Top (Gel)[[id number]]                                  Final result               Lavender Top[[id number]]                                     Final result               Gold Top - SST[[id number]]                                   Final result               Green Top (No Gel)[[id number]]                                                            Please view results for these tests on the individual orders.    Light Blue Top [[id number]] Collected:  01/14/19 1314    Specimen:  Blood Updated:  01/14/19 1415     Extra Tube hold for add-on     Comment: Auto resulted       Green Top (Gel) [[id number]] Collected:  01/14/19 1314    Specimen:  Blood Updated:  01/14/19 1415     Extra Tube Hold for add-ons.     Comment: Auto resulted.       Lavender Top [[id number]] Collected:   19    Specimen:  Blood Updated:  19     Extra Tube hold for add-on     Comment: Auto resulted       Gold Top - SST [[id number]] Collected:  19    Specimen:  Blood Updated:  19     Extra Tube Hold for add-ons.     Comment: Auto resulted.       Protime-INR [[id number]]  (Abnormal) Collected:  19    Specimen:  Blood Updated:  19 135     Protime 15.8 Seconds      INR 1.32    aPTT [[id number]]  (Normal) Collected:  19    Specimen:  Blood Updated:  19     PTT 29.6 seconds     Narrative:       PTT = The equivalent PTT values for the therapeutic range of heparin levels at 0.3 to 0.5 U/ml are 55 to 70 seconds.        Imaging Results (last 24 hours)     Procedure Component Value Units Date/Time    XR Chest 2 View [[id number]] Collected:  19 1450     Updated:  19    Narrative:       EXAMINATION: XR CHEST 2 VW-2019:      INDICATION: Weak.      COMPARISON: NONE.     FINDINGS: The heart size is normal. There is no pulmonary inflammatory  process. There is no mass or effusion.           Impression:       No active disease.     D:  2019  E:  2019     This report was finalized on 2019 4:16 PM by Dr. Roc Ruiz MD.              Physician Progress Notes (last 24 hours) (Notes from 2019  1:24 PM through 1/15/2019  1:24 PM)      Brooke Stone II, DO at 1/15/2019  8:00 AM              [facility] Medicine Services  PROGRESS NOTE    Patient Name: Tavo Murrieta  : 1980  MRN: [id number]    Date of Admission: 2019  Length of Stay: 1  Primary Care Physician: Duc Pena MD    Subjective   Subjective     CC: f/u dizziness    HPI: Resting comfortably in bed but upon waking him he says he feels jittery. Denies hallucinations. No signs of bleeding.     Review of Systems  Gen- No fevers, chills  CV- No chest pain, palpitations  Resp- No cough, dyspnea  GI- No N/V/D, abd  pain    Otherwise ROS is negative except as mentioned in the HPI.    Objective   Objective     Vital Signs:   Temp:  [97.6 °F (36.4 °C)-98.6 °F (37 °C)] 98.1 °F (36.7 °C)  Heart Rate:  [] 95  Resp:  [14-20] 15  BP: (105-132)/(61-86) 114/79        Physical Exam:  Constitutional: No acute distress, awake, alert, chronically ill appearing, appears older than stated age  HENT: NCAT, mucous membranes moist  Respiratory: Clear to auscultation bilaterally, respiratory effort normal   Cardiovascular: RRR, no murmurs, rubs, or gallops, palpable pedal pulses bilaterally  Gastrointestinal: Positive bowel sounds, soft, nontender, nondistended  Musculoskeletal: No bilateral ankle edema  Psychiatric: Appropriate affect, cooperative  Neurologic: Oriented x 3, strength symmetric in all extremities, Cranial Nerves grossly intact to confrontation, speech clear  Skin: No rashes    Results Reviewed:  I have personally reviewed current lab, radiology, and data and agree.    Results from last 7 days   Lab Units  01/14/19   2318  01/14/19   1314   WBC 10*3/mm3   --   5.88   HEMOGLOBIN g/dL  6.7*  4.0*   HEMATOCRIT %  23.9*  16.7*   PLATELETS 10*3/mm3   --   259   INR    --   1.32*     Results from last 7 days   Lab Units  01/15/19   1014  01/14/19   1314   SODIUM mmol/L  135  130*   POTASSIUM mmol/L  4.1  3.5   CHLORIDE mmol/L  107  100   CO2 mmol/L  23.0  23.0   BUN mg/dL  6*  6*   CREATININE mg/dL  0.54*  0.65   GLUCOSE mg/dL  101*  124*   CALCIUM mg/dL  8.0*  8.6*   ALT (SGPT) U/L  15  19   AST (SGOT) U/L  16  20     Estimated Creatinine Clearance: 196.9 mL/min (A) (by C-G formula based on SCr of 0.54 mg/dL (L)).    No results found for: BNP    Microbiology Results Abnormal     None        CXR personally reviewed without acute disease. Agree with interpretation.   Imaging Results (last 24 hours)     Procedure Component Value Units Date/Time    XR Chest 2 View [[id number]] Collected:  01/14/19 1450     Updated:  01/14/19 1618     Narrative:       EXAMINATION: XR CHEST 2 VW-01/14/2019:      INDICATION: Weak.      COMPARISON: NONE.     FINDINGS: The heart size is normal. There is no pulmonary inflammatory  process. There is no mass or effusion.           Impression:       No active disease.     D:  01/14/2019  E:  01/14/2019     This report was finalized on 1/14/2019 4:16 PM by Dr. Roc Ruiz MD.             Results for orders placed during the hospital encounter of 10/07/18   Adult Transthoracic Echo Complete W/ Cont if Necessary Per Protocol    Narrative · Left ventricular systolic function is normal. Estimated EF appears to be   in the range of 61 - 65%. Normal left ventricular cavity size and wall   thickness noted. Left ventricular diastolic function is normal.     No significant valvular heart disease noted.  Essentially normal cardiac structure and function.         I have reviewed the medications:    Current Facility-Administered Medications:   •  acetaminophen (TYLENOL) tablet 650 mg, 650 mg, Oral, Q6H PRN, Sandra Mckeon MD  •  bisacodyl (DULCOLAX) EC tablet 5 mg, 5 mg, Oral, Daily PRN, Sandra Mckeon MD  •  bisacodyl (DULCOLAX) suppository 10 mg, 10 mg, Rectal, Daily PRN, Sandra Mckeon MD  •  thiamine (VITAMIN B-1) tablet 100 mg, 100 mg, Oral, Daily, 100 mg at 01/15/19 0804 **AND** multivitamin (THERAGRAN) tablet 1 tablet, 1 tablet, Oral, Daily, 1 tablet at 01/15/19 0804 **AND** folic acid (FOLVITE) tablet 1 mg, 1 mg, Oral, Daily, Sandra Mckeon MD, 1 mg at 01/15/19 0804  •  lactated ringers infusion, 9 mL/hr, Intravenous, Continuous, Steven Chatman MD  •  lidocaine PF 1% (XYLOCAINE) injection 0.5 mL, 0.5 mL, Injection, Once PRN, Steven Chatman MD  •  LORazepam (ATIVAN) tablet 1 mg, 1 mg, Oral, Q2H PRN, 1 mg at 01/14/19 2001 **OR** LORazepam (ATIVAN) injection 1 mg, 1 mg, Intravenous, Q2H PRN, 1 mg at 01/15/19 0809 **OR** LORazepam (ATIVAN) tablet 2 mg, 2 mg, Oral, Q1H PRN, 2 mg at 01/14/19 2222 **OR** LORazepam  (ATIVAN) injection 2 mg, 2 mg, Intravenous, Q1H PRN, 2 mg at 01/15/19 0328 **OR** LORazepam (ATIVAN) injection 2 mg, 2 mg, Intravenous, Q15 Min PRN **OR** LORazepam (ATIVAN) injection 2 mg, 2 mg, Intramuscular, Q15 Min PRN, Sandra Mckeon MD  •  Magnesium Sulfate 2 gram Bolus, followed by 8 gram infusion (total Mg dose 10 grams)- Mg less than or equal to 1mg/dL, 2 g, Intravenous, PRN **OR** Magnesium Sulfate 2 gram / 50mL Infusion (GIVE X 3 BAGS TO EQUAL 6GM TOTAL DOSE) - Mg 1.1 - 1.5 mg/dl, 2 g, Intravenous, PRN **OR** Magnesium Sulfate 4 gram infusion- Mg 1.6-1.9 mg/dL, 4 g, Intravenous, PRN, Sandra Mckeon MD  •  mupirocin (BACTROBAN) 2 % ointment, , Topical, Q12H, Sandra Mckeon MD  •  ondansetron (ZOFRAN) tablet 4 mg, 4 mg, Oral, Q6H PRN **OR** ondansetron (ZOFRAN) injection 4 mg, 4 mg, Intravenous, Q6H PRN, Sandra Mckeon MD  •  pantoprazole (PROTONIX) injection 40 mg, 40 mg, Intravenous, Q12H, Sandra Mckeon MD, 40 mg at 01/15/19 0804  •  phytonadione (AQUA-MEPHYTON, VITAMIN K) 10 mg in Sodium chloride 0.9 % 50 mL IVPB, 10 mg, Intravenous, Once, Cristel Su PA, Last Rate: 50 mL/hr at 01/15/19 1044, 10 mg at 01/15/19 1044  •  potassium chloride (MICRO-K) CR capsule 40 mEq, 40 mEq, Oral, PRN, 40 mEq at 01/14/19 3774 **OR** potassium chloride (KLOR-CON) packet 40 mEq, 40 mEq, Oral, PRN **OR** potassium chloride 10 mEq in 100 mL IVPB, 10 mEq, Intravenous, Q1H PRN, Sandra Mckeon MD  •  sodium chloride 0.9 % flush 10 mL, 10 mL, Intravenous, PRN, Fam Ríos MD  •  sodium chloride 0.9 % flush 3 mL, 3 mL, Intravenous, Q12H, Sandra Mckeon MD, 3 mL at 01/15/19 0811  •  sodium chloride 0.9 % flush 3 mL, 3 mL, Intravenous, Q12H, Steven Chatman MD  •  sodium chloride 0.9 % flush 3-10 mL, 3-10 mL, Intravenous, PRN, Sandra Mckeon MD  •  sodium chloride 0.9 % flush 3-10 mL, 3-10 mL, Intravenous, PRN, Steven Chatman MD      Assessment/Plan   Assessment / Plan     Active Hospital  Problems    Diagnosis Date Noted   • **Symptomatic anemia [D64.9] 01/14/2019   • Alcoholism /alcohol abuse (CMS/HCC) [F10.20] 01/14/2019   • Hyponatremia [E87.1] 01/14/2019   • history of Seizure due to alcohol withdrawal (CMS/HCC) [F10.239, R56.9] 01/14/2019   • Viral upper respiratory tract infection [J06.9] 01/14/2019          Brief Hospital Course to date:  Tavo Murrieta is a 38 y.o. male with ongoing ETOH abuse presenting from home with symptomatic anemia.     A/P:  --Unclear as to cause of his severe TABITHA. Has hx of GIB related to PUD in past. Has received 4 units prbcs. No obvious bleeding at this time. D/W GI, for EGD this am then will need PO iron at d/c.  --Ativan per [facility] protocol for ETOH w/drawal.   --Continue serial h/h with transfusions as needed.    **Needs outpatient ETOH counseling/treatment. D/W SW this am    DVT Prophylaxis: Mechanical    Disposition: I expect the patient to be discharged home in 1-2 days.    CODE STATUS:   Code Status and Medical Interventions:   Ordered at: 01/14/19 3951     Code Status:    CPR     Medical Interventions (Level of Support Prior to Arrest):    Full         Electronically signed by Brooke Stone II, DO, 01/15/19, 11:40 AM.      Electronically signed by Brooke Stone II, DO at 1/15/2019 11:48 AM          Consult Notes (last 24 hours) (Notes from 1/14/2019  1:24 PM through 1/15/2019  1:24 PM)      Cristel Su PA at 1/15/2019  9:34 AM      Consult Orders    1. Inpatient Gastroenterology Consult [[id number]] ordered by Sandra Mckeon MD at 01/14/19 9876           Attestation signed by Brunner, Mark I, MD at 1/15/2019 11:45 AM    I have reviewed the documentation above and agree. Patient seen and examined. He states he had stomach ulcers cauterized in the past. Skin appears pale on exam.                    [facility] Gastroenterology Consult    Referring Provider: Sandra Mckeon MD    PCP: Duc Pena MD    Reason for Consultation: Symptomatic anemia      Chief complaint: Fatigue and dizziness     History of present illness:    Tavo Murrieta is a 38 y.o. male who is admitted with severe symptomatic anemia.   He has a history of alcohol abuse and dependence as well as recent GI bleeding.   He previously drank 1 pint of vodka daily; he has undergone 2 weeks of rehab but continues to drink 3-4 beers daily.  He was evaluated at [facility] in October 2018 for anemia and underwent EGD/Colonoscopy that showed evidence of blood in the stomach without clear source.   Antrum and cecum biopsies were benign.     He had melena two months ago and had a repeat EGD in [place] that reportedly showed gastric ulcers.  He denies any PPI use nor NSAIDs.   He has had worsening fatigue, dizziness upon standing and lightheadedness.   He has nausea without emesis.    Labs revealed severe anemia (H&H 4 and 16, MCV 58.6).      Allergies:  Patient has no known allergies.    Scheduled Meds:    vitamin B-1 100 mg Oral Daily   And      multivitamin 1 tablet Oral Daily   And      folic acid 1 mg Oral Daily   mupirocin  Topical Q12H   pantoprazole 40 mg Intravenous Q12H   sodium chloride 3 mL Intravenous Q12H        Infusions:       PRN Meds:  •  acetaminophen  •  bisacodyl  •  bisacodyl  •  LORazepam **OR** LORazepam **OR** LORazepam **OR** LORazepam **OR** LORazepam **OR** LORazepam  •  magnesium sulfate **OR** magnesium sulfate **OR** magnesium sulfate  •  ondansetron **OR** ondansetron  •  potassium chloride **OR** potassium chloride **OR** potassium chloride  •  sodium chloride  •  sodium chloride    Home Meds:  No medications prior to admission.       ROS: Review of Systems   Constitutional: Positive for activity change and fatigue.   HENT: Negative for trouble swallowing and voice change.    Eyes: Negative.    Respiratory: Positive for shortness of breath.    Cardiovascular: Positive for palpitations.   Gastrointestinal: Positive for nausea. Negative for abdominal pain.        Melena  one month ago.   Bright red blood on toilet paper several weeks ago.   No recent evidence of bleeding.     Genitourinary: Negative.    Musculoskeletal: Negative.    Skin: Positive for pallor.   Neurological: Positive for dizziness, weakness and light-headedness.   Hematological: Negative.    Psychiatric/Behavioral:        Depression       PAST MED HX:  Past Medical History:   Diagnosis Date   • Alcohol abuse    • Seizure due to alcohol withdrawal (CMS/HCC)    • Seizures (CMS/HCC)        PAST SURG HX:  Past Surgical History:   Procedure Laterality Date   • COLONOSCOPY N/A 10/8/2018    Procedure: COLONOSCOPY;  Surgeon: Parish Owen MD;  Location: [facility];  Service: Gastroenterology   • ENDOSCOPY N/A 10/8/2018    Procedure: ESOPHAGOGASTRODUODENOSCOPY;  Surgeon: Parish Owen MD;  Location: [facility];  Service: Gastroenterology       FAM HX:  Family History   Problem Relation Age of Onset   • Hypertension Mother        SOC HX:  Social History     Socioeconomic History   • Marital status: Single     Spouse name: Not on file   • Number of children: Not on file   • Years of education: Not on file   • Highest education level: Not on file   Social Needs   • Financial resource strain: Not on file   • Food insecurity - worry: Not on file   • Food insecurity - inability: Not on file   • Transportation needs - medical: Not on file   • Transportation needs - non-medical: Not on file   Occupational History   • Not on file   Tobacco Use   • Smoking status: Never Smoker   • Smokeless tobacco: Current User     Types: Snuff   Substance and Sexual Activity   • Alcohol use: Yes     Drinks per session: 3 or 4     Comment: states 3-4 beers per day, down from 1 pint vodka daily   • Drug use: No   • Sexual activity: Defer   Other Topics Concern   • Not on file   Social History Narrative    Hospitalization [place] 10/2018    Hospitalization [facility] [place]       PHYSICAL EXAM  /83   Pulse 85   Temp 98.1 °F (36.7 °C) (Oral)    "Resp 18   Ht 185.4 cm (73\")   Wt 76 kg (167 lb 9.6 oz)   SpO2 98%   BMI 22.11 kg/m²    Wt Readings from Last 3 Encounters:   01/14/19 76 kg (167 lb 9.6 oz)   10/12/18 78.6 kg (173 lb 3.2 oz)   ,body mass index is 22.11 kg/m².  Physical Exam   Constitutional: He is oriented to person, place, and time. He appears well-nourished. No distress.   HENT:   Head: Normocephalic and atraumatic.   Eyes: No scleral icterus.   Neck: Normal range of motion.   Cardiovascular: Tachycardia present.   Pulmonary/Chest: Effort normal. No respiratory distress.   Abdominal: Soft. Bowel sounds are normal. He exhibits no distension. There is no tenderness.   Musculoskeletal: Normal range of motion. He exhibits no edema.   Neurological: He is alert and oriented to person, place, and time.   Skin: Skin is warm and dry. There is pallor.   Psychiatric:   Depressed mood        Results Review:   I reviewed the patient's new clinical results.    Lab Results   Component Value Date    WBC 5.88 01/14/2019    HGB 6.7 (L) 01/14/2019    HGB 4.0 (C) 01/14/2019    HGB 7.0 (L) 10/12/2018    HCT 23.9 (L) 01/14/2019    MCV 58.6 (L) 01/14/2019     01/14/2019       Lab Results   Component Value Date    INR 1.32 (H) 01/14/2019    INR 1.29 (H) 10/08/2018    INR 1.30 (H) 10/07/2018       Lab Results   Component Value Date    GLUCOSE 124 (H) 01/14/2019    BUN 6 (L) 01/14/2019    CREATININE 0.65 01/14/2019    EGFRIFNONA 137 01/14/2019    BCR 9.2 01/14/2019    CO2 23.0 01/14/2019    CALCIUM 8.6 (L) 01/14/2019    ALBUMIN 4.04 01/14/2019    ALKPHOS 92 01/14/2019    BILITOT 0.8 01/14/2019    ALT 19 01/14/2019    AST 20 01/14/2019     10/8/2018   Iron 17.   Ferritin 20.  Iron saturation 5%.       Vitamin B12 553     Folate 5.60    ASSESSMENTS/PLANS    1. Symptomatic anemia, microcytic  2. Iron deficiency  3. Coagulopathy  4. Alcohol abuse and dependence  5. History of gastric ulcers ?   6. Alcohol fatty liver    >>> Possible upper GI source of bleeding.   " Recommend EGD today with small bowel biopsies (rule out Celiac)   >>> IV BID PPI  >>> Will check Reticulocyte count   >>> IV Vitamin K+ 10mg x 1.   Repeat INR tomorrow   >>> Close monitoring for alcohol withdrawal.   Follow [facility] protocol     I discussed the patients findings and my recommendations with patient    ANIBAL Stafford  01/15/19  9:34 AM      Electronically signed by Brunner, Mark I, MD at 1/15/2019 11:45 AM

## 2019-01-15 NOTE — CONSULTS
[facility] Gastroenterology Consult    Referring Provider: Sandra Mckeon MD    PCP: Duc Pena MD    Reason for Consultation: Symptomatic anemia     Chief complaint: Fatigue and dizziness     History of present illness:    Tavo Murrieta is a 38 y.o. male who is admitted with severe symptomatic anemia.   He has a history of alcohol abuse and dependence as well as recent GI bleeding.   He previously drank 1 pint of vodka daily; he has undergone 2 weeks of rehab but continues to drink 3-4 beers daily.  He was evaluated at [facility] in October 2018 for anemia and underwent EGD/Colonoscopy that showed evidence of blood in the stomach without clear source.   Antrum and cecum biopsies were benign.     He had melena two months ago and had a repeat EGD in [place] that reportedly showed gastric ulcers.  He denies any PPI use nor NSAIDs.   He has had worsening fatigue, dizziness upon standing and lightheadedness.   He has nausea without emesis.    Labs revealed severe anemia (H&H 4 and 16, MCV 58.6).      Allergies:  Patient has no known allergies.    Scheduled Meds:    vitamin B-1 100 mg Oral Daily   And      multivitamin 1 tablet Oral Daily   And      folic acid 1 mg Oral Daily   mupirocin  Topical Q12H   pantoprazole 40 mg Intravenous Q12H   sodium chloride 3 mL Intravenous Q12H        Infusions:       PRN Meds:  •  acetaminophen  •  bisacodyl  •  bisacodyl  •  LORazepam **OR** LORazepam **OR** LORazepam **OR** LORazepam **OR** LORazepam **OR** LORazepam  •  magnesium sulfate **OR** magnesium sulfate **OR** magnesium sulfate  •  ondansetron **OR** ondansetron  •  potassium chloride **OR** potassium chloride **OR** potassium chloride  •  sodium chloride  •  sodium chloride    Home Meds:  No medications prior to admission.       ROS: Review of Systems   Constitutional: Positive for activity change and fatigue.   HENT: Negative for trouble swallowing and voice change.    Eyes: Negative.    Respiratory: Positive for  shortness of breath.    Cardiovascular: Positive for palpitations.   Gastrointestinal: Positive for nausea. Negative for abdominal pain.        Melena one month ago.   Bright red blood on toilet paper several weeks ago.   No recent evidence of bleeding.     Genitourinary: Negative.    Musculoskeletal: Negative.    Skin: Positive for pallor.   Neurological: Positive for dizziness, weakness and light-headedness.   Hematological: Negative.    Psychiatric/Behavioral:        Depression       PAST MED HX:  Past Medical History:   Diagnosis Date   • Alcohol abuse    • Seizure due to alcohol withdrawal (CMS/HCC)    • Seizures (CMS/HCC)        PAST SURG HX:  Past Surgical History:   Procedure Laterality Date   • COLONOSCOPY N/A 10/8/2018    Procedure: COLONOSCOPY;  Surgeon: Parish Owen MD;  Location: [facility] OR;  Service: Gastroenterology   • ENDOSCOPY N/A 10/8/2018    Procedure: ESOPHAGOGASTRODUODENOSCOPY;  Surgeon: Parish Owen MD;  Location: [facility];  Service: Gastroenterology       FAM HX:  Family History   Problem Relation Age of Onset   • Hypertension Mother        SOC HX:  Social History     Socioeconomic History   • Marital status: Single     Spouse name: Not on file   • Number of children: Not on file   • Years of education: Not on file   • Highest education level: Not on file   Social Needs   • Financial resource strain: Not on file   • Food insecurity - worry: Not on file   • Food insecurity - inability: Not on file   • Transportation needs - medical: Not on file   • Transportation needs - non-medical: Not on file   Occupational History   • Not on file   Tobacco Use   • Smoking status: Never Smoker   • Smokeless tobacco: Current User     Types: Snuff   Substance and Sexual Activity   • Alcohol use: Yes     Drinks per session: 3 or 4     Comment: states 3-4 beers per day, down from 1 pint vodka daily   • Drug use: No   • Sexual activity: Defer   Other Topics Concern   • Not on file   Social History  "Narrative    Hospitalization [place] 10/2018    [place] [facility] [place]       PHYSICAL EXAM  /83   Pulse 85   Temp 98.1 °F (36.7 °C) (Oral)   Resp 18   Ht 185.4 cm (73\")   Wt 76 kg (167 lb 9.6 oz)   SpO2 98%   BMI 22.11 kg/m²   Wt Readings from Last 3 Encounters:   01/14/19 76 kg (167 lb 9.6 oz)   10/12/18 78.6 kg (173 lb 3.2 oz)   ,body mass index is 22.11 kg/m².  Physical Exam   Constitutional: He is oriented to person, place, and time. He appears well-nourished. No distress.   HENT:   Head: Normocephalic and atraumatic.   Eyes: No scleral icterus.   Neck: Normal range of motion.   Cardiovascular: Tachycardia present.   Pulmonary/Chest: Effort normal. No respiratory distress.   Abdominal: Soft. Bowel sounds are normal. He exhibits no distension. There is no tenderness.   Musculoskeletal: Normal range of motion. He exhibits no edema.   Neurological: He is alert and oriented to person, place, and time.   Skin: Skin is warm and dry. There is pallor.   Psychiatric:   Depressed mood        Results Review:   I reviewed the patient's new clinical results.    Lab Results   Component Value Date    WBC 5.88 01/14/2019    HGB 6.7 (L) 01/14/2019    HGB 4.0 (C) 01/14/2019    HGB 7.0 (L) 10/12/2018    HCT 23.9 (L) 01/14/2019    MCV 58.6 (L) 01/14/2019     01/14/2019       Lab Results   Component Value Date    INR 1.32 (H) 01/14/2019    INR 1.29 (H) 10/08/2018    INR 1.30 (H) 10/07/2018       Lab Results   Component Value Date    GLUCOSE 124 (H) 01/14/2019    BUN 6 (L) 01/14/2019    CREATININE 0.65 01/14/2019    EGFRIFNONA 137 01/14/2019    BCR 9.2 01/14/2019    CO2 23.0 01/14/2019    CALCIUM 8.6 (L) 01/14/2019    ALBUMIN 4.04 01/14/2019    ALKPHOS 92 01/14/2019    BILITOT 0.8 01/14/2019    ALT 19 01/14/2019    AST 20 01/14/2019     10/8/2018   Iron 17.   Ferritin 20.  Iron saturation 5%.       Vitamin B12 553     Folate 5.60    ASSESSMENTS/PLANS    1. Symptomatic anemia, microcytic  2. Iron " deficiency  3. Coagulopathy  4. Alcohol abuse and dependence  5. History of gastric ulcers ?   6. Alcohol fatty liver    >>> Possible upper GI source of bleeding.   Recommend EGD today with small bowel biopsies (rule out Celiac)   >>> IV BID PPI  >>> Will check Reticulocyte count   >>> IV Vitamin K+ 10mg x 1.   Repeat INR tomorrow   >>> Close monitoring for alcohol withdrawal.   Follow [facility] protocol     I discussed the patients findings and my recommendations with patient    ANIBAL Stafford  01/15/19  9:34 AM

## 2019-01-15 NOTE — ANESTHESIA PREPROCEDURE EVALUATION
Anesthesia Evaluation     NPO Solid Status: > 8 hours  NPO Liquid Status: > 8 hours           Airway   Mallampati: II  TM distance: >3 FB  Neck ROM: full  No difficulty expected  Dental - normal exam     Pulmonary    (+) decreased breath sounds,   (-) asthma, not a smoker  Cardiovascular     ECG reviewed  Rhythm: regular  Rate: normal    (-) angina, cardiac stents, CABG      Neuro/Psych  (+) seizures,     GI/Hepatic/Renal/Endo    (-) no renal disease, diabetes    Musculoskeletal     Abdominal    Substance History      OB/GYN          Other        ROS/Med Hx Other: Alcoholic admitted in October with hct of 15.  Now readmitted with hct 23 after 2 units.  Seizure precautions.    Anemia  URI  Ulcers by history                   Anesthesia Plan    ASA 4     intravenous induction     Plan discussed with CRNA.

## 2019-01-15 NOTE — PLAN OF CARE
Problem: Fall Risk (Adult)  Goal: Absence of Fall  Outcome: Ongoing (interventions implemented as appropriate)      Problem: Alcohol Withdrawal Acute, Risk/Actual (Adult)  Goal: Signs and Symptoms of Listed Potential Problems Will be Absent, Minimized or Managed (Alcohol Withdrawal Acute, Risk/Actual)  Outcome: Ongoing (interventions implemented as appropriate)      Problem: Anemia (Adult)  Goal: Symptom Improvement  Outcome: Ongoing (interventions implemented as appropriate)      Problem: Patient Care Overview  Goal: Plan of Care Review  Outcome: Ongoing (interventions implemented as appropriate)   01/15/19 0445 01/15/19 1113 01/15/19 1607   Coping/Psychosocial   Plan of Care Reviewed With --  patient --    Plan of Care Review   Progress no change --  --    OTHER   Outcome Summary --  --  VSS, pt denies pain or discomfort. Went for EGD, negative so CT ordered. [facility] protocol maintained.

## 2019-01-16 VITALS
HEART RATE: 108 BPM | WEIGHT: 167 LBS | RESPIRATION RATE: 18 BRPM | OXYGEN SATURATION: 99 % | HEIGHT: 73 IN | DIASTOLIC BLOOD PRESSURE: 85 MMHG | TEMPERATURE: 97.7 F | BODY MASS INDEX: 22.13 KG/M2 | SYSTOLIC BLOOD PRESSURE: 113 MMHG

## 2019-01-16 LAB
ABO + RH BLD: NORMAL
ABO + RH BLD: NORMAL
ANION GAP SERPL CALCULATED.3IONS-SCNC: 4.7 MMOL/L (ref 3–11)
BH BB BLOOD EXPIRATION DATE: NORMAL
BH BB BLOOD EXPIRATION DATE: NORMAL
BH BB BLOOD TYPE BARCODE: 6200
BH BB BLOOD TYPE BARCODE: 6200
BH BB DISPENSE STATUS: NORMAL
BH BB DISPENSE STATUS: NORMAL
BH BB PRODUCT CODE: NORMAL
BH BB PRODUCT CODE: NORMAL
BH BB UNIT NUMBER: NORMAL
BH BB UNIT NUMBER: NORMAL
BUN BLD-MCNC: 4 MG/DL (ref 9–23)
BUN/CREAT SERPL: 6.2 (ref 7–25)
CALCIUM SPEC-SCNC: 8.3 MG/DL (ref 8.7–10.4)
CHLORIDE SERPL-SCNC: 107 MMOL/L (ref 99–109)
CO2 SERPL-SCNC: 23.3 MMOL/L (ref 20–31)
CREAT BLD-MCNC: 0.65 MG/DL (ref 0.6–1.3)
CROSSMATCH INTERPRETATION: NORMAL
CROSSMATCH INTERPRETATION: NORMAL
GFR SERPL CREATININE-BSD FRML MDRD: 137 ML/MIN/1.73
GLUCOSE BLD-MCNC: 122 MG/DL (ref 70–100)
HCT VFR BLD AUTO: 28.2 % (ref 38.9–50.9)
HCT VFR BLD AUTO: 31.2 % (ref 38.9–50.9)
HGB BLD-MCNC: 8.1 G/DL (ref 13.1–17.5)
HGB BLD-MCNC: 9.1 G/DL (ref 13.1–17.5)
INR PPP: 1.5 (ref 0.85–1.16)
POTASSIUM BLD-SCNC: 3.8 MMOL/L (ref 3.5–5.5)
PROTHROMBIN TIME: 17.4 SECONDS (ref 11.2–14.3)
SODIUM BLD-SCNC: 135 MMOL/L (ref 132–146)
UNIT  ABO: NORMAL
UNIT  ABO: NORMAL
UNIT  RH: NORMAL
UNIT  RH: NORMAL

## 2019-01-16 PROCEDURE — 25010000002 LORAZEPAM PER 2 MG: Performed by: FAMILY MEDICINE

## 2019-01-16 PROCEDURE — 85014 HEMATOCRIT: CPT | Performed by: INTERNAL MEDICINE

## 2019-01-16 PROCEDURE — 85610 PROTHROMBIN TIME: CPT | Performed by: PHYSICIAN ASSISTANT

## 2019-01-16 PROCEDURE — 85018 HEMOGLOBIN: CPT | Performed by: INTERNAL MEDICINE

## 2019-01-16 PROCEDURE — 80048 BASIC METABOLIC PNL TOTAL CA: CPT | Performed by: INTERNAL MEDICINE

## 2019-01-16 PROCEDURE — 99239 HOSP IP/OBS DSCHRG MGMT >30: CPT | Performed by: INTERNAL MEDICINE

## 2019-01-16 RX ORDER — ASCORBIC ACID 500 MG
1000 TABLET ORAL DAILY
Qty: 60 TABLET | Refills: 0 | Status: SHIPPED | OUTPATIENT
Start: 2019-01-16 | End: 2019-02-01 | Stop reason: SDUPTHER

## 2019-01-16 RX ORDER — PANTOPRAZOLE SODIUM 40 MG/1
40 TABLET, DELAYED RELEASE ORAL 2 TIMES DAILY
Qty: 60 TABLET | Refills: 0 | Status: SHIPPED | OUTPATIENT
Start: 2019-01-16 | End: 2019-02-01 | Stop reason: SDUPTHER

## 2019-01-16 RX ORDER — FERROUS SULFATE 325(65) MG
325 TABLET ORAL 2 TIMES DAILY
Qty: 60 TABLET | Refills: 0 | Status: SHIPPED | OUTPATIENT
Start: 2019-01-16 | End: 2019-02-01 | Stop reason: SDUPTHER

## 2019-01-16 RX ADMIN — LORAZEPAM 1 MG: 2 INJECTION INTRAMUSCULAR; INTRAVENOUS at 02:10

## 2019-01-16 RX ADMIN — PANTOPRAZOLE SODIUM 40 MG: 40 INJECTION, POWDER, FOR SOLUTION INTRAVENOUS at 08:47

## 2019-01-16 RX ADMIN — LORAZEPAM 1 MG: 1 TABLET ORAL at 19:25

## 2019-01-16 RX ADMIN — MUPIROCIN: 20 OINTMENT TOPICAL at 08:48

## 2019-01-16 RX ADMIN — LORAZEPAM 1 MG: 1 TABLET ORAL at 16:25

## 2019-01-16 RX ADMIN — Medication 100 MG: at 08:47

## 2019-01-16 RX ADMIN — LORAZEPAM 1 MG: 1 TABLET ORAL at 08:47

## 2019-01-16 RX ADMIN — Medication 1 TABLET: at 08:47

## 2019-01-16 RX ADMIN — SODIUM CHLORIDE, PRESERVATIVE FREE 3 ML: 5 INJECTION INTRAVENOUS at 08:48

## 2019-01-16 RX ADMIN — LORAZEPAM 1 MG: 1 TABLET ORAL at 13:28

## 2019-01-16 RX ADMIN — LORAZEPAM 1 MG: 2 INJECTION INTRAMUSCULAR; INTRAVENOUS at 04:07

## 2019-01-16 RX ADMIN — FOLIC ACID 1 MG: 1 TABLET ORAL at 08:47

## 2019-01-16 NOTE — DISCHARGE SUMMARY
[facility] Medicine Services  DISCHARGE SUMMARY    Patient Name: Tavo Murrieta  : 1980  MRN: [id number]    Date of Admission: 2019  Date of Discharge: 2019  Primary Care Physician: Duc Pena MD    Consults     Date and Time Order Name Status Description    1/15/2019 0030 Inpatient Gastroenterology Consult Completed           Hospital Course     Presenting Problem:   Symptomatic anemia [D64.9]    Active Hospital Problems    Diagnosis Date Noted   • **Symptomatic anemia [D64.9] 2019   • Alcoholism /alcohol abuse (CMS/HCC) [F10.20] 2019   • Hyponatremia [E87.1] 2019   • history of Seizure due to alcohol withdrawal (CMS/HCC) [F10.239, R56.9] 2019   • Viral upper respiratory tract infection [J06.9] 2019      Resolved Hospital Problems   No resolved problems to display.          Hospital Course:  Tavo Murrieta is a 38 y.o. male presenting from home with symptomatic iron deficiency anemia.    Symptomatic TABITHA: 37 y/o male presenting with above. His hemoglobin was 4 upon arrival without signs of active bleeding. He was transfused 4 units PRBC's to which he responded appropriately. He underwent EGD by GI without significant source of bleeding noted. Did have esophagitis (probably due to ETOH) and will continue PPI for this. He then underwent CT enterography of the abdomen which was also negative for any sign of bleed. His hemoglobin remained stable. H will continue daily PO iron therapy at d/c. He will follow up with his PCP in 1 week w/ cbc at that time. Will follow up with [facility] in 1 month.    Discharge Follow Up Recommendations for labs/diagnostics:   PCP in 1 week, Recommend CBC   [facility] in 2-4 weeks.    Day of Discharge     HPI: Sitting up in bed. Denies signs of bleeding. Feels better. Wants to go home.    Review of Systems  Gen- No fevers, chills  CV- No chest pain, palpitations  Resp- No cough, dyspnea  GI- No N/V/D, abd  pain    Otherwise ROS is negative except as mentioned in the HPI.    Vital Signs:   Temp:  [97.6 °F (36.4 °C)-98.7 °F (37.1 °C)] 97.7 °F (36.5 °C)  Heart Rate:  [] 108  Resp:  [16-20] 18  BP: (107-116)/(67-85) 113/85     Physical Exam:  Constitutional: No acute distress, awake, alert  HENT: NCAT, mucous membranes moist  Respiratory: Clear to auscultation bilaterally, respiratory effort normal   Cardiovascular: RRR, no murmurs, rubs, or gallops, palpable pedal pulses bilaterally  Gastrointestinal: Positive bowel sounds, soft, nontender, nondistended  Musculoskeletal: No bilateral ankle edema  Psychiatric: Appropriate affect, cooperative  Neurologic: Oriented x 3, strength symmetric in all extremities, Cranial Nerves grossly intact to confrontation, speech clear  Skin: No rashes    Pertinent  and/or Most Recent Results     Results from last 7 days   Lab Units 01/16/19  0440 01/15/19  2036 01/15/19  1336 01/15/19  1014 01/14/19  2318 01/14/19  1314   WBC 10*3/mm3  --   --   --  6.24  --  5.88   HEMOGLOBIN g/dL 8.1* 8.9* 8.3* 8.1* 6.7* 4.0*   HEMATOCRIT % 28.2* 30.2* 28.6* 27.5* 23.9* 16.7*   PLATELETS 10*3/mm3  --   --   --  209  --  259   SODIUM mmol/L 135  --   --  135  --  130*   POTASSIUM mmol/L 3.8  --   --  4.1  --  3.5   CHLORIDE mmol/L 107  --   --  107  --  100   CO2 mmol/L 23.3  --   --  23.0  --  23.0   BUN mg/dL 4*  --   --  6*  --  6*   CREATININE mg/dL 0.65  --   --  0.54*  --  0.65   GLUCOSE mg/dL 122*  --   --  101*  --  124*   CALCIUM mg/dL 8.3*  --   --  8.0*  --  8.6*     Results from last 7 days   Lab Units 01/16/19  0440 01/15/19  1014 01/14/19  1314   BILIRUBIN mg/dL  --  1.3* 0.8   ALK PHOS U/L  --  81 92   ALT (SGPT) U/L  --  15 19   AST (SGOT) U/L  --  16 20   PROTIME Seconds 17.4*  --  15.8*   INR  1.50*  --  1.32*   APTT seconds  --   --  29.6           Invalid input(s): TG, LDLCALC, LDLREALC        Brief Urine Lab Results  (Last result in the past 365 days)      Color   Clarity   Blood    Leuk Est   Nitrite   Protein   CREAT   Urine HCG        01/14/19 1627 Yellow Clear Negative Negative Negative Negative               Microbiology Results Abnormal     None          Imaging Results (all)     Procedure Component Value Units Date/Time    CT Abdomen Pelvis With Contrast Enterography [[id number]] Collected:  01/16/19 0839     Updated:  01/16/19 1041    Narrative:       EXAMINATION: CT ABDOMEN AND PELVIS WITH CONTRAST,  ENTEROGRAPHY-01/15/2019:      INDICATION: UGI bleed, nonvariceal, no clear source on endoscopy;  D64.9-Anemia, unspecified.      TECHNIQUE: CT abdomen and pelvis with intravenous contrast  administration following enterography protocol.     The radiation dose reduction device was turned on for each scan per the  ALARA (As Low as Reasonably Achievable) protocol.     COMPARISON: NONE.     FINDINGS: The lung bases are grossly clear. Liver without focal lesion.  Gallbladder contracted with calculus in the dependent portion indicating  cholelithiasis. No intrahepatic or extrahepatic biliary dilatation.  Pancreas and spleen grossly unremarkable. Adrenals without distinct  nodule. Kidneys without hydronephrosis or hydroureter demonstrating  grossly symmetric nephrogram and excretory phase imaging on delayed  sequences without obstructive uropathy or urolithiasis. No bulky  retroperitoneal adenopathy. Patent nonaneurysmal abdominal aorta. Portal  veins and IVC patent.     GI tract evaluation utilizing enterography protocol has appropriate  distention of intraluminal filling throughout the small bowel in  particular without evidence for mechanical obstructive findings. No  focal area of thickening or abnormal mural hyperenhancement. No  loculated fluid collection or free fluid. No evidence for penetrating  disease. Terminal ileum within normal limits and without abnormal  stenosis or stricturing. No fibrofatty change noted within the right  lower quadrant or hyperemia vasculature. The pelvic  viscera are grossly  unremarkable without bulky pelvic adenopathy or significant free fluid.  Degenerative changes of the spine without aggressive osseous or soft  tissue body wall lesions.       Impression:       No acute intra-abdominal or intrapelvic abnormality with  appropriate distention on enterography protocol of the intraluminal  evaluation without mucosal hyperenhancement, penetrating disease or  acute inflammatory process identified within the GI tract. No free fluid  or intra-abdominal free air within the abdomen.     D:  01/16/2019  E:  01/16/2019           This report was finalized on 1/16/2019 10:39 AM by Dr. Raymundo Dickens.       XR Chest 2 View [[id number]] Collected:  01/14/19 1450     Updated:  01/14/19 1618    Narrative:       EXAMINATION: XR CHEST 2 VW-01/14/2019:      INDICATION: Weak.      COMPARISON: NONE.     FINDINGS: The heart size is normal. There is no pulmonary inflammatory  process. There is no mass or effusion.           Impression:       No active disease.     D:  01/14/2019  E:  01/14/2019     This report was finalized on 1/14/2019 4:16 PM by Dr. Roc Ruiz MD.                       Results for orders placed during the hospital encounter of 10/07/18   Adult Transthoracic Echo Complete W/ Cont if Necessary Per Protocol    Narrative · Left ventricular systolic function is normal. Estimated EF appears to be   in the range of 61 - 65%. Normal left ventricular cavity size and wall   thickness noted. Left ventricular diastolic function is normal.     No significant valvular heart disease noted.  Essentially normal cardiac structure and function.          Order Current Status    Hemoglobin & Hematocrit, Blood Collected (01/16/19 1117)    Tissue Pathology Exam In process        Discharge Details        Discharge Medications      New Medications      Instructions Start Date   ascorbic acid 1000 MG tablet  Commonly known as:  VITAMIN C   1,000 mg, Oral, Daily      ferrous sulfate 325 (65 FE)  MG tablet  Commonly known as:  FERROUSUL   325 mg, Oral, 2 Times Daily      pantoprazole 40 MG EC tablet  Commonly known as:  PROTONIX   40 mg, Oral, 2 Times Daily             No Known Allergies      Discharge Disposition:  Home or Self Care    Discharge Diet:  Diet Order   Procedures   • Diet Regular; GI Soft/[place]         Discharge Activity: As tolerated      CODE STATUS:    Code Status and Medical Interventions:   Ordered at: 01/14/19 3885     Code Status:    CPR     Medical Interventions (Level of Support Prior to Arrest):    Full         No future appointments.    Additional Instructions for the Follow-ups that You Need to Schedule     Discharge Follow-up with PCP   As directed       Currently Documented PCP:    Duc Pena MD    PCP Phone Number:    [phone]     Follow Up Details:  1 week w/ CBC         Discharge Follow-up with Specialty: GI - [facility]; 1 Month   As directed      Specialty:  GI - [facility]    Follow Up:  1 Month               Time Spent on Discharge: 32 minutes    Electronically signed by Brooke Stone II, DO, 01/16/19, 11:23 AM.

## 2019-01-16 NOTE — PROGRESS NOTES
Case Management Discharge Note    Final Note: Pt. will be discharged home today.  Pt. has transportation arranged by personal vehicle.  SW has provided pt. with substance abuse resources.  No further needs were reported prior to discharge.      Destination      No service has been selected for the patient.      Durable Medical Equipment      No service has been selected for the patient.      Dialysis/Infusion      No service has been selected for the patient.      Home Medical Care      No service has been selected for the patient.      Community Resources      No service has been selected for the patient.             Final Discharge Disposition Code: 01 - home or self-care

## 2019-01-16 NOTE — PLAN OF CARE
Problem: Patient Care Overview  Goal: Plan of Care Review  Outcome: Ongoing (interventions implemented as appropriate)   01/16/19 0122   Coping/Psychosocial   Plan of Care Reviewed With patient   Plan of Care Review   Progress improving   OTHER   Outcome Summary VSS, H/H improving. No complaints noted through the night, mild withdrawal symptoms noted- medicated as ordered with effectiveness.

## 2019-01-17 LAB
CYTO UR: NORMAL
LAB AP CASE REPORT: NORMAL
LAB AP CLINICAL INFORMATION: NORMAL
PATH REPORT.FINAL DX SPEC: NORMAL
PATH REPORT.GROSS SPEC: NORMAL

## 2019-01-17 NOTE — PAYOR COMM NOTE
"Tavo Portillo (38 y.o. Male)     Date of Birth Social Security Number Address Home Phone MRN    1980  [address] [place] [address] [phone] [id number]    [employer] Marital Status          [facility] [place] [facility]       Admission Date Admission Type Admitting Provider Attending Provider Department, Room/Bed    19 Emergency Brooke Stone II, DO  [facility] 5G, S549/1    Discharge Date Discharge Disposition Discharge Destination        2019 Home or Self Care Home             Attending Provider:  (none)   Allergies:  No Known Allergies    Isolation:  None   Infection:  None   Code Status:  Prior    Ht:  185.4 cm (73\")   Wt:  75.8 kg (167 lb)    Admission Cmt:  None   Principal Problem:  Symptomatic anemia [D64.9]                 Active Insurance as of 2019     Primary Coverage     Payor Plan Insurance Group Employer/Plan Group    [employer] [place] [employer] [place]      Payor Plan Address Payor Plan Phone Number Payor Plan Fax Number Effective Dates    [address] [address] [phone]  10/7/2018 - None Entered    [facility] [id number]       Subscriber Name Subscriber Birth Date Member ID       TAVO PORTILLO 1980 [id number]                 Emergency Contacts      (Rel.) Home Phone Work Phone Mobile Phone    JoseBeth (Sister) [phone] -- --    Crystal Portillo (Mother) -- -- [phone]    Lianne Hartley (Significant Other) -- -- [phone]               Discharge Summary      Brooke Stone II, DO at 2019  7:50 AM              [facility] Medicine Services  DISCHARGE SUMMARY    Patient Name: Tavo Portillo  : 1980  MRN: [id number]    Date of Admission: 2019  Date of Discharge: 2019  Primary Care Physician: Duc Pena MD    Consults     Date and Time Order Name Status Description    1/15/2019 0030 Inpatient Gastroenterology Consult Completed           Hospital Course     Presenting Problem:   Symptomatic " anemia [D64.9]    Active Hospital Problems    Diagnosis Date Noted   • **Symptomatic anemia [D64.9] 01/14/2019   • Alcoholism /alcohol abuse (CMS/HCC) [F10.20] 01/14/2019   • Hyponatremia [E87.1] 01/14/2019   • history of Seizure due to alcohol withdrawal (CMS/HCC) [F10.239, R56.9] 01/14/2019   • Viral upper respiratory tract infection [J06.9] 01/14/2019      Resolved Hospital Problems   No resolved problems to display.          Hospital Course:  Tavo Murrieta is a 38 y.o. male presenting from home with symptomatic iron deficiency anemia.    Symptomatic TABITHA: 39 y/o male presenting with above. His hemoglobin was 4 upon arrival without signs of active bleeding. He was transfused 4 units PRBC's to which he responded appropriately. He underwent EGD by GI without significant source of bleeding noted. Did have esophagitis (probably due to ETOH) and will continue PPI for this. He then underwent CT enterography of the abdomen which was also negative for any sign of bleed. His hemoglobin remained stable. H will continue daily PO iron therapy at d/c. He will follow up with his PCP in 1 week w/ cbc at that time. Will follow up with [facility] in 1 month.    Discharge Follow Up Recommendations for labs/diagnostics:   PCP in 1 week, Recommend CBC   [facility] in 2-4 weeks.    Day of Discharge     HPI: Sitting up in bed. Denies signs of bleeding. Feels better. Wants to go home.    Review of Systems  Gen- No fevers, chills  CV- No chest pain, palpitations  Resp- No cough, dyspnea  GI- No N/V/D, abd pain    Otherwise ROS is negative except as mentioned in the HPI.    Vital Signs:   Temp:  [97.6 °F (36.4 °C)-98.7 °F (37.1 °C)] 97.7 °F (36.5 °C)  Heart Rate:  [] 108  Resp:  [16-20] 18  BP: (107-116)/(67-85) 113/85     Physical Exam:  Constitutional: No acute distress, awake, alert  HENT: NCAT, mucous membranes moist  Respiratory: Clear to auscultation bilaterally, respiratory effort normal   Cardiovascular: RRR, no murmurs, rubs, or  gallops, palpable pedal pulses bilaterally  Gastrointestinal: Positive bowel sounds, soft, nontender, nondistended  Musculoskeletal: No bilateral ankle edema  Psychiatric: Appropriate affect, cooperative  Neurologic: Oriented x 3, strength symmetric in all extremities, Cranial Nerves grossly intact to confrontation, speech clear  Skin: No rashes    Pertinent  and/or Most Recent Results     Results from last 7 days   Lab Units 01/16/19  0440 01/15/19  2036 01/15/19  1336 01/15/19  1014 01/14/19  2318 01/14/19  1314   WBC 10*3/mm3  --   --   --  6.24  --  5.88   HEMOGLOBIN g/dL 8.1* 8.9* 8.3* 8.1* 6.7* 4.0*   HEMATOCRIT % 28.2* 30.2* 28.6* 27.5* 23.9* 16.7*   PLATELETS 10*3/mm3  --   --   --  209  --  259   SODIUM mmol/L 135  --   --  135  --  130*   POTASSIUM mmol/L 3.8  --   --  4.1  --  3.5   CHLORIDE mmol/L 107  --   --  107  --  100   CO2 mmol/L 23.3  --   --  23.0  --  23.0   BUN mg/dL 4*  --   --  6*  --  6*   CREATININE mg/dL 0.65  --   --  0.54*  --  0.65   GLUCOSE mg/dL 122*  --   --  101*  --  124*   CALCIUM mg/dL 8.3*  --   --  8.0*  --  8.6*     Results from last 7 days   Lab Units 01/16/19  0440 01/15/19  1014 01/14/19  1314   BILIRUBIN mg/dL  --  1.3* 0.8   ALK PHOS U/L  --  81 92   ALT (SGPT) U/L  --  15 19   AST (SGOT) U/L  --  16 20   PROTIME Seconds 17.4*  --  15.8*   INR  1.50*  --  1.32*   APTT seconds  --   --  29.6           Invalid input(s): TG, LDLCALC, LDLREALC        Brief Urine Lab Results  (Last result in the past 365 days)      Color   Clarity   Blood   Leuk Est   Nitrite   Protein   CREAT   Urine HCG        01/14/19 1627 Yellow Clear Negative Negative Negative Negative               Microbiology Results Abnormal     None          Imaging Results (all)     Procedure Component Value Units Date/Time    CT Abdomen Pelvis With Contrast Enterography [[id number]] Collected:  01/16/19 0839     Updated:  01/16/19 1041    Narrative:       EXAMINATION: CT ABDOMEN AND PELVIS WITH  CONTRAST,  ENTEROGRAPHY-01/15/2019:      INDICATION: UGI bleed, nonvariceal, no clear source on endoscopy;  D64.9-Anemia, unspecified.      TECHNIQUE: CT abdomen and pelvis with intravenous contrast  administration following enterography protocol.     The radiation dose reduction device was turned on for each scan per the  ALARA (As Low as Reasonably Achievable) protocol.     COMPARISON: NONE.     FINDINGS: The lung bases are grossly clear. Liver without focal lesion.  Gallbladder contracted with calculus in the dependent portion indicating  cholelithiasis. No intrahepatic or extrahepatic biliary dilatation.  Pancreas and spleen grossly unremarkable. Adrenals without distinct  nodule. Kidneys without hydronephrosis or hydroureter demonstrating  grossly symmetric nephrogram and excretory phase imaging on delayed  sequences without obstructive uropathy or urolithiasis. No bulky  retroperitoneal adenopathy. Patent nonaneurysmal abdominal aorta. Portal  veins and IVC patent.     GI tract evaluation utilizing enterography protocol has appropriate  distention of intraluminal filling throughout the small bowel in  particular without evidence for mechanical obstructive findings. No  focal area of thickening or abnormal mural hyperenhancement. No  loculated fluid collection or free fluid. No evidence for penetrating  disease. Terminal ileum within normal limits and without abnormal  stenosis or stricturing. No fibrofatty change noted within the right  lower quadrant or hyperemia vasculature. The pelvic viscera are grossly  unremarkable without bulky pelvic adenopathy or significant free fluid.  Degenerative changes of the spine without aggressive osseous or soft  tissue body wall lesions.       Impression:       No acute intra-abdominal or intrapelvic abnormality with  appropriate distention on enterography protocol of the intraluminal  evaluation without mucosal hyperenhancement, penetrating disease or  acute inflammatory  process identified within the GI tract. No free fluid  or intra-abdominal free air within the abdomen.     D:  01/16/2019  E:  01/16/2019           This report was finalized on 1/16/2019 10:39 AM by Dr. Raymundo Dickens.       XR Chest 2 View [[id number]] Collected:  01/14/19 1450     Updated:  01/14/19 1618    Narrative:       EXAMINATION: XR CHEST 2 VW-01/14/2019:      INDICATION: Weak.      COMPARISON: NONE.     FINDINGS: The heart size is normal. There is no pulmonary inflammatory  process. There is no mass or effusion.           Impression:       No active disease.     D:  01/14/2019  E:  01/14/2019     This report was finalized on 1/14/2019 4:16 PM by Dr. Roc Ruiz MD.                       Results for orders placed during the hospital encounter of 10/07/18   Adult Transthoracic Echo Complete W/ Cont if Necessary Per Protocol    Narrative · Left ventricular systolic function is normal. Estimated EF appears to be   in the range of 61 - 65%. Normal left ventricular cavity size and wall   thickness noted. Left ventricular diastolic function is normal.     No significant valvular heart disease noted.  Essentially normal cardiac structure and function.          Order Current Status    Hemoglobin & Hematocrit, Blood Collected (01/16/19 1117)    Tissue Pathology Exam In process        Discharge Details        Discharge Medications      New Medications      Instructions Start Date   ascorbic acid 1000 MG tablet  Commonly known as:  VITAMIN C   1,000 mg, Oral, Daily      ferrous sulfate 325 (65 FE) MG tablet  Commonly known as:  FERROUSUL   325 mg, Oral, 2 Times Daily      pantoprazole 40 MG EC tablet  Commonly known as:  PROTONIX   40 mg, Oral, 2 Times Daily             No Known Allergies      Discharge Disposition:  Home or Self Care    Discharge Diet:  Diet Order   Procedures   • Diet Regular; GI Soft/[place]         Discharge Activity: As tolerated      CODE STATUS:    Code Status and Medical Interventions:   Ordered  at: 01/14/19 1646     Code Status:    CPR     Medical Interventions (Level of Support Prior to Arrest):    Full         No future appointments.    Additional Instructions for the Follow-ups that You Need to Schedule     Discharge Follow-up with PCP   As directed       Currently Documented PCP:    Duc Pena MD    PCP Phone Number:    [phone]     Follow Up Details:  1 week w/ CBC         Discharge Follow-up with Specialty: GI - [facility]; 1 Month   As directed      Specialty:  GI - [facility]    Follow Up:  1 Month               Time Spent on Discharge: 32 minutes    Electronically signed by Brooke Stone II, DO, 01/16/19, 11:23 AM.      Electronically signed by Brooke Stone II, DO at 1/16/2019 11:27 AM

## 2019-02-01 ENCOUNTER — TELEPHONE (OUTPATIENT)
Dept: GASTROENTEROLOGY | Facility: CLINIC | Age: 39
End: 2019-02-01

## 2019-02-01 ENCOUNTER — OFFICE VISIT (OUTPATIENT)
Dept: GASTROENTEROLOGY | Facility: CLINIC | Age: 39
End: 2019-02-01

## 2019-02-01 VITALS
HEIGHT: 73 IN | SYSTOLIC BLOOD PRESSURE: 130 MMHG | DIASTOLIC BLOOD PRESSURE: 90 MMHG | HEART RATE: 112 BPM | WEIGHT: 171.8 LBS | BODY MASS INDEX: 22.77 KG/M2

## 2019-02-01 DIAGNOSIS — Z87.19 HISTORY OF GI BLEED: ICD-10-CM

## 2019-02-01 DIAGNOSIS — K21.9 GASTROESOPHAGEAL REFLUX DISEASE, ESOPHAGITIS PRESENCE NOT SPECIFIED: Primary | ICD-10-CM

## 2019-02-01 DIAGNOSIS — D50.9 IRON DEFICIENCY ANEMIA, UNSPECIFIED IRON DEFICIENCY ANEMIA TYPE: ICD-10-CM

## 2019-02-01 LAB
CRP SERPL-MCNC: 2.96 MG/DL (ref 0–0.99)
DEPRECATED RDW RBC AUTO: 71.2 FL (ref 37–54)
ERYTHROCYTE [DISTWIDTH] IN BLOOD BY AUTOMATED COUNT: 28.9 % (ref 11.5–14.5)
FERRITIN SERPL-MCNC: 17 NG/ML (ref 21.9–321.7)
HCT VFR BLD AUTO: 38.1 % (ref 42–52)
HGB BLD-MCNC: 11 G/DL (ref 14–18)
IRON 24H UR-MRATE: 21 MCG/DL (ref 53–167)
IRON SATN MFR SERPL: 4 % (ref 20–50)
MCH RBC QN AUTO: 20.8 PG (ref 27–33)
MCHC RBC AUTO-ENTMCNC: 28.9 G/DL (ref 33–37)
MCV RBC AUTO: 71.9 FL (ref 80–94)
PLATELET # BLD AUTO: 454 10*3/MM3 (ref 130–400)
PMV BLD AUTO: 9.8 FL (ref 6–10)
RBC # BLD AUTO: 5.3 10*6/MM3 (ref 4.7–6.1)
TIBC SERPL-MCNC: 507 MCG/DL (ref 241–421)
WBC NRBC COR # BLD: 10.13 10*3/MM3 (ref 4.5–12.5)

## 2019-02-01 PROCEDURE — 99214 OFFICE O/P EST MOD 30 MIN: CPT | Performed by: PHYSICIAN ASSISTANT

## 2019-02-01 PROCEDURE — 83540 ASSAY OF IRON: CPT | Performed by: PHYSICIAN ASSISTANT

## 2019-02-01 PROCEDURE — 83550 IRON BINDING TEST: CPT | Performed by: PHYSICIAN ASSISTANT

## 2019-02-01 PROCEDURE — 85027 COMPLETE CBC AUTOMATED: CPT | Performed by: PHYSICIAN ASSISTANT

## 2019-02-01 PROCEDURE — 82728 ASSAY OF FERRITIN: CPT | Performed by: PHYSICIAN ASSISTANT

## 2019-02-01 PROCEDURE — 86140 C-REACTIVE PROTEIN: CPT | Performed by: PHYSICIAN ASSISTANT

## 2019-02-01 RX ORDER — ASCORBIC ACID 500 MG
1000 TABLET ORAL 2 TIMES DAILY
Qty: 60 TABLET | Refills: 5 | Status: SHIPPED | OUTPATIENT
Start: 2019-02-01

## 2019-02-01 RX ORDER — BUSPIRONE HYDROCHLORIDE 7.5 MG/1
7.5 TABLET ORAL 2 TIMES DAILY
COMMUNITY

## 2019-02-01 RX ORDER — POLYETHYLENE GLYCOL 3350 17 G/17G
17 POWDER, FOR SOLUTION ORAL DAILY
Qty: 510 G | Refills: 2 | Status: SHIPPED | OUTPATIENT
Start: 2019-02-01

## 2019-02-01 RX ORDER — PANTOPRAZOLE SODIUM 40 MG/1
40 TABLET, DELAYED RELEASE ORAL 2 TIMES DAILY
Qty: 60 TABLET | Refills: 5 | Status: SHIPPED | OUTPATIENT
Start: 2019-02-01

## 2019-02-01 RX ORDER — NALTREXONE HYDROCHLORIDE 50 MG/1
50 TABLET, FILM COATED ORAL DAILY
COMMUNITY

## 2019-02-01 RX ORDER — FERROUS SULFATE 325(65) MG
325 TABLET ORAL 2 TIMES DAILY
Qty: 60 TABLET | Refills: 5 | Status: SHIPPED | OUTPATIENT
Start: 2019-02-01

## 2019-02-01 NOTE — TELEPHONE ENCOUNTER
Please let patient know that Hgb is now at 11.0 (from 9.1 previously) but iron has barely improved. Iron was 17 in Oct 2018, now 21. It may help to bring the iron up more by taking Vit C with iron as we discussed. We will re-check labs at next visit but this is some improvement.

## 2019-02-01 NOTE — PROGRESS NOTES
: 1980    Chief Complaint   Patient presents with   • Anemia       Tavo Murrieta is a 38 y.o. male with PMH of GI bleeding who presents to the office today as a new patient for evaluation of anemia.    History of Present Illness:  He has been taking iron supplements since hospital discharge (2019), 325 mg BID. He is unable to take the Vit C at time because it seems to keep him from sleeping well, he usually takes 2 tablets each morning of the Vit C. He has never seen a hematologist. Hgb was 9.1 at discharge but 4 on admission, he was previously having dizziness. He received blood transfusions. He is feeling much better than previous. Stools are now a dark green. He had very black and bloody stools previously. He had hematemesis in 10/2018 and was seen at [facility] with EGD and colonoscopy by Dr. Owen. He is drinking alcohol currently, he drank 4 - 16 oz cans 2 days ago, 2- 16 oz cans yesterday. He would like to stop drinking completely. He continues to take Protonix 40 mg BID for GERD and would like refills.     Last EGD performed at [facility] showed gastritis only, no source of bleeding.    CT abd/pelv 1/15/2019:  No acute intra-abdominal or intrapelvic abnormality with  appropriate distention on enterography protocol of the intraluminal  evaluation without mucosal hyperenhancement, penetrating disease or  acute inflammatory process identified within the GI tract. No free fluid  or intra-abdominal free air within the abdomen.    Review of Systems   Constitutional: Negative for chills, fatigue and fever.   HENT: Negative for trouble swallowing.    Eyes: Negative.    Respiratory: Positive for cough and chest tightness. Negative for choking and shortness of breath.    Cardiovascular: Positive for chest pain. Negative for palpitations and leg swelling.   Gastrointestinal: Positive for nausea and vomiting. Negative for abdominal distention, abdominal pain, anal bleeding, blood in stool, constipation and diarrhea.    Endocrine: Negative.    Genitourinary: Negative for difficulty urinating.   Musculoskeletal: Positive for neck pain. Negative for back pain.   Skin: Negative for color change, pallor, rash and wound.   Allergic/Immunologic: Negative for environmental allergies and food allergies.   Neurological: Negative for dizziness, light-headedness and headaches.   Psychiatric/Behavioral: The patient is nervous/anxious.        Past Medical History:   Diagnosis Date   • Alcohol abuse    • Seizure due to alcohol withdrawal (CMS/HCC)    • Seizures (CMS/HCC)        Past Surgical History:   Procedure Laterality Date   • COLONOSCOPY N/A 10/8/2018    Procedure: COLONOSCOPY;  Surgeon: Parish Owen MD;  Location: [facility] OR;  Service: Gastroenterology   • ENDOSCOPY N/A 10/8/2018    Procedure: ESOPHAGOGASTRODUODENOSCOPY;  Surgeon: Parish Owen MD;  Location: [facility] OR;  Service: Gastroenterology   • ENDOSCOPY N/A 1/15/2019    Procedure: ESOPHAGOGASTRODUODENOSCOPY;  Surgeon: Brunner, Mark I, MD;  Location: [facility] ENDOSCOPY;  Service: Gastroenterology       Family History   Problem Relation Age of Onset   • Hypertension Mother        Social History     Socioeconomic History   • Marital status: Single     Spouse name: Not on file   • Number of children: Not on file   • Years of education: Not on file   • Highest education level: Not on file   Tobacco Use   • Smoking status: Never Smoker   • Smokeless tobacco: Current User     Types: Snuff   Substance and Sexual Activity   • Alcohol use: Yes     Drinks per session: 3 or 4     Comment: states 3-4 beers per day, down from 1 pint vodka daily   • Drug use: No   • Sexual activity: Defer   Social History Narrative    Hospitalization [place] 10/2018    Hospitalization [facility] [place]2018       Current Outpatient Medications:   •  busPIRone (BUSPAR) 7.5 MG tablet, Take 7.5 mg by mouth 2 (Two) Times a Day., Disp: , Rfl:   •  ferrous sulfate (FERROUSUL) 325 (65 FE) MG tablet, Take 1 tablet by mouth 2  "(Two) Times a Day., Disp: 60 tablet, Rfl: 0  •  naltrexone (DEPADE) 50 MG tablet, Take 50 mg by mouth Daily., Disp: , Rfl:   •  pantoprazole (PROTONIX) 40 MG EC tablet, Take 1 tablet by mouth 2 (Two) Times a Day., Disp: 60 tablet, Rfl: 0  •  vitamin C (ASCORBIC ACID) 500 MG tablet, Take 2 tablets by mouth Daily., Disp: 60 tablet, Rfl: 0    Allergies:   Patient has no known allergies.    Vitals:  /90 (BP Location: Left arm, Patient Position: Sitting, Cuff Size: Adult)   Pulse 112   Ht 185.4 cm (73\")   Wt 77.9 kg (171 lb 12.8 oz)   BMI 22.67 kg/m²     Physical Exam   Constitutional: He is oriented to person, place, and time. He appears well-developed and well-nourished. No distress.   HENT:   Head: Normocephalic and atraumatic.   Nose: Nose normal.   Mouth/Throat: Oropharynx is clear and moist.   Eyes: Conjunctivae are normal. Right eye exhibits no discharge. Left eye exhibits no discharge. No scleral icterus.   Neck: Normal range of motion. No JVD present.   Cardiovascular: Normal rate, regular rhythm and normal heart sounds. Exam reveals no gallop and no friction rub.   No murmur heard.  Pulmonary/Chest: Effort normal and breath sounds normal. No respiratory distress. He has no wheezes. He has no rales. He exhibits no tenderness.   Abdominal: Soft. Bowel sounds are normal. He exhibits no mass. There is no tenderness.   Musculoskeletal: Normal range of motion. He exhibits no edema or deformity.   Neurological: He is alert and oriented to person, place, and time. Coordination normal.   Skin: Skin is warm and dry. No rash noted. He is not diaphoretic. No erythema.   Psychiatric: He has a normal mood and affect. His behavior is normal. Judgment and thought content normal.   Vitals reviewed.      Assessment/Plan:  1. Gastroesophageal reflux disease, esophagitis presence not specified    2. History of GI bleed    3. Iron deficiency anemia, unspecified iron deficiency anemia type      Orders Placed This Encounter "   Procedures   • CBC (No Diff)   • Iron Profile   • Ferritin   • C-reactive Protein     New Medications Ordered This Visit   Medications   • pantoprazole (PROTONIX) 40 MG EC tablet     Sig: Take 1 tablet by mouth 2 (Two) Times a Day.     Dispense:  60 tablet     Refill:  5   • ferrous sulfate (FERROUSUL) 325 (65 FE) MG tablet     Sig: Take 1 tablet by mouth 2 (Two) Times a Day.     Dispense:  60 tablet     Refill:  5   • vitamin C (ASCORBIC ACID) 500 MG tablet     Sig: Take 2 tablets by mouth 2 (Two) Times a Day. Take with the iron supplements.     Dispense:  60 tablet     Refill:  5   • polyethylene glycol (MIRALAX) powder     Sig: Take 17 g by mouth Daily.     Dispense:  510 g     Refill:  2     Continue Protonix 40 mg BID for treatment of GERD and also for GI bleeding prophylaxis. I have directed him to stop drinking alcohol. He will have labs today to monitor for improvement in anemia and iron. He will start taking iron supplements and Vit C supplements together to enhance iron absorption. For constipation, he can take Miralax 17 g once daily only as needed.         Return in about 1 month (around 3/1/2019) for recheck anemia and iron.      Electronically signed 2/6/2019 4:15 PM  Geni Lopez PA-C, [facility]

## (undated) DEVICE — GOWN,REINF,POLY,ECL,PP SLV,XL: Brand: MEDLINE

## (undated) DEVICE — Device: Brand: DEFENDO AIR/WATER/SUCTION AND BIOPSY VALVE

## (undated) DEVICE — SUCTION CANISTER, 1500CC, RIGID: Brand: DEROYAL

## (undated) DEVICE — SINGLE-USE BIOPSY FORCEPS: Brand: RADIAL JAW 4

## (undated) DEVICE — SYR LUERLOK 30CC

## (undated) DEVICE — TUBING, SUCTION, 3/16" X 10', STRAIGHT: Brand: MEDLINE

## (undated) DEVICE — TUBING, SUCTION, 1/4" X 20', STRAIGHT: Brand: MEDLINE INDUSTRIES, INC.

## (undated) DEVICE — CONN Y IRR DISP 1P/U

## (undated) DEVICE — Device

## (undated) DEVICE — THE BITE BLOCK MAXI, LATEX FREE STRAP IS USED TO PROTECT THE ENDOSCOPE INSERTION TUBE FROM BEING BITTEN BY THE PATIENT.

## (undated) DEVICE — Device: Brand: ENDOGATOR

## (undated) DEVICE — SINGLE PORT MANIFOLD: Brand: NEPTUNE 2

## (undated) DEVICE — FRCP BX RADJAW4 NDL 2.8 240CM LG OG BX40